# Patient Record
Sex: FEMALE | Race: ASIAN | Employment: STUDENT | URBAN - METROPOLITAN AREA
[De-identification: names, ages, dates, MRNs, and addresses within clinical notes are randomized per-mention and may not be internally consistent; named-entity substitution may affect disease eponyms.]

---

## 2017-01-18 ENCOUNTER — TELEPHONE (OUTPATIENT)
Dept: OBGYN | Facility: CLINIC | Age: 22
End: 2017-01-18

## 2017-01-18 NOTE — TELEPHONE ENCOUNTER
TC to check in about surgery for Friday. Number listed at M had no VM. Number listed as H: LM.     If calls back today please text my cell phone [phone]  I let them know unavail tomorrow (thursday the 19) but we are all set for Friday the 20.    Rosy Ryan

## 2017-01-20 ENCOUNTER — ANESTHESIA EVENT (OUTPATIENT)
Dept: SURGERY | Facility: CLINIC | Age: 22
End: 2017-01-20
Payer: COMMERCIAL

## 2017-01-20 ENCOUNTER — ANESTHESIA (OUTPATIENT)
Dept: SURGERY | Facility: CLINIC | Age: 22
End: 2017-01-20
Payer: COMMERCIAL

## 2017-01-20 ENCOUNTER — OFFICE VISIT (OUTPATIENT)
Dept: INTERPRETER SERVICES | Facility: CLINIC | Age: 22
End: 2017-01-20

## 2017-01-20 ENCOUNTER — SURGERY (OUTPATIENT)
Age: 22
End: 2017-01-20

## 2017-01-20 PROBLEM — Z90.710 S/P HYSTERECTOMY: Status: ACTIVE | Noted: 2017-01-20

## 2017-01-20 PROCEDURE — 25000125 ZZHC RX 250: Performed by: ANESTHESIOLOGY

## 2017-01-20 PROCEDURE — 25000128 H RX IP 250 OP 636: Performed by: ANESTHESIOLOGY

## 2017-01-20 PROCEDURE — 25800025 ZZH RX 258: Performed by: NURSE ANESTHETIST, CERTIFIED REGISTERED

## 2017-01-20 PROCEDURE — 25000128 H RX IP 250 OP 636: Performed by: NURSE ANESTHETIST, CERTIFIED REGISTERED

## 2017-01-20 PROCEDURE — 25000125 ZZHC RX 250: Performed by: OBSTETRICS & GYNECOLOGY

## 2017-01-20 PROCEDURE — C9290 INJ, BUPIVACAINE LIPOSOME: HCPCS | Performed by: ANESTHESIOLOGY

## 2017-01-20 PROCEDURE — 76942 ECHO GUIDE FOR BIOPSY: CPT | Performed by: ANESTHESIOLOGY

## 2017-01-20 PROCEDURE — 25000125 ZZHC RX 250: Performed by: NURSE ANESTHETIST, CERTIFIED REGISTERED

## 2017-01-20 RX ORDER — GLYCOPYRROLATE 0.2 MG/ML
INJECTION, SOLUTION INTRAMUSCULAR; INTRAVENOUS PRN
Status: DISCONTINUED | OUTPATIENT
Start: 2017-01-20 | End: 2017-01-20

## 2017-01-20 RX ORDER — SODIUM CHLORIDE, SODIUM LACTATE, POTASSIUM CHLORIDE, CALCIUM CHLORIDE 600; 310; 30; 20 MG/100ML; MG/100ML; MG/100ML; MG/100ML
INJECTION, SOLUTION INTRAVENOUS CONTINUOUS PRN
Status: DISCONTINUED | OUTPATIENT
Start: 2017-01-20 | End: 2017-01-20

## 2017-01-20 RX ORDER — ONDANSETRON 2 MG/ML
INJECTION INTRAMUSCULAR; INTRAVENOUS PRN
Status: DISCONTINUED | OUTPATIENT
Start: 2017-01-20 | End: 2017-01-20

## 2017-01-20 RX ORDER — DEXAMETHASONE SODIUM PHOSPHATE 4 MG/ML
INJECTION, SOLUTION INTRA-ARTICULAR; INTRALESIONAL; INTRAMUSCULAR; INTRAVENOUS; SOFT TISSUE PRN
Status: DISCONTINUED | OUTPATIENT
Start: 2017-01-20 | End: 2017-01-20

## 2017-01-20 RX ORDER — PROPOFOL 10 MG/ML
INJECTION, EMULSION INTRAVENOUS CONTINUOUS PRN
Status: DISCONTINUED | OUTPATIENT
Start: 2017-01-20 | End: 2017-01-20

## 2017-01-20 RX ORDER — NEOSTIGMINE METHYLSULFATE 1 MG/ML
VIAL (ML) INJECTION PRN
Status: DISCONTINUED | OUTPATIENT
Start: 2017-01-20 | End: 2017-01-20

## 2017-01-20 RX ORDER — BUPIVACAINE HYDROCHLORIDE AND EPINEPHRINE 2.5; 5 MG/ML; UG/ML
INJECTION, SOLUTION INFILTRATION; PERINEURAL PRN
Status: DISCONTINUED | OUTPATIENT
Start: 2017-01-20 | End: 2017-01-20

## 2017-01-20 RX ORDER — FENTANYL CITRATE 50 UG/ML
INJECTION, SOLUTION INTRAMUSCULAR; INTRAVENOUS PRN
Status: DISCONTINUED | OUTPATIENT
Start: 2017-01-20 | End: 2017-01-20

## 2017-01-20 RX ADMIN — Medication 5 ML: at 12:30

## 2017-01-20 RX ADMIN — ROCURONIUM BROMIDE 40 MG: 10 INJECTION INTRAVENOUS at 09:57

## 2017-01-20 RX ADMIN — FENTANYL CITRATE 100 MCG: 50 INJECTION, SOLUTION INTRAMUSCULAR; INTRAVENOUS at 09:57

## 2017-01-20 RX ADMIN — GLYCOPYRROLATE 0.4 MG: 0.2 INJECTION, SOLUTION INTRAMUSCULAR; INTRAVENOUS at 12:30

## 2017-01-20 RX ADMIN — DEXAMETHASONE SODIUM PHOSPHATE 4 MG: 4 INJECTION, SOLUTION INTRAMUSCULAR; INTRAVENOUS at 12:12

## 2017-01-20 RX ADMIN — SODIUM CHLORIDE, POTASSIUM CHLORIDE, SODIUM LACTATE AND CALCIUM CHLORIDE: 600; 310; 30; 20 INJECTION, SOLUTION INTRAVENOUS at 08:30

## 2017-01-20 RX ADMIN — FENTANYL CITRATE 50 MCG: 50 INJECTION, SOLUTION INTRAMUSCULAR; INTRAVENOUS at 11:47

## 2017-01-20 RX ADMIN — SODIUM CHLORIDE, POTASSIUM CHLORIDE, SODIUM LACTATE AND CALCIUM CHLORIDE: 600; 310; 30; 20 INJECTION, SOLUTION INTRAVENOUS at 11:40

## 2017-01-20 RX ADMIN — PROPOFOL 150 MCG/KG/MIN: 10 INJECTION, EMULSION INTRAVENOUS at 10:28

## 2017-01-20 RX ADMIN — FENTANYL CITRATE 50 MCG: 50 INJECTION, SOLUTION INTRAMUSCULAR; INTRAVENOUS at 10:43

## 2017-01-20 RX ADMIN — ROCURONIUM BROMIDE 10 MG: 10 INJECTION INTRAVENOUS at 10:41

## 2017-01-20 RX ADMIN — BUPIVACAINE 20 ML: 13.3 INJECTION, SUSPENSION, LIPOSOMAL INFILTRATION at 09:53

## 2017-01-20 RX ADMIN — NEOSTIGMINE METHYLSULFATE 2 MG: 1 INJECTION INTRAMUSCULAR; INTRAVENOUS; SUBCUTANEOUS at 12:30

## 2017-01-20 RX ADMIN — FENTANYL CITRATE 50 MCG: 50 INJECTION, SOLUTION INTRAMUSCULAR; INTRAVENOUS at 10:36

## 2017-01-20 RX ADMIN — BUPIVACAINE HYDROCHLORIDE AND EPINEPHRINE BITARTRATE 20 ML: 2.5; .005 INJECTION, SOLUTION INFILTRATION; PERINEURAL at 09:53

## 2017-01-20 RX ADMIN — CEFAZOLIN SODIUM 2 G: 2 INJECTION, SOLUTION INTRAVENOUS at 10:20

## 2017-01-20 RX ADMIN — ONDANSETRON 4 MG: 2 INJECTION INTRAMUSCULAR; INTRAVENOUS at 10:25

## 2017-01-20 RX ADMIN — ONDANSETRON 4 MG: 2 INJECTION INTRAMUSCULAR; INTRAVENOUS at 12:12

## 2017-01-20 NOTE — ADDENDUM NOTE
Addendum  created 01/20/17 1537 by Anne Marie Voss APRN CRNA    Modules edited: Anesthesia Events

## 2017-01-20 NOTE — ANESTHESIA PREPROCEDURE EVALUATION
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)        Past Surgical History   Procedure Laterality Date     Picc insertion       removed 2/25/09     C anesth,eye exam       Retinopathy of prematurity     Implant sophono device  4/27/2012     Procedure:IMPLANT SOPHONO DEVICE; Left Sophono Implant   ; Surgeon:RANDY WARNER; [facility]     Exam under anesthesia eye(s)  4/24/2014     Procedure: EXAM UNDER ANESTHESIA EYE(S);  Surgeon: Romi Mary MD;  [facility]     Exam under anesthesia, restorations, extraction(s) dental complex, combined  3/29/2012     Procedure:COMBINED EXAM UNDER ANESTHESIA, RESTORATIONS, EXTRACTION(S) DENTAL COMPLEX; Dental Exam, Radiograph, Dental Restorations, Periodontal Therapy, right ear mold impression.; Surgeon:PRITI VILLEGAS; [facility]     Exam under anesthesia, restorations, extraction(s) dental, combined  4/24/2014     Procedure: COMBINED EXAM UNDER ANESTHESIA, RESTORATIONS, EXTRACTION(S) DENTAL;  Surgeon: Yun Flores DDS;  [facility]     Exam under anesthesia, restorations, extraction(s) dental, combined N/A 9/2/2015     Procedure: COMBINED EXAM UNDER ANESTHESIA, RESTORATIONS, EXTRACTION(S) DENTAL;  Surgeon: Priti Villegas DDS;  [facility]     Pe tubes       Exam under anesthesia ear(s)  4/27/2012     Procedure:EXAM UNDER ANESTHESIA EAR(S); Surgeon:RANDY WARNER; [facility]      Exam under anesthesia ear(s)  4/24/2014     Procedure: EXAM UNDER ANESTHESIA EAR(S);  Surgeon: Omar Quiroz MD;  [facility]     Exam under anesthesia nose  4/24/2014     Procedure: EXAM UNDER ANESTHESIA NOSE;  Surgeon: Omar Quiroz MD;  [facility]     Adenoidectomy  4/24/2014     Procedure: ADENOIDECTOMY;  Surgeon: Omar Quiroz MD;  [facility]     Exam under anesthesia ear(s) Bilateral 9/2/2015     Procedure: EXAM UNDER ANESTHESIA EAR(S);  Surgeon: Speedy Griffin MD;  [facility]           Allergies:   Allergies   Allergen Reactions     Benzalkonium Chloride Rash       Meds:   Prescriptions prior to admission   Medication Sig Dispense Refill Last Dose     medroxyPROGESTERone (DEPO-PROVERA) 150 MG/ML vial Inject 1 mL (150 mg) into the muscle every 3 months 1 mL 0 Taking     order for DME Equipment being ordered: orthotics 1 Device 0 Taking     Fiber, Guar Gum, CHEW Take 2.5 g by mouth 2 times daily   Taking     FEXOFENADINE HCL PO Take 180 mg by mouth   Taking     MULTI-VITAMIN OR TABS 1 DAILY   Taking     FIBER THERAPY 2 GM/10.2GM OR POWD 2 GRAMS TWICE DAILY   Taking       No current outpatient prescriptions on file.       Physical Exam:  VS: T 97.2, P Data Unavailable, /72, R 14, SpO2 100%.  Weight 47 Kg      BMP:  Recent Labs   Lab Test 11/26/08   GLC  Negative     LFTs:   No results for input(s): PROTTOTAL, ALBUMIN, BILITOTAL, ALKPHOS, AST, ALT, BILIDIRECT in the last 29079 hours.  CBC:   Recent Labs   Lab Test  02/03/10   1020  01/26/09   1315   WBC   --   10.3   RBC   --   4.98   HGB  11.6*  9.8*   HCT   --   32.0*   MCV   --   64*   MCH   --   19.7*   MCHC   --   30.6*   RDW   --   17.5*   PLT   --   337     Coags:  No results for input(s): INR, PTT, FIBR in the last 61063 hours.        Brenda Cates MD      1/20/2017  8:38 AM          Anesthesia Evaluation     . Pt has had prior anesthetic. Type: General    History of anesthetic complications  - PONV    ROS/MED  HX    ENT/Pulmonary:  - neg pulmonary ROS     Neurologic:     (+)other neuro    Spinal cord injury: Severe develomental delay, blindeness.   Cardiovascular:  - neg cardiovascular ROS       METS/Exercise Tolerance:  3 - Able to walk 1-2 blocks without stopping   Hematologic:  - neg hematologic  ROS       Musculoskeletal:  - neg musculoskeletal ROS       GI/Hepatic:  - neg GI/hepatic ROS       Renal/Genitourinary:  - ROS Renal section negative       Endo:  - neg endo ROS       Psychiatric:  - neg psychiatric ROS       Infectious Disease:  - neg infectious disease ROS       Malignancy:      - no malignancy   Other: Comment: Full assistance for ADL   (+) No chance of pregnancy other significant disability Blind, Deaf, Developmental delay and Other (comment)             Physical Exam  Normal systems: cardiovascular and pulmonary    Airway   Mallampati: III  TM distance: >3 FB  Neck ROM: full  Comment: Patient does not cooperate in full for airway exam      Dental   Comment: Several molars missing    Cardiovascular   Rhythm and rate: regular and normal      Pulmonary    breath sounds clear to auscultation                    Anesthesia Plan      History & Physical Review  History and physical reviewed and following examination; no interval change.    ASA Status:  2 .    NPO Status:  > 6 hours    Plan for MAC with Intravenous and Propofol induction. Reason for MAC:  Extreme anxiety (QS)  PONV prophylaxis:  Ondansetron (or other 5HT-3)  Patient with significant developmental delay, non verbal/does not communicate. Patient is unable to cooperate. Reviewed GA R/B with Mother.  All questions answered.  Mom wishes to proceed.  This is MAC for barrett placement. Bladder scan 900 ml urine.      Postoperative Care  Postoperative pain management:  IV analgesics.      Consents  Anesthetic plan, risks, benefits and alternatives discussed with:  Legal guardian and Parent (Mother and/or Father)..                          .

## 2017-01-20 NOTE — ANESTHESIA POSTPROCEDURE EVALUATION
Patient: Pavithra Wing    LAPAROSCOPIC HYSTERECTOMY TOTAL, SALPINGECTOMY BILATERAL (Bilateral Abdomen)  Additional InformationProcedure(s):  Total Laparoscopic Hysterectomy with Bilateral Salpingectomy (remove uterus and tubes) - Wound Class: II-Clean Contaminated    Diagnosis:Menorrhagia  Diagnosis Additional Information: No value filed.    Anesthesia Type:  General, ETT    Note:  Anesthesia Post Evaluation    Patient location during evaluation: PACU and Bedside  Patient participation: Able to fully participate in evaluation  Level of consciousness: awake and alert  Pain management: adequate  Airway patency: patent  Cardiovascular status: acceptable  Respiratory status: acceptable  Hydration status: acceptable  PONV: none     Anesthetic complications: None          Last vitals:  Filed Vitals:    01/20/17 1445 01/20/17 1500 01/20/17 1515   BP: 103/52 97/55 119/64   Temp:      Resp: 20 19 13   SpO2: 99% 99% 99%       Electronically Signed By: Brenda Cates MD  January 20, 2017  3:19 PM

## 2017-01-20 NOTE — ANESTHESIA CARE TRANSFER NOTE
Patient: Pavithra Wing    LAPAROSCOPIC HYSTERECTOMY TOTAL, SALPINGECTOMY BILATERAL (Bilateral Abdomen)  Additional InformationProcedure(s):  Total Laparoscopic Hysterectomy with Bilateral Salpingectomy (remove uterus and tubes) - Wound Class: II-Clean Contaminated    Diagnosis: Menorrhagia  Diagnosis Additional Information: No value filed.    Anesthesia Type:   General, ETT     Note:  Airway :Face Mask  Patient transferred to:PACU  Comments: Report to MARIA INES Villanueva  Pt calm, sleeping,  RR 12 and clear, SaO2 100% O2 FM,  113/95  78  Temp 36.2 C Ax    Dr Bo present in OR op end, will follow Platelets      Vitals: (Last set prior to Anesthesia Care Transfer)              Electronically Signed By: SEB Smith CRNA  January 20, 2017  1:06 PM

## 2017-01-20 NOTE — ANESTHESIA PROCEDURE NOTES
Peripheral Nerve Block Procedure Note    Staff:     Anesthesiologist:  PAOLA GENAO    Resident/CRNA:  TYSHAWN DHALIWAL    Block performed by resident/CRNA in the presence of a teaching physician    Location: OR AFTER induction  Procedure Start/Stop TImes:      1/20/2017 9:45 AM     1/20/2017 9:55 AM    patient identified, IV checked, site marked, risks and benefits discussed, informed consent, monitors and equipment checked, pre-op evaluation, at physician/surgeon's request and post-op pain management      Correct Patient: Yes      Correct Position: Yes      Correct Site: Yes      Correct Procedure: Yes      Correct Laterality:  Yes    Site Marked:  Yes  Procedure details:     Procedure:  TAP    Laterality:  Bilateral    Position:  Supine    Sterile Prep: chloraprep, mask and sterile gloves      Needle:  Other    Needle gauge:  22    Needle length (inches):  4    Ultrasound: Yes      Ultrasound used to identify targeted nerve, plexus, or vascular structure and placed a needle adjacent to it      Permanent Image entered into patiient's record      Blood Aspirated: No      Bleeding at site: No      Complications:  None  Assessment/Narrative:      Single shot bilateral transversus abdominis plane blocks with 20 ml Exparel (10 ml each side) and 20 mL bupivacaine 0.25% with epinephrine 1:200,000 (10 mL each side).

## 2017-01-21 PROCEDURE — 25000125 ZZHC RX 250: Performed by: ANESTHESIOLOGY

## 2017-01-21 RX ORDER — PROPOFOL 10 MG/ML
INJECTION, EMULSION INTRAVENOUS PRN
Status: DISCONTINUED | OUTPATIENT
Start: 2017-01-21 | End: 2017-01-21

## 2017-01-21 RX ADMIN — PROPOFOL 25 MG: 10 INJECTION, EMULSION INTRAVENOUS at 00:31

## 2017-01-21 RX ADMIN — PROPOFOL 50 MG: 10 INJECTION, EMULSION INTRAVENOUS at 00:30

## 2017-01-21 RX ADMIN — PROPOFOL 100 MG: 10 INJECTION, EMULSION INTRAVENOUS at 00:25

## 2017-01-21 RX ADMIN — PROPOFOL 50 MG: 10 INJECTION, EMULSION INTRAVENOUS at 00:20

## 2017-01-21 NOTE — ADDENDUM NOTE
Addendum  created 01/21/17 0313 by Merry Nesbitt MD    Modules edited: Anesthesia Attestations, SmartForms    SmartForms:  Anesthesia Intraprocedure SmartForm: [id number]

## 2017-01-21 NOTE — ADDENDUM NOTE
Addendum  created 01/21/17 0045 by Cayla Templeton MD    Modules edited: Anesthesia Events, Anesthesia Medication Administration, Anesthesia Responsible Staff, Narrator, Orders    Narrator:  Narrator: Event Log Edited

## 2017-01-21 NOTE — ADDENDUM NOTE
Addendum  created 01/21/17 0010 by Merry Nesbitt MD    Modules edited: Clinical Notes    Clinical Notes:  File: [id number]

## 2017-01-24 ENCOUNTER — ALLIED HEALTH/NURSE VISIT (OUTPATIENT)
Dept: OBGYN | Facility: CLINIC | Age: 22
End: 2017-01-24
Payer: COMMERCIAL

## 2017-01-24 ENCOUNTER — TELEPHONE (OUTPATIENT)
Dept: OBGYN | Facility: CLINIC | Age: 22
End: 2017-01-24

## 2017-01-24 DIAGNOSIS — Z98.1 ARTHRODESIS STATUS: Primary | ICD-10-CM

## 2017-01-24 NOTE — NURSING NOTE
Patient here accompanied by her mother for barrett catheter removal. The barrett as been clamped for approximately two hours. Bladder scan completed and this showed approximately 100 ml in bladder. Two large glasses of water consumed. Repeat bladder scan showed 200 ml. Catheter removed after water removed from balloon. Patient ambulated to bathroom and large amount of clear yellow urine voided without difficulty. Mother instructed to make sure patient wipes front to back and to increase fluid intake the next few days. She verbalized understanding

## 2017-01-24 NOTE — TELEPHONE ENCOUNTER
Spoke with patients mother Jody and provided them with barrett catheter removal instructions. She states that Pavithra seems anxious to have it removed and has had some blood in her urine. She understood the plan and had no further questions.

## 2017-02-05 ENCOUNTER — TELEPHONE (OUTPATIENT)
Dept: OBGYN | Facility: CLINIC | Age: 22
End: 2017-02-05

## 2017-02-05 NOTE — TELEPHONE ENCOUNTER
Telephone call from pt's mother-pt is 12 days s/p TLH/BS for AUB in the setting of profound developmental delay who started having increased vaginal bleeding over the last few days. Yesterday soaked through a thin pad, this morning having some bleeding-very small amount, bright red.  She is otherwise doing well.  Was back to her normal self by POD #3 according to her mother.  She has been eating normally, normal bowel function, no pain or fever.  The op note was reviewed-the vaginal cuff was closed from the vagina, so likely low risk of vaginal cuff dehiscence.  Since the pt is otherwise doing well this morning, plan was made to watch her symptoms.  If she has increased bleeding again today, any fever or if she shows signs of discomfort then her mother will bring her to the ED and we would arrange for an EUA under sedation.      Margarita Osullivan MD, FACOG

## 2017-02-27 ENCOUNTER — TELEPHONE (OUTPATIENT)
Dept: OPHTHALMOLOGY | Facility: CLINIC | Age: 22
End: 2017-02-27

## 2017-02-27 NOTE — TELEPHONE ENCOUNTER
[facility] working [facility]  Last record [facility] was from 2008  Reviewed note-- pt was about 12 years old at exam  ? Light perception vision at visit-- only note scanned  H/o retinopathy of prematurity remarked on   Recommended exam under anesthesia at last visit    [facility] [place] has appt coming up with eye doctor, but note from upcoming appt showing no information for services  Provided OVIDIO phone number for further assistance  Andrea Castanon RN 4:33 PM 02/27/17

## 2017-03-08 ENCOUNTER — OFFICE VISIT (OUTPATIENT)
Dept: OBGYN | Facility: CLINIC | Age: 22
End: 2017-03-08
Attending: OBSTETRICS & GYNECOLOGY
Payer: COMMERCIAL

## 2017-03-08 ENCOUNTER — APPOINTMENT (OUTPATIENT)
Dept: LAB | Facility: CLINIC | Age: 22
End: 2017-03-08
Attending: OBSTETRICS & GYNECOLOGY
Payer: COMMERCIAL

## 2017-03-08 VITALS — BODY MASS INDEX: 20.44 KG/M2 | WEIGHT: 104.1 LBS | HEIGHT: 60 IN

## 2017-03-08 DIAGNOSIS — F41.9 ANXIETY: Primary | ICD-10-CM

## 2017-03-08 LAB
ERYTHROCYTE [DISTWIDTH] IN BLOOD BY AUTOMATED COUNT: 13.9 % (ref 10–15)
HCT VFR BLD AUTO: 41.7 % (ref 35–47)
HGB BLD-MCNC: 13.4 G/DL (ref 11.7–15.7)
MCH RBC QN AUTO: 25.4 PG (ref 26.5–33)
MCHC RBC AUTO-ENTMCNC: 32.1 G/DL (ref 31.5–36.5)
MCV RBC AUTO: 79 FL (ref 78–100)
PLATELET # BLD AUTO: 1744 10E9/L (ref 150–450)
RBC # BLD AUTO: 5.28 10E12/L (ref 3.8–5.2)
WBC # BLD AUTO: 12.7 10E9/L (ref 4–11)

## 2017-03-08 PROCEDURE — 99212 OFFICE O/P EST SF 10 MIN: CPT | Mod: ZF

## 2017-03-08 PROCEDURE — 36415 COLL VENOUS BLD VENIPUNCTURE: CPT | Performed by: OBSTETRICS & GYNECOLOGY

## 2017-03-08 PROCEDURE — 85027 COMPLETE CBC AUTOMATED: CPT | Performed by: OBSTETRICS & GYNECOLOGY

## 2017-03-08 RX ORDER — ALPRAZOLAM 0.5 MG
TABLET ORAL
Qty: 1 TABLET | Refills: 0 | Status: SHIPPED | OUTPATIENT
Start: 2017-03-08 | End: 2017-03-29

## 2017-03-08 NOTE — MR AVS SNAPSHOT
"              After Visit Summary   3/8/2017    Pavithra Wing    MRN: [id number]           Patient Information     Date Of Birth          1995        Visit Information        Provider Department      3/8/2017 10:30 AM Donna Marcos; Rosy Ryan MD [facility]        Today's Diagnoses     Anxiety    -  1       Follow-ups after your visit        Who to contact     Please call your clinic at [phone] to:    Ask questions about your health    Make or cancel appointments    Discuss your medicines    Learn about your test results    Speak to your doctor   If you have compliments or concerns about an experience at your clinic, or if you wish to file a complaint, please contact [facility] Patient Relations at [phone] or email us at [email or web address]         Additional Information About Your Visit        MyChart Information     Zoe Majeste is an electronic gateway that provides easy, online access to your medical records. With Zoe Majeste, you can request a clinic appointment, read your test results, renew a prescription or communicate with your care team.     To sign up for [employer] visit the website at [email or web address]   You will be asked to enter the access code listed below, as well as some personal information. Please follow the directions to create your username and password.     Your access code is: [email or web address]  Expires: 2017  9:00 AM     Your access code will  in 90 days. If you need help or a new code, please contact your [facility] or call [phone] for assistance.        Care EveryWhere ID     This is your Care EveryWhere ID. This could be used by other organizations to access your [place] medical records  [id number]        Your Vitals Were     Height Last Period BMI (Body Mass Index)             1.511 m (4' 11.5\") 2017 20.67 kg/m2          Blood Pressure from " Last 3 Encounters:   01/21/17 123/82   12/26/16 139/84   08/15/16 112/70    Weight from Last 3 Encounters:   03/08/17 47.2 kg (104 lb 1.6 oz)   01/20/17 47.1 kg (103 lb 13.4 oz)   12/26/16 44.9 kg (99 lb)              We Performed the Following     CBC with Platelets          Today's Medication Changes          These changes are accurate as of: 3/8/17 11:59 PM.  If you have any questions, ask your nurse or doctor.               Start taking these medicines.        Dose/Directions    ALPRAZolam 0.5 MG tablet   Commonly known as:  XANAX   Used for:  Anxiety   Started by:  Rosy Ryan MD        One po stat   Quantity:  1 tablet   Refills:  0         Stop taking these medicines if you haven't already. Please contact your care team if you have questions.     oxyCODONE 5 MG IR tablet   Commonly known as:  ROXICODONE   Stopped by:  Rosy Ryan MD                Where to get your medicines      Some of these will need a paper prescription and others can be bought over the counter.  Ask your nurse if you have questions.     Bring a paper prescription for each of these medications     ALPRAZolam 0.5 MG tablet                Primary Care Provider Office Phone # Fax #    Ngozi SEB Yepez [facility] [phone]       [facility] [address] [facility] [address]        Thank you!     Thank you for choosing [facility]  for your care. Our goal is always to provide you with excellent care. Hearing back from our patients is one way we can continue to improve our services. Please take a few minutes to complete the written survey that you may receive in the mail after your visit with us. Thank you!             Your Updated Medication List - Protect others around you: Learn how to safely use, store and throw away your medicines at www.disposemymeds.org.          This list is accurate as of: 3/8/17 11:59 PM.  Always use your most recent med list.                    Brand Name Dispense Instructions for use    acetaminophen 325 MG tablet    TYLENOL    100 tablet    Take 2 tablets (650 mg) by mouth every 4 hours as needed for mild pain       ALPRAZolam 0.5 MG tablet    XANAX    1 tablet    One po stat       FEXOFENADINE HCL PO      Take 180 mg by mouth       Fiber (Guar Gum) Chew      Take 2.5 g by mouth 2 times daily       FIBER THERAPY Powd   Generic drug:  methylcellulose (laxative)      2 GRAMS TWICE DAILY       Multi-vitamin Tabs tablet   Generic drug:  multivitamin, therapeutic with minerals      1 DAILY       order for DME     1 Device    Equipment being ordered: orthotics       senna-docusate 8.6-50 MG per tablet    SENOKOT-S;PERICOLACE    60 tablet    Take 1 tablet by mouth 2 times daily as needed for constipation

## 2017-03-08 NOTE — PROGRESS NOTES
S: Pavithra Wing is a 21 year old with severe developmental delay here for post-operative visit following TLH + bl salpingectomy on 1/27 for persistent dysmenorrhea, AUB refractory to medical management, and guardians' desired definitive therapy for patient's well-being. Surgery was uncomplicated but thrombocytosis was noted.      Dad states Pavithra had some bleeding the first two weeks after the procedure, but she has had none since. Per dad she is acting her normal self, with normal bowel and bladder function. He has no concerns about her recovery, but is wondering about the lab test she needed to follow up on - after discussion he remembered it was to follow up on her elevated platelets. He wonders if we can give her a medication to calm her down so she is more relaxed for the blood draw.    Gyn Hx:   Patient's last menstrual period was 01/20/2017.    Current Outpatient Prescriptions   Medication Sig Dispense Refill     ALPRAZolam (XANAX) 0.5 MG tablet One po stat 1 tablet 0     acetaminophen (TYLENOL) 325 MG tablet Take 2 tablets (650 mg) by mouth every 4 hours as needed for mild pain 100 tablet 0     senna-docusate (SENOKOT-S;PERICOLACE) 8.6-50 MG per tablet Take 1 tablet by mouth 2 times daily as needed for constipation 60 tablet 0     order for DME Equipment being ordered: orthotics 1 Device 0     Fiber, Guar Gum, CHEW Take 2.5 g by mouth 2 times daily       FEXOFENADINE HCL PO Take 180 mg by mouth       MULTI-VITAMIN OR TABS 1 DAILY       FIBER THERAPY 2 GM/10.2GM OR POWD 2 GRAMS TWICE DAILY       Allergies   Allergen Reactions     Benzalkonium Chloride Rash     Past Medical History   Diagnosis Date     Atopic dermatitis      Blindness      Constipation      Developmental delay      SEVERE     Dysgerminoma brain tumor, 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)      Hearing impaired      Heavy menstrual period      Hyperlipidaemia LDL goal < 160 8/12/2014     Irregular menstrual bleeding      Lactose intolerance 3/14/2011      Mastoiditis      (r)     PONV (postoperative nausea and vomiting)      Past Surgical History   Procedure Laterality Date     Picc insertion       removed 2/25/09     C anesth,eye exam       Retinopathy of prematurity     Implant sophono device  4/27/2012     Procedure:IMPLANT SOPHONO DEVICE; Left Sophono Implant   ; Surgeon:OMAR QUIROZ; [facility]     Exam under anesthesia eye(s)  4/24/2014     Procedure: EXAM UNDER ANESTHESIA EYE(S);  Surgeon: Romi Mary MD;  [facility]     Exam under anesthesia, restorations, extraction(s) dental complex, combined  3/29/2012     Procedure:COMBINED EXAM UNDER ANESTHESIA, RESTORATIONS, EXTRACTION(S) DENTAL COMPLEX; Dental Exam, Radiograph, Dental Restorations, Periodontal Therapy, right ear mold impression.; Surgeon:PRITI VILLEGAS; [facility]     Exam under anesthesia, restorations, extraction(s) dental, combined  4/24/2014     Procedure: COMBINED EXAM UNDER ANESTHESIA, RESTORATIONS, EXTRACTION(S) DENTAL;  Surgeon: Yun Flores DDS;  [facility]     Exam under anesthesia, restorations, extraction(s) dental, combined N/A 9/2/2015     Procedure: COMBINED EXAM UNDER ANESTHESIA, RESTORATIONS, EXTRACTION(S) DENTAL;  Surgeon: Priti Villegas DDS;  [facility]     Pe tubes       Exam under anesthesia ear(s)  4/27/2012     Procedure:EXAM UNDER ANESTHESIA EAR(S); Surgeon:OMAR QUIROZ; [facility]     Exam under anesthesia ear(s)  4/24/2014     Procedure: EXAM UNDER ANESTHESIA EAR(S);  Surgeon: Omar Quiroz MD;  [facility]     Exam under anesthesia nose  4/24/2014     Procedure: EXAM UNDER ANESTHESIA NOSE;  Surgeon: Omar Quiroz MD;  [facility]     Adenoidectomy  4/24/2014     Procedure: ADENOIDECTOMY;  Surgeon: Omar Quiroz MD;  [facility]     Exam under anesthesia ear(s) Bilateral 9/2/2015     Procedure: EXAM UNDER ANESTHESIA EAR(S);  Surgeon: Speedy Griffin MD;  [facility]     Laparoscopic  "hysterectomy total, salpingectomy bilateral Bilateral 1/20/2017     Procedure: LAPAROSCOPIC HYSTERECTOMY TOTAL, SALPINGECTOMY BILATERAL;  Surgeon: Rosy Castaneda MD;  [facility]       O:    1.511 m (4' 11.5\")  Wt 47.2 kg (104 lb 1.6 oz)  LMP 01/20/2017  BMI 20.67 kg/m2  General: Well-appearing female, no apparent distress, sitting in chair.    Labs:   Hemoglobin   Date Value Ref Range Status   01/21/2017 10.3 (L) 11.7 - 15.7 g/dL Final     Pathology:   Copath Report   Date Value Ref Range Status   01/20/2017   Final    Patient Name: DAYTON MYERS  MR#: [id number]  Specimen #:   Collected: 1/20/2017  Received: 1/20/2017  Reported: 1/24/2017 23:52  Ordering Phy(s): ROSY CASTANEDA    For improved result formatting, select 'View Enhanced Report Format'  under Linked Documents section.    SPECIMEN(S):  Uterus, cervix and bilateral fallopian tubes    FINAL DIAGNOSIS:  UTERUS, CERVIX AND BILATERAL FALLOPIAN TUBES, TOTAL LAPAROSCOPIC  HYSTERECTOMY AND BILATERAL SALPINGECTOMY:  - Inactive endometrium with pseudodecidualized stroma, consistent with  exogenous progestin effect  - Myometrium with no significant histologic abnormality  - Cervix with no significant histologic abnormality  - Bilateral fallopian tubes with no significant histologic abnormality    I have personally reviewed all specimens and or slides, including the  listed special stains, and used them with my medical judgement to  determine the final diagnosis.    Electronically signed out by:    Tessa Birmingham M.D, [facility]    CLINICAL HISTORY:  The patient is a 21 year old woman with dysmenorrhea, abnormal uterine  bleeding, anemia, and severe developmental delay.  Procedure: total  laparoscopic hysterectomy and bilateral salpingectomy.    GROSS:  The specimen is received fresh with proper patient identification  labeled \"uterus, cervix, and bilateral fallopian tubes\".  It consists of  a 36.7 g uterus with attached " cervix (8.3 cm fundus to ectocervix x 3.4  cm cornu to cornu x 2.5 cm anterior to posterior) and left fimbriated  fallopian tube (7.5 cm in length x 0.4 cm in diameter), and detached  right fimbriated fallopian tube (4.7 cm in length x 0.4 cm in diameter).  The uterine serosa is uniformly pink-tan and smooth.  The ectocervix is  slightly ragged at the left posterior aspect. The ectocervical mucosa is  uniformly pink and smooth, with no focal lesion.  The endometrium (0.2  cm) is uniformly tan-pink and glistening, with no polyps or masses.  The  myometrium (1.0 cm) is uniformly tan-pink and smooth.  No myometrial  lesions are identified.  The serosa of the bilateral fallopian tubes is pink-purple and smooth  with no adhesions or areas of granularity.  The bilateral fimbriae are  unremarkable.  Representative sections are submitted:    Summary of Sections:  1 - anterior cervix (x2)  2 - posterior cervix (x2)  3-4 - anterior endomyometrium  5-6 - posterior endomyometrium  7 - left fallopian tube (fimbriae entirely submitted)  8 - right fallopian tube (fimbriae entirely submitted)    MICROSCOPIC:  Microscopic examination is performed.    CPT Codes:  A: 34744-JS0    TESTING LAB LOCATION:  [address] [address] [phone]    COLLECTION SITE:  Client: [facility]  Location: [facility] ([facility])         A: Post op TLH + bl salpingectomy on 1/27.  Doing well, no concerns.    P:   - Reviewed pathology and hemoglobin  - Repeat CBC to assess plt count per hematology - if persistent thrombocytosis, outpatient follow-up with Dr. Myrick was recommended.  - One-time 0.5mg alprazolam ordered for lab draw this afternoon.  - Will communicate results and follow-up information to father via email ([email or web address]).  - Follow-up PRN for any questions or concerns.    Abbie WHITLOCK, MS4, served as scribe for Dr. Gallegos  Bhavna Ryan MD during this encounter.    The student acted as my scribe. I have seen, examined and counseled the patient. I have reviewed and edited the note.   Rosy Ryan

## 2017-03-08 NOTE — LETTER
3/8/2017       RE: Pavithra Wing  [address] [place] [facility] [place] [facility] [address]     Dear Colleague,    Thank you for referring your patient, Pavithra Wing, to the [facility] at [facility]. Please see a copy of my visit note below.    S: Pavithra Wing is a 21 year old with severe developmental delay here for post-operative visit following TLH + bl salpingectomy on 1/27 for persistent dysmenorrhea, AUB refractory to medical management, and guardians' desired definitive therapy for patient's well-being. Surgery was uncomplicated but thrombocytosis was noted.      Dad states Pavithra had some bleeding the first two weeks after the procedure, but she has had none since. Per dad she is acting her normal self, with normal bowel and bladder function. He has no concerns about her recovery, but is wondering about the lab test she needed to follow up on - after discussion he remembered it was to follow up on her elevated platelets. He wonders if we can give her a medication to calm her down so she is more relaxed for the blood draw.    Gyn Hx:   Patient's last menstrual period was 01/20/2017.    Current Outpatient Prescriptions   Medication Sig Dispense Refill     ALPRAZolam (XANAX) 0.5 MG tablet One po stat 1 tablet 0     acetaminophen (TYLENOL) 325 MG tablet Take 2 tablets (650 mg) by mouth every 4 hours as needed for mild pain 100 tablet 0     senna-docusate (SENOKOT-S;PERICOLACE) 8.6-50 MG per tablet Take 1 tablet by mouth 2 times daily as needed for constipation 60 tablet 0     order for DME Equipment being ordered: orthotics 1 Device 0     Fiber, Guar Gum, CHEW Take 2.5 g by mouth 2 times daily       FEXOFENADINE HCL PO Take 180 mg by mouth       MULTI-VITAMIN OR TABS 1 DAILY       FIBER THERAPY 2 GM/10.2GM OR POWD 2 GRAMS TWICE DAILY       Allergies   Allergen Reactions     Benzalkonium Chloride Rash     Past Medical History    Diagnosis Date     Atopic dermatitis      Blindness      Constipation      Developmental delay      SEVERE     Dysgerminoma brain tumor, 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)      Hearing impaired      Heavy menstrual period      Hyperlipidaemia LDL goal < 160 8/12/2014     Irregular menstrual bleeding      Lactose intolerance 3/14/2011     Mastoiditis      (r)     PONV (postoperative nausea and vomiting)      Past Surgical History   Procedure Laterality Date     Picc insertion       removed 2/25/09     C anesth,eye exam       Retinopathy of prematurity     Implant sophono device  4/27/2012     Procedure:IMPLANT SOPHONO DEVICE; Left Sophono Implant   ; Surgeon:OMAR QUIROZ; [facility]     Exam under anesthesia eye(s)  4/24/2014     Procedure: EXAM UNDER ANESTHESIA EYE(S);  Surgeon: Romi Mary MD;  [facility]     Exam under anesthesia, restorations, extraction(s) dental complex, combined  3/29/2012     Procedure:COMBINED EXAM UNDER ANESTHESIA, RESTORATIONS, EXTRACTION(S) DENTAL COMPLEX; Dental Exam, Radiograph, Dental Restorations, Periodontal Therapy, right ear mold impression.; Surgeon:PRITI VILLEGAS; [facility]     Exam under anesthesia, restorations, extraction(s) dental, combined  4/24/2014     Procedure: COMBINED EXAM UNDER ANESTHESIA, RESTORATIONS, EXTRACTION(S) DENTAL;  Surgeon: Yun Flores DDS;  [facility]     Exam under anesthesia, restorations, extraction(s) dental, combined N/A 9/2/2015     Procedure: COMBINED EXAM UNDER ANESTHESIA, RESTORATIONS, EXTRACTION(S) DENTAL;  Surgeon: Priti Villegas DDS;  [facility]     Pe tubes       Exam under anesthesia ear(s)  4/27/2012     Procedure:EXAM UNDER ANESTHESIA EAR(S); Surgeon:OMAR QUIROZ; [facility]     Exam under anesthesia ear(s)  4/24/2014     Procedure: EXAM UNDER ANESTHESIA EAR(S);  Surgeon: Omar Quiroz MD;  [facility]     Exam under anesthesia nose  4/24/2014     Procedure: EXAM UNDER ANESTHESIA NOSE;  Surgeon:  "Omar Quiroz MD;  [facility]     Adenoidectomy  4/24/2014     Procedure: ADENOIDECTOMY;  Surgeon: Omar Quiroz MD;  [facility]     Exam under anesthesia ear(s) Bilateral 9/2/2015     Procedure: EXAM UNDER ANESTHESIA EAR(S);  Surgeon: Speedy Griffin MD;  [facility]     Laparoscopic hysterectomy total, salpingectomy bilateral Bilateral 1/20/2017     Procedure: LAPAROSCOPIC HYSTERECTOMY TOTAL, SALPINGECTOMY BILATERAL;  Surgeon: Rosy Ryan MD;  [facility]       O:   Ht 1.511 m (4' 11.5\")  Wt 47.2 kg (104 lb 1.6 oz)  LMP 01/20/2017  BMI 20.67 kg/m2  General: Well-appearing female, no apparent distress, sitting in chair.    Labs:   Hemoglobin   Date Value Ref Range Status   01/21/2017 10.3 (L) 11.7 - 15.7 g/dL Final     Pathology:   Copath Report   Date Value Ref Range Status   01/20/2017   Final    Patient Name: DAYTON MYERS  MR#: [id number]  Specimen #:   Collected: 1/20/2017  Received: 1/20/2017  Reported: 1/24/2017 23:52  Ordering Phy(s): ROSY RYAN    For improved result formatting, select 'View Enhanced Report Format'  under Linked Documents section.    SPECIMEN(S):  Uterus, cervix and bilateral fallopian tubes    FINAL DIAGNOSIS:  UTERUS, CERVIX AND BILATERAL FALLOPIAN TUBES, TOTAL LAPAROSCOPIC  HYSTERECTOMY AND BILATERAL SALPINGECTOMY:  - Inactive endometrium with pseudodecidualized stroma, consistent with  exogenous progestin effect  - Myometrium with no significant histologic abnormality  - Cervix with no significant histologic abnormality  - Bilateral fallopian tubes with no significant histologic abnormality    I have personally reviewed all specimens and or slides, including the  listed special stains, and used them with my medical judgement to  determine the final diagnosis.    Electronically signed out by:    Tessa Birmingham M.D, [facility]    CLINICAL HISTORY:  The patient is a 21 year old woman with dysmenorrhea, abnormal " "uterine  bleeding, anemia, and severe developmental delay.  Procedure: total  laparoscopic hysterectomy and bilateral salpingectomy.    GROSS:  The specimen is received fresh with proper patient identification  labeled \"uterus, cervix, and bilateral fallopian tubes\".  It consists of  a 36.7 g uterus with attached cervix (8.3 cm fundus to ectocervix x 3.4  cm cornu to cornu x 2.5 cm anterior to posterior) and left fimbriated  fallopian tube (7.5 cm in length x 0.4 cm in diameter), and detached  right fimbriated fallopian tube (4.7 cm in length x 0.4 cm in diameter).  The uterine serosa is uniformly pink-tan and smooth.  The ectocervix is  slightly ragged at the left posterior aspect. The ectocervical mucosa is  uniformly pink and smooth, with no focal lesion.  The endometrium (0.2  cm) is uniformly tan-pink and glistening, with no polyps or masses.  The  myometrium (1.0 cm) is uniformly tan-pink and smooth.  No myometrial  lesions are identified.  The serosa of the bilateral fallopian tubes is pink-purple and smooth  with no adhesions or areas of granularity.  The bilateral fimbriae are  unremarkable.  Representative sections are submitted:    Summary of Sections:  1 - anterior cervix (x2)  2 - posterior cervix (x2)  3-4 - anterior endomyometrium  5-6 - posterior endomyometrium  7 - left fallopian tube (fimbriae entirely submitted)  8 - right fallopian tube (fimbriae entirely submitted)    MICROSCOPIC:  Microscopic examination is performed.    CPT Codes:  A: 02061-GC7    TESTING LAB LOCATION:  [address] [address] [phone]    COLLECTION SITE:  Client: [facility]  Location: [facility] (B)         A: Post op TLH + bl salpingectomy on 1/27.  Doing well, no concerns.    P:   - Reviewed pathology and hemoglobin  - Repeat CBC to assess plt count per hematology - if persistent thrombocytosis, " outpatient follow-up with Dr. Myrick was recommended.  - One-time 0.5mg alprazolam ordered for lab draw this afternoon.  - Will communicate results and follow-up information to father via email ([email or web address]).  - Follow-up PRN for any questions or concerns.    I, Abbie Hannonjacob, MS4, served as scribe for Dr. Rosy Ryan MD during this encounter.    The student acted as my scribe. I have seen, examined and counseled the patient. I have reviewed and edited the note.   Rosy Ryan     Emailed father with results. Asked them to see Dr. Myrick in heme.    Again, thank you for allowing me to participate in the care of your patient.      Sincerely,    Rosy Ryan MD

## 2017-03-20 ENCOUNTER — TELEPHONE (OUTPATIENT)
Dept: OBGYN | Facility: CLINIC | Age: 22
End: 2017-03-20

## 2017-03-20 NOTE — TELEPHONE ENCOUNTER
Attempted to reach Anselmo at phone number [phone].    Left vm that I will mail out lab results done 3/8/17. Dr Ryan wants Pavithra to see specialist,Dr Myrick ,in Hematology. He will need to call and schedule an appointment for his daughter.  I told him I will include this informationl in mail.     Per our records we emailed the father the CBC results 3/8/17.

## 2017-03-20 NOTE — TELEPHONE ENCOUNTER
----- Message from Og Casillas sent at 3/20/2017  3:18 PM CDT -----  Regarding: Call pt's father lab results  Contact: [phone]  Pt's father, Anselmo Wing would like a call back with the lab results from last week ordered by Dr. Ryan. He can be reached at [phone] and stated that a VM can be left. Thanks.    GY    Please DO NOT send this message and/or reply back to sender.  Call Center Representatives DO NOT respond to messages.

## 2017-03-29 ENCOUNTER — ONCOLOGY VISIT (OUTPATIENT)
Dept: ONCOLOGY | Facility: CLINIC | Age: 22
End: 2017-03-29
Attending: INTERNAL MEDICINE
Payer: COMMERCIAL

## 2017-03-29 VITALS
SYSTOLIC BLOOD PRESSURE: 131 MMHG | TEMPERATURE: 97.6 F | RESPIRATION RATE: 16 BRPM | HEART RATE: 100 BPM | WEIGHT: 105.4 LBS | DIASTOLIC BLOOD PRESSURE: 80 MMHG | BODY MASS INDEX: 21.25 KG/M2 | HEIGHT: 59 IN | OXYGEN SATURATION: 98 %

## 2017-03-29 DIAGNOSIS — D75.839 THROMBOCYTOSIS: Primary | ICD-10-CM

## 2017-03-29 PROCEDURE — 99204 OFFICE O/P NEW MOD 45 MIN: CPT | Mod: ZP | Performed by: INTERNAL MEDICINE

## 2017-03-29 PROCEDURE — 99212 OFFICE O/P EST SF 10 MIN: CPT | Mod: ZF

## 2017-03-29 RX ORDER — ALPRAZOLAM 0.5 MG
0.5 TABLET ORAL PRN
Qty: 10 TABLET | Refills: 0 | Status: SHIPPED | OUTPATIENT
Start: 2017-03-29 | End: 2018-03-01

## 2017-03-29 ASSESSMENT — PAIN SCALES - GENERAL: PAINLEVEL: NO PAIN (0)

## 2017-03-29 NOTE — NURSING NOTE
"Pavithra Wing is a 22 year old female who presents for:  Chief Complaint   Patient presents with     Oncology Clinic Visit     Thrombocytosis        Initial Vitals:  /80 (BP Location: Right arm, Patient Position: Chair, Cuff Size: Adult Small)  Pulse 100  Temp 97.6  F (36.4  C) (Tympanic)  Resp 16  Ht 1.486 m (4' 10.5\")  Wt 47.8 kg (105 lb 6.4 oz)  LMP 01/20/2017  SpO2 98%  BMI 21.65 kg/m2 Estimated body mass index is 21.65 kg/(m^2) as calculated from the following:    Height as of this encounter: 1.486 m (4' 10.5\").    Weight as of this encounter: 47.8 kg (105 lb 6.4 oz).. Body surface area is 1.4 meters squared. BP completed using cuff size: small regular  No Pain (0) Patient's last menstrual period was 01/20/2017. Allergies and medications reviewed.     Medications: Medication refills not needed today.  Pharmacy name entered into [employer]:    [id number] - [place], [place] - [address] [facility] [address] [place] [facility] - [place], [place] - [address] [facility]    Comments: Patient received flu vaccine. See Immunizations.  Lorene Cartwright CMA        6 minutes for nursing intake (face to face time)   Lorene Cartwright CMA        "

## 2017-03-29 NOTE — PROGRESS NOTES
[facility] Initial Consultation    Pavithra Wing MRN# [id number]   Age: 22 year old YOB: 1995     Date of Service: March 29, 2017         Reason for Consult:   Thrombocytosis         History of Present Illness:   History obtained from chart review and confirmed with patient.    Pavithra Wing is a 22 year old with severe developmental delay, menorrhagia/AUB s/o hysterectomy + bl salpingectomy on 1/27/17 referred for incidental thrombocytosis.   She is here with her adoptive mother and father. Father is hearing impaired thus  used.     Surgery was uncomplicated. Platelet count noted to be >1 million. She had heavy vaginal bleeding prior to and for 1-2 weeks post-op. Now mother noted some mild gum bleeding with flossing. No epistaxis or melena. She is nonverbal so they do not know if she is having headaches or vision changes, etc. However she sometimes will grab her hair or hit her head to signal headache. Per dad she is acting her normal self. No obvious signs of abdominal pain, dyspnea or lower extremity swelling. Adopted so do not know if FH of blood disorders.     For blood draws, she needs to have couple people hold her down or medication to calm her down so she is more relaxed for the blood draw.           Review of Systems:   Unable to do.        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)      Hearing impaired      Heavy menstrual period      Hyperlipidaemia LDL goal < 160 8/12/2014     Irregular menstrual bleeding      Lactose intolerance 3/14/2011     Mastoiditis     (r)     PONV (postoperative nausea and vomiting)           Past Surgical History:     Past Surgical History:   Procedure Laterality Date     ADENOIDECTOMY  4/24/2014    Procedure: ADENOIDECTOMY;  Surgeon: Gabriella  MD Omar;  [facility]     C ANESTH,EYE EXAM      Retinopathy of prematurity     EXAM UNDER ANESTHESIA EAR(S)  4/27/2012    Procedure:EXAM UNDER ANESTHESIA EAR(S); Surgeon:OMAR QUIROZ; [facility]     EXAM UNDER ANESTHESIA EAR(S)  4/24/2014    Procedure: EXAM UNDER ANESTHESIA EAR(S);  Surgeon: Omar Quiroz MD;  [facility]     EXAM UNDER ANESTHESIA EAR(S) Bilateral 9/2/2015    Procedure: EXAM UNDER ANESTHESIA EAR(S);  Surgeon: Speedy Griffin MD;  [facility]     EXAM UNDER ANESTHESIA EYE(S)  4/24/2014    Procedure: EXAM UNDER ANESTHESIA EYE(S);  Surgeon: Romi Mary MD;  [facility]     EXAM UNDER ANESTHESIA NOSE  4/24/2014    Procedure: EXAM UNDER ANESTHESIA NOSE;  Surgeon: Omar Quiroz MD;  [facility]     EXAM UNDER ANESTHESIA, RESTORATIONS, EXTRACTION(S) DENTAL COMPLEX, COMBINED  3/29/2012    Procedure:COMBINED EXAM UNDER ANESTHESIA, RESTORATIONS, EXTRACTION(S) DENTAL COMPLEX; Dental Exam, Radiograph, Dental Restorations, Periodontal Therapy, right ear mold impression.; Surgeon:PRITI VILLEGAS; [facility]     EXAM UNDER ANESTHESIA, RESTORATIONS, EXTRACTION(S) DENTAL, COMBINED  4/24/2014    Procedure: COMBINED EXAM UNDER ANESTHESIA, RESTORATIONS, EXTRACTION(S) DENTAL;  Surgeon: Yun Flores DDS;  [facility]     EXAM UNDER ANESTHESIA, RESTORATIONS, EXTRACTION(S) DENTAL, COMBINED N/A 9/2/2015    Procedure: COMBINED EXAM UNDER ANESTHESIA, RESTORATIONS, EXTRACTION(S) DENTAL;  Surgeon: Priti Villegas DDS;  [facility]     IMPLANT SOPHONO DEVICE  4/27/2012    Procedure:IMPLANT SOPHONO DEVICE; Left Sophono Implant   ; Surgeon:OMAR QUIROZ; [facility]     LAPAROSCOPIC HYSTERECTOMY TOTAL, SALPINGECTOMY BILATERAL Bilateral 1/20/2017    Procedure: LAPAROSCOPIC HYSTERECTOMY TOTAL, SALPINGECTOMY BILATERAL;  Surgeon: Rosy Ryan MD;  [facility]     PE TUBES       PICC INSERTION      removed 2/25/09          Social History:  "    Social History     Social History     Marital status: Single     Spouse name: N/A     Number of children: N/A     Years of education: N/A     Occupational History     Not on file.     Social History Main Topics     Smoking status: Never Smoker     Smokeless tobacco: Never Used     Alcohol use No     Drug use: No     Sexual activity: No     Other Topics Concern      Service No     Blood Transfusions No     Caffeine Concern No     Occupational Exposure Yes     Hobby Hazards No     Sleep Concern No     Stress Concern No     Weight Concern No     Special Diet No     Back Care No     Exercise No     Bike Helmet No     Seat Belt No     Self-Exams No     Social History Narrative    3/8/17    Dad's email: [email or web address]            1/20/17    Adopted from [place] age 14yo by her father who is now her legal guardian.    Rosy Ryan                              Family History:   She is adopted so they do not know her FH.   Family History   Problem Relation Age of Onset     Unknown/Adopted Mother      Unknown/Adopted Father      Unknown/Adopted Other      adopted          Allergies:     Allergies   Allergen Reactions     Benzalkonium Chloride Rash          Medications:     Current Outpatient Prescriptions   Medication     ALPRAZolam (XANAX) 0.5 MG tablet     acetaminophen (TYLENOL) 325 MG tablet     senna-docusate (SENOKOT-S;PERICOLACE) 8.6-50 MG per tablet     order for DME     Fiber, Guar Gum, CHEW     FEXOFENADINE HCL PO     MULTI-VITAMIN OR TABS     FIBER THERAPY 2 GM/10.2GM OR POWD     No current facility-administered medications for this visit.               Physical Exam:   /80 (BP Location: Right arm, Patient Position: Chair, Cuff Size: Adult Small)  Pulse 100  Temp 97.6  F (36.4  C) (Tympanic)  Resp 16  Ht 1.486 m (4' 10.5\")  Wt 47.8 kg (105 lb 6.4 oz)  LMP 01/20/2017  SpO2 98%  BMI 21.65 kg/m2  General: Pleasant. Appears well, in no acute distress.  Heme/Lymph: No overt bleeding. "   Skin: No concerning lesions, rash on exposed surfaces.  HEENT: NCAT,  anicteric sclera. Oral mucosa pink and moist with no lesions or thrush.  Neck: Neck supple. .  Respiratory: Non-labored breathing, lungs clear to auscultation bilaterally.  Cardiovascular: Regular rate and rhythm. No murmur or rub.   Gastrointestinal: soft, NT, ND. No palpable masses or organomegaly.  Musculoskeletal: No extremity edema.  Neurologic: nonverbal, does not make eye contact           Data:     Lab Results   Component Value Date    WBC 12.7 03/08/2017     Lab Results   Component Value Date    RBC 5.28 03/08/2017     Lab Results   Component Value Date    HGB 13.4 03/08/2017     Lab Results   Component Value Date    HCT 41.7 03/08/2017     No components found for: MCT  Lab Results   Component Value Date    MCV 79 03/08/2017     Lab Results   Component Value Date    MCH 25.4 03/08/2017     Lab Results   Component Value Date    MCHC 32.1 03/08/2017     Lab Results   Component Value Date    RDW 13.9 03/08/2017     Lab Results   Component Value Date    PLT 1744 03/08/2017     Ferritin 44           Assessment and Plan:   #Severe thrombocytosis, 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 This was >1 million. No recent prior counts available for comparison than was normal in 2009. Discussed our suspicion for a clonal blood disorder called essential thrombocytosis (ET), a subtype of myeloproliferative neoplasm. Will test for Jak2, MPL and CALR today. Per parents, which we agree, will not pursue bone marrow biopsy to not put her through this as it requires full sedation.   Difficult situation as she does not have any expressive skills thus unable to communicate if she is having any symptoms (headache, vision changes, stroke, DVT/PE, bleeding, etc). Parents instructed on what to watch for- symptoms of  abdominal pain, leg swelling, shortness or breath, signs of headache or worsening bleeding.   Reviewed that in general the lifespan for ET is similar to the general population. Suspect CALR mutation given her young age with impressive thrombocytosis (>1.5 million).  Reviewed that therapy is used to decrease risk of thrombosis or symptoms.   Given her easy gum bleeding and plts >1 million will screen for acquired VWD. Will avoid aspirin if has acquired VWD.  Briefly reviewed the therapy options including observation, interferon or hydrea. Reviewed flu-like side effects of interferon and they are adamant they would not want to put her through this. Parents would prefer to not have her on any drug therapy is possible.     Plan:  -send CBC, peripheral smear, NGS MPN panel (JAK2, CALR, MPL), VWD panel  -RTC in 3-4 weeks to discuss results    Addendum: attempted to draw labs today however patient too agitated, will give 0.5mg alprazolam 30 min prior to lab draw Saturday    Patient seen and discussed with Dr. Myrick.  Erum Millan MD  Heme Onc Fellow    Physician Attestation   I saw this patient with the resident and agree with the resident s findings and plan of care as documented in the resident s note.      Briefly, Ms Wing is a 23 YO woman with severe developmental delay and thrombocytosis.  We counseled her adoptive parents that our suspicion is for CALR+ ET but we need to confirm with NGS.  vWD panel will help exclude acquired vWD and prognosticate risk of bleeding.  Sometimes this can inform decision making for cytoreductive therapy but given her inability to communicate this wouldn't necessarily mean I would treat yet.    We discussed treatment options including IFN, Hydroxyurea, observation.  I do wonder if her thrombocytosis is exaggerated due to fear of needles.  She will return in a couple weeks to discuss results of the studies.      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  Likely contributed to her menorrhagia.    Taurus Myrick MD   of Medicine  [facility]

## 2017-03-29 NOTE — LETTER
3/29/2017       RE: Pavithra Wing  [address] [facility] [address]     Dear Colleague,    Thank you for referring your patient, Pavithra Wing, to the [facility]. Please see a copy of my visit note below.    [facility] Initial Consultation    Pavithra Wing MRN# [id number]   Age: 22 year old YOB: 1995     Date of Service: March 29, 2017         Reason for Consult:   Thrombocytosis         History of Present Illness:   History obtained from chart review and confirmed with patient.    Pavithra Wing is a 22 year old with severe developmental delay, menorrhagia/AUB s/o hysterectomy + bl salpingectomy on 1/27/17 referred for incidental thrombocytosis.   She is here with her adoptive mother and father. Father is hearing impaired thus  used.     Surgery was uncomplicated. Platelet count noted to be >1 million. She had heavy vaginal bleeding prior to and for 1-2 weeks post-op. Now mother noted some mild gum bleeding with flossing. No epistaxis or melena. She is nonverbal so they do not know if she is having headaches or vision changes, etc. However she sometimes will grab her hair or hit her head to signal headache. Per dad she is acting her normal self. No obvious signs of abdominal pain, dyspnea or lower extremity swelling. Adopted so do not know if FH of blood disorders.     For blood draws, she needs to have couple people hold her down or medication to calm her down so she is more relaxed for the blood draw.           Review of Systems:   Unable to do.        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)      Hearing impaired      Heavy menstrual period      Hyperlipidaemia LDL goal < 160 8/12/2014     Irregular menstrual bleeding       Lactose intolerance 3/14/2011     Mastoiditis     (r)     PONV (postoperative nausea and vomiting)           Past Surgical History:     Past Surgical History:   Procedure Laterality Date     ADENOIDECTOMY  4/24/2014    Procedure: ADENOIDECTOMY;  Surgeon: Omar Quiroz MD;  [facility]     C ANESTH,EYE EXAM      Retinopathy of prematurity     EXAM UNDER ANESTHESIA EAR(S)  4/27/2012    Procedure:EXAM UNDER ANESTHESIA EAR(S); Surgeon:OMAR QUIROZ; [facility]     EXAM UNDER ANESTHESIA EAR(S)  4/24/2014    Procedure: EXAM UNDER ANESTHESIA EAR(S);  Surgeon: Omar Quiroz MD;  [facility]     EXAM UNDER ANESTHESIA EAR(S) Bilateral 9/2/2015    Procedure: EXAM UNDER ANESTHESIA EAR(S);  Surgeon: Speedy Griffin MD;  [facility]     EXAM UNDER ANESTHESIA EYE(S)  4/24/2014    Procedure: EXAM UNDER ANESTHESIA EYE(S);  Surgeon: Romi Mary MD;  [facility]     EXAM UNDER ANESTHESIA NOSE  4/24/2014    Procedure: EXAM UNDER ANESTHESIA NOSE;  Surgeon: Omar Quiroz MD;  [facility]     EXAM UNDER ANESTHESIA, RESTORATIONS, EXTRACTION(S) DENTAL COMPLEX, COMBINED  3/29/2012    Procedure:COMBINED EXAM UNDER ANESTHESIA, RESTORATIONS, EXTRACTION(S) DENTAL COMPLEX; Dental Exam, Radiograph, Dental Restorations, Periodontal Therapy, right ear mold impression.; Surgeon:PRITI VILLEGAS; [facility]     EXAM UNDER ANESTHESIA, RESTORATIONS, EXTRACTION(S) DENTAL, COMBINED  4/24/2014    Procedure: COMBINED EXAM UNDER ANESTHESIA, RESTORATIONS, EXTRACTION(S) DENTAL;  Surgeon: Yun Flores DDS;  [facility]     EXAM UNDER ANESTHESIA, RESTORATIONS, EXTRACTION(S) DENTAL, COMBINED N/A 9/2/2015    Procedure: COMBINED EXAM UNDER ANESTHESIA, RESTORATIONS, EXTRACTION(S) DENTAL;  Surgeon: Priti Villegas DDS;  [facility]     IMPLANT SOPHONO DEVICE  4/27/2012    Procedure:IMPLANT SOPHONO DEVICE; Left Sophono Implant   ; Surgeon:OMAR QUIROZ; [facility]     LAPAROSCOPIC  HYSTERECTOMY TOTAL, SALPINGECTOMY BILATERAL Bilateral 1/20/2017    Procedure: LAPAROSCOPIC HYSTERECTOMY TOTAL, SALPINGECTOMY BILATERAL;  Surgeon: Rosy Ryan MD;  [facility]     PE TUBES       PICC INSERTION      removed 2/25/09          Social History:     Social History     Social History     Marital status: Single     Spouse name: N/A     Number of children: N/A     Years of education: N/A     Occupational History     Not on file.     Social History Main Topics     Smoking status: Never Smoker     Smokeless tobacco: Never Used     Alcohol use No     Drug use: No     Sexual activity: No     Other Topics Concern      Service No     Blood Transfusions No     Caffeine Concern No     Occupational Exposure Yes     Hobby Hazards No     Sleep Concern No     Stress Concern No     Weight Concern No     Special Diet No     Back Care No     Exercise No     Bike Helmet No     Seat Belt No     Self-Exams No     Social History Narrative    3/8/17    Dad's email: [email or web address]            1/20/17    Adopted from [place] age 14yo by her father who is now her legal guardian.    Rosy Ryan                              Family History:   She is adopted so they do not know her FH.   Family History   Problem Relation Age of Onset     Unknown/Adopted Mother      Unknown/Adopted Father      Unknown/Adopted Other      adopted          Allergies:     Allergies   Allergen Reactions     Benzalkonium Chloride Rash          Medications:     Current Outpatient Prescriptions   Medication     ALPRAZolam (XANAX) 0.5 MG tablet     acetaminophen (TYLENOL) 325 MG tablet     senna-docusate (SENOKOT-S;PERICOLACE) 8.6-50 MG per tablet     order for DME     Fiber, Guar Gum, CHEW     FEXOFENADINE HCL PO     MULTI-VITAMIN OR TABS     FIBER THERAPY 2 GM/10.2GM OR POWD     No current facility-administered medications for this visit.               Physical Exam:   /80 (BP Location: Right arm, Patient Position: Chair,  "Cuff Size: Adult Small)  Pulse 100  Temp 97.6  F (36.4  C) (Tympanic)  Resp 16  Ht 1.486 m (4' 10.5\")  Wt 47.8 kg (105 lb 6.4 oz)  LMP 01/20/2017  SpO2 98%  BMI 21.65 kg/m2  General: Pleasant. Appears well, in no acute distress.  Heme/Lymph: No overt bleeding.   Skin: No concerning lesions, rash on exposed surfaces.  HEENT: NCAT,  anicteric sclera. Oral mucosa pink and moist with no lesions or thrush.  Neck: Neck supple. .  Respiratory: Non-labored breathing, lungs clear to auscultation bilaterally.  Cardiovascular: Regular rate and rhythm. No murmur or rub.   Gastrointestinal: soft, NT, ND. No palpable masses or organomegaly.  Musculoskeletal: No extremity edema.  Neurologic: nonverbal, does not make eye contact           Data:     Lab Results   Component Value Date    WBC 12.7 03/08/2017     Lab Results   Component Value Date    RBC 5.28 03/08/2017     Lab Results   Component Value Date    HGB 13.4 03/08/2017     Lab Results   Component Value Date    HCT 41.7 03/08/2017     No components found for: MCT  Lab Results   Component Value Date    MCV 79 03/08/2017     Lab Results   Component Value Date    MCH 25.4 03/08/2017     Lab Results   Component Value Date    MCHC 32.1 03/08/2017     Lab Results   Component Value Date    RDW 13.9 03/08/2017     Lab Results   Component Value Date    PLT 1744 03/08/2017     Ferritin 44           Assessment and Plan:   #Severe thrombocytosis, 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 This was >1 million. No recent prior counts available for comparison than was normal in 2009. Discussed our suspicion for a clonal blood disorder called essential thrombocytosis (ET), a subtype of myeloproliferative neoplasm. Will test for Jak2, MPL and CALR today. Per parents, which we agree, will not pursue bone marrow biopsy to not put her through this " as it requires full sedation.   Difficult situation as she does not have any expressive skills thus unable to communicate if she is having any symptoms (headache, vision changes, stroke, DVT/PE, bleeding, etc). Parents instructed on what to watch for- symptoms of abdominal pain, leg swelling, shortness or breath, signs of headache or worsening bleeding.   Reviewed that in general the lifespan for ET is similar to the general population. Suspect CALR mutation given her young age with impressive thrombocytosis (>1.5 million).  Reviewed that therapy is used to decrease risk of thrombosis or symptoms.   Given her easy gum bleeding and plts >1 million will screen for acquired VWD. Will avoid aspirin if has acquired VWD.  Briefly reviewed the therapy options including observation, interferon or hydrea. Reviewed flu-like side effects of interferon and they are adamant they would not want to put her through this. Parents would prefer to not have her on any drug therapy is possible.     Plan:  -send CBC, peripheral smear, NGS MPN panel (JAK2, CALR, MPL), VWD panel  -RTC in 3-4 weeks to discuss results    Addendum: attempted to draw labs today however patient too agitated, will give 0.5mg alprazolam 30 min prior to lab draw Saturday    Patient seen and discussed with Dr. Myrick.  Erum Millan MD  Heme Onc Fellow    Physician Attestation   I saw this patient with the resident and agree with the resident s findings and plan of care as documented in the resident s note.      Briefly, Ms Wing is a 23 YO woman with severe developmental delay and thrombocytosis.  We counseled her adoptive parents that our suspicion is for CALR+ ET but we need to confirm with NGS.  vWD panel will help exclude acquired vWD and prognosticate risk of bleeding.  Sometimes this can inform decision making for cytoreductive therapy but given her inability to communicate this wouldn't necessarily mean I would treat yet.    We discussed treatment options  including IFN, Hydroxyurea, observation.  I do wonder if her thrombocytosis is exaggerated due to fear of needles.  She will return in a couple weeks to discuss results of the studies.      Taurus Myrick MD   of Medicine  [facility]

## 2017-03-29 NOTE — NURSING NOTE
A lab draw was attempted but unsuccessful. The patient didn't want to have her labs drawn. The needle went into her right arm and she moved and it came out right away. The patient will be returning to have her labs drawn another day.

## 2017-04-01 DIAGNOSIS — D75.839 THROMBOCYTOSIS: ICD-10-CM

## 2017-04-01 LAB
BASOPHILS # BLD AUTO: 0.1 10E9/L (ref 0–0.2)
BASOPHILS NFR BLD AUTO: 0.7 %
DIFFERENTIAL METHOD BLD: ABNORMAL
EOSINOPHIL # BLD AUTO: 0.2 10E9/L (ref 0–0.7)
EOSINOPHIL NFR BLD AUTO: 1.6 %
ERYTHROCYTE [DISTWIDTH] IN BLOOD BY AUTOMATED COUNT: 14.3 % (ref 10–15)
HCT VFR BLD AUTO: 41.9 % (ref 35–47)
HGB BLD-MCNC: 13 G/DL (ref 11.7–15.7)
IMM GRANULOCYTES # BLD: 0 10E9/L (ref 0–0.4)
IMM GRANULOCYTES NFR BLD: 0.3 %
LYMPHOCYTES # BLD AUTO: 4 10E9/L (ref 0.8–5.3)
LYMPHOCYTES NFR BLD AUTO: 34.3 %
MCH RBC QN AUTO: 24.5 PG (ref 26.5–33)
MCHC RBC AUTO-ENTMCNC: 31 G/DL (ref 31.5–36.5)
MCV RBC AUTO: 79 FL (ref 78–100)
MONOCYTES # BLD AUTO: 1.1 10E9/L (ref 0–1.3)
MONOCYTES NFR BLD AUTO: 9.4 %
NEUTROPHILS # BLD AUTO: 6.3 10E9/L (ref 1.6–8.3)
NEUTROPHILS NFR BLD AUTO: 53.7 %
NRBC # BLD AUTO: 0 10*3/UL
NRBC BLD AUTO-RTO: 0 /100
PLATELET # BLD AUTO: 1651 10E9/L (ref 150–450)
RBC # BLD AUTO: 5.31 10E12/L (ref 3.8–5.2)
RETICS # AUTO: 87.1 10E9/L (ref 25–95)
RETICS/RBC NFR AUTO: 1.6 % (ref 0.5–2)
WBC # BLD AUTO: 11.8 10E9/L (ref 4–11)

## 2017-04-03 DIAGNOSIS — H61.23 BILATERAL IMPACTED CERUMEN: Primary | ICD-10-CM

## 2017-04-03 LAB
FACT VIII ACT/NOR PPP: 118 % (ref 55–200)
VWF CBA/VWF AG PPP IA-RTO: 126 % (ref 50–200)
VWF:AC ACT/NOR PPP IA: 81 % (ref 50–180)

## 2017-04-04 LAB
COPATH REPORT: NORMAL
VWF MULTIMERS PPP QL: NORMAL

## 2017-04-06 LAB — VWF:RCO ACT/NOR PPP PL AGG: 53 %

## 2017-04-11 LAB — VWF MULTIMERS PPP QL: NORMAL

## 2017-04-12 LAB — COPATH REPORT: NORMAL

## 2017-04-13 LAB — VON WILLEBRAND INTERPRETATION: NORMAL

## 2017-04-26 ENCOUNTER — ONCOLOGY VISIT (OUTPATIENT)
Dept: ONCOLOGY | Facility: CLINIC | Age: 22
End: 2017-04-26
Attending: INTERNAL MEDICINE
Payer: COMMERCIAL

## 2017-04-26 VITALS
DIASTOLIC BLOOD PRESSURE: 91 MMHG | TEMPERATURE: 97.4 F | SYSTOLIC BLOOD PRESSURE: 143 MMHG | WEIGHT: 105.9 LBS | OXYGEN SATURATION: 98 % | BODY MASS INDEX: 21.76 KG/M2 | HEART RATE: 101 BPM

## 2017-04-26 DIAGNOSIS — D47.3 ESSENTIAL THROMBOCYTHEMIA (H): Primary | ICD-10-CM

## 2017-04-26 PROCEDURE — 99212 OFFICE O/P EST SF 10 MIN: CPT

## 2017-04-26 PROCEDURE — 99214 OFFICE O/P EST MOD 30 MIN: CPT | Mod: ZP | Performed by: INTERNAL MEDICINE

## 2017-04-26 PROCEDURE — T1013 SIGN LANG/ORAL INTERPRETER: HCPCS | Mod: U3,ZF

## 2017-04-26 ASSESSMENT — PAIN SCALES - GENERAL: PAINLEVEL: NO PAIN (0)

## 2017-04-26 NOTE — MR AVS SNAPSHOT
After Visit Summary   4/26/2017    Pavithra Wing    MRN: [id number]           Patient Information     Date Of Birth          1995        Visit Information        Provider Department      4/26/2017 5:15 PM Claudia Hare Marshall A, MD [facility]        Care Instructions    Your visit the [facility] was to discuss the results of the testing we did at your last visit for high platelets.  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  The most important thing to know about this disease is that for most people this does not change their lifespan.  Only for a small number of people do we see problems.  The most common complication is the formation of blood clots.  Clots can happen in the legs (called a Deep Vein Thrombosis), in the veins of the abdomen (called portal vein thrombosis) or in the lungs (called a pulmonary embolism).  While these are important complications to know about we know that people who are <65 years old and people who have the CALR mutation are the people at the lowest risk for these complications.    Right now there is no clear benefit to using treatment to lower the platelet count.  We would use treatment to lower the platelet count if:  1. Bleeding occurred (nose bleeds, blood in the toilet, new anemia or low red blood cells causing fatigue)  2. A blood clot occurred    I think the most likely complication we would see is bleeding.  When the platelets are very high like this we can see consumption of a sticky protein that is important to protect us from bleeding called von Willebrand Factor.  We know from the first set of blood tests that this protein is low.  This likely contributed to her heavy menstrual periods.  However, again we would only treat to low the platelet count (which then makes the von Willebrand Factor level rise) if she was having  symptoms (bleeding) from this finding in the blood.    I will plan to Pathamon you back in 3 months for another blood test.      If you have questions or concerns before your next appointment you can call Dr Myrick's Care Coordinator, Sarah Hamilton at:  [phone]    You can also reach Dr Myrick through [employer] or at his office number at [phone]          Follow-ups after your visit        Follow-up notes from your care team     Return in about 3 months (around 7/26/2017).      Your next 10 appointments already scheduled     Jul 21, 2017  9:45 AM CDT   [facility] Lab Draw with  [facility] LAB DRAW   [facility] Draw ([facility])    [address] [place] [id number] [phone]            Jul 21, 2017 10:30 AM CDT   (Arrive by 10:15 AM)   Return Visit with Taurus Myrick MD   [facility] ([facility])    [address] [place] [id number] [phone]              Who to contact     If you have questions or need follow up information about today's clinic visit or your schedule please contact [facility] directly at [phone].  Normal or non-critical lab and imaging results will be communicated to you by [employer], letter or phone within 4 business days after the clinic has received the results. If you do not hear from us within 7 days, please contact the clinic through [employer] or phone. If you have a critical or abnormal lab result, we will notify you by phone as soon as possible.  Submit refill requests through [employer] or call your pharmacy and they will forward the refill request to us. Please allow 3 business days for your refill to be completed.          Additional Information About Your Visit        [employer] Information     [employer] lets you send messages to your doctor, view your test results, renew your prescriptions, schedule appointments and more. To sign  "up, go to [email or web address] . Click on \"Log in\" on the left side of the screen, which will take you to the Welcome page. Then click on \"Sign up Now\" on the right side of the page.     You will be asked to enter the access code listed below, as well as some personal information. Please follow the directions to create your username and password.     Your access code is: [email or web address]  Expires: 2017 10:00 AM     Your access code will  in 90 days. If you need help or a new code, please call your [place] clinic or [phone].        Care EveryWhere ID     This is your Care EveryWhere ID. This could be used by other organizations to access your [place] medical records  [id number]        Your Vitals Were     Pulse Temperature Last Period Pulse Oximetry BMI (Body Mass Index)       101 97.4  F (36.3  C) (Oral) 2017 98% 21.76 kg/m2        Blood Pressure from Last 3 Encounters:   17 (!) 143/91   17 131/80   17 123/82    Weight from Last 3 Encounters:   17 48 kg (105 lb 14.4 oz)   17 47.8 kg (105 lb 6.4 oz)   17 47.2 kg (104 lb 1.6 oz)              Today, you had the following     No orders found for display       Primary Care Provider Office Phone # Fax #    Ngozi SEB Yepez [facility] [phone]       [facility] [address] [facility] [address]        Thank you!     Thank you for choosing [facility]  for your care. Our goal is always to provide you with excellent care. Hearing back from our patients is one way we can continue to improve our services. Please take a few minutes to complete the written survey that you may receive in the mail after your visit with us. Thank you!             Your Updated Medication List - Protect others around you: Learn how to safely use, store and throw away your medicines at www.disposemymeds.org.          This list is accurate as of: 17  6:27 PM.  Always use your " most recent med list.                   Brand Name Dispense Instructions for use    acetaminophen 325 MG tablet    TYLENOL    100 tablet    Take 2 tablets (650 mg) by mouth every 4 hours as needed for mild pain       ALPRAZolam 0.5 MG tablet    XANAX    10 tablet    Take 1 tablet (0.5 mg) by mouth as needed for anxiety (for anxiety with blood draws)       FEXOFENADINE HCL PO      Take 180 mg by mouth       Fiber (Guar Gum) Chew      Take 2.5 g by mouth 2 times daily       Multi-vitamin Tabs tablet   Generic drug:  multivitamin, therapeutic with minerals      1 DAILY       order for DME     1 Device    Equipment being ordered: orthotics       senna-docusate 8.6-50 MG per tablet    SENOKOT-S;PERICOLACE    60 tablet    Take 1 tablet by mouth 2 times daily as needed for constipation

## 2017-04-26 NOTE — PATIENT INSTRUCTIONS
Your visit the [facility] was to discuss the results of the testing we did at your last visit for high platelets.  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  The most important thing to know about this disease is that for most people this does not change their lifespan.  Only for a small number of people do we see problems.  The most common complication is the formation of blood clots.  Clots can happen in the legs (called a Deep Vein Thrombosis), in the veins of the abdomen (called portal vein thrombosis) or in the lungs (called a pulmonary embolism).  While these are important complications to know about we know that people who are <65 years old and people who have the CALR mutation are the people at the lowest risk for these complications.    Right now there is no clear benefit to using treatment to lower the platelet count.  We would use treatment to lower the platelet count if:  1. Bleeding occurred (nose bleeds, blood in the toilet, new anemia or low red blood cells causing fatigue)  2. A blood clot occurred    I think the most likely complication we would see is bleeding.  When the platelets are very high like this we can see consumption of a sticky protein that is important to protect us from bleeding called von Willebrand Factor.  We know from the first set of blood tests that this protein is low.  This likely contributed to her heavy menstrual periods.  However, again we would only treat to low the platelet count (which then makes the von Willebrand Factor level rise) if she was having symptoms (bleeding) from this finding in the blood.    I will plan to Pathamon you back in 3 months for another blood test.      If you have questions or concerns before your next appointment you can call Dr Myrick's Care Coordinator, Sarah Hamilton at:  [phone]    You can also reach Dr Myrick through [employer] or at his office number at  [phone]

## 2017-04-26 NOTE — NURSING NOTE
"Oncology Rooming Note    April 26, 2017 6:00 PM   Pavithra Wing is a 53 year old female who presents for: Oncology Clinic Visit    Initial Vitals: BP (!) 143/91 (BP Location: Right arm, Patient Position: Chair, Cuff Size: Adult Small)  Pulse 101  Temp 97.4  F (36.3  C) (Oral)  Wt 48 kg (105 lb 14.4 oz)  LMP 01/20/2017  SpO2 98%  BMI 21.76 kg/m2 Estimated body mass index is 21.76 kg/(m^2) as calculated from the following:    Height as of 3/29/17: 1.486 m (4' 10.5\").    Weight as of this encounter: 48 kg (105 lb 14.4 oz). Body surface area is 1.41 meters squared.  No Pain (0) Comment: Data Unavailable   Patient's last menstrual period was 01/20/2017.  Allergies reviewed: Yes  Medications reviewed: Yes    Medications: Medication refills not needed today.  Pharmacy name entered into [facility]:    [facility] #[id number] - [place], [place] - [address],  [address] [address] [place] [facility] [place], [place] - [address]    Clinical concerns:     5 minutes for nursing intake (face to face time)     Fatemeh Dacosta CMA                "

## 2017-04-26 NOTE — LETTER
2017       RE: Pavithra Wing  [address] [place] [address]     Dear Colleague,    Thank you for referring your patient, Pavithra Wing, to the [facility]. Please see a copy of my visit note below.        [facility] Hematology Follow Up Visit    Outpatient Visit Note:    Patient: Pavithra Wing  MRN: [id number]  : 1995  DERRELL: May 4, 2017    Pavithra Wing is a 22 year old woman with a history of severe cognitive impairment, blindness with thrombocytosis and menorrhagia who returns for routine follow up.      Forward History:  2017     - Thrombocytosis noted after hysterectomy for menorrhagia.  No significant postop bleeding.  2017    - CALR mutation detected, acquired vWD but no vWD is still in normal range, missing HMWM    Visit History:  Ms Wing presents with her father today who provides the history by way of .  Her reports no evidence of Pavithra feeling ill.  No evidence of constitutional symptoms or loss of appetite, early satiety.  He says she is sleeping well and energy is good.  He notes no nose bleeds or gingival bleeding.    Medications:  Current Outpatient Prescriptions   Medication Sig Dispense Refill     ALPRAZolam (XANAX) 0.5 MG tablet Take 1 tablet (0.5 mg) by mouth as needed for anxiety (for anxiety with blood draws) 10 tablet 0     acetaminophen (TYLENOL) 325 MG tablet Take 2 tablets (650 mg) by mouth every 4 hours as needed for mild pain 100 tablet 0     senna-docusate (SENOKOT-S;PERICOLACE) 8.6-50 MG per tablet Take 1 tablet by mouth 2 times daily as needed for constipation 60 tablet 0     order for DME Equipment being ordered: orthotics 1 Device 0     Fiber, Guar Gum, CHEW Take 2.5 g by mouth 2 times daily       FEXOFENADINE HCL PO Take 180 mg by mouth       MULTI-VITAMIN OR TABS 1 DAILY          Allergies:  Allergies   Allergen Reactions     Benzalkonium  Chloride Rash       ROS:  A 14 point ROS is negative except as stated in the HPI    Objective:  Vitals: B/P: 143/91, T: 97.4, P: 101, R: Data Unavailable, Wt: 105 lbs 14.4 oz  Exam:   Gen: Appears well, no distress  Ext: no edema    Labs:  Results for DAYTON WING (MRN [id number]) as of 5/4/2017 12:15   Ref. 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    Plan:  1. Majority of today's visit was spent counseling the patient regarding the diagnosis and prognosis of ET.  We also discussed that treatment is primarily based on symptoms from ET such as constitutional symptoms, splenomegaly, bleeding from acquired vWD.  While there is controversy about treatment for PLT > 1500, I think that I'd like to spare her exposure to Hydroxyurea as long as possible.  Usually I would recommend consideration of IFN for young patient but she is not a good candidate given her cognitive impairment (even a blood draw requires anxiolytics).  2. Observation with a plan for HU initiation if she develops bleeding or other symptoms of ET  3. She is very low risk for thrombosis but explained the symptoms that her parents should look for for thrombosis.    The patient is given our center's contact information and is instructed to call if she should have any further questions or concerns.  Otherwise, we will plan on seeing her back in 3 months.      Total Time Spent:  I spent a total of 25 minutes face-to-face with Dayton Wing during today's office visit.  Over 50% of this time was  spent counseling the patient and/or coordinating care regarding ET.      Taurus Myrick MD   of Medicine  [facility] [employer] [facility]

## 2017-05-04 PROBLEM — D47.3 ESSENTIAL THROMBOCYTHEMIA (H): Status: ACTIVE | Noted: 2017-05-04

## 2017-05-04 NOTE — PROGRESS NOTES
[facility] Hematology Follow Up Visit    Outpatient Visit Note:    Patient: Pavithra Wing  MRN: [id number]  : 1995  DERRELL: May 4, 2017    Pavithra Wing is a 22 year old woman with a history of severe cognitive impairment, blindness with thrombocytosis and menorrhagia who returns for routine follow up.      Forward History:  2017     - Thrombocytosis noted after hysterectomy for menorrhagia.  No significant postop bleeding.  2017    - CALR mutation detected, acquired vWD but no vWD is still in normal range, missing HMWM    Visit History:  Ms Wing presents with her father today who provides the history by way of .  Her reports no evidence of Pavithra feeling ill.  No evidence of constitutional symptoms or loss of appetite, early satiety.  He says she is sleeping well and energy is good.  He notes no nose bleeds or gingival bleeding.    Medications:  Current Outpatient Prescriptions   Medication Sig Dispense Refill     ALPRAZolam (XANAX) 0.5 MG tablet Take 1 tablet (0.5 mg) by mouth as needed for anxiety (for anxiety with blood draws) 10 tablet 0     acetaminophen (TYLENOL) 325 MG tablet Take 2 tablets (650 mg) by mouth every 4 hours as needed for mild pain 100 tablet 0     senna-docusate (SENOKOT-S;PERICOLACE) 8.6-50 MG per tablet Take 1 tablet by mouth 2 times daily as needed for constipation 60 tablet 0     order for DME Equipment being ordered: orthotics 1 Device 0     Fiber, Guar Gum, CHEW Take 2.5 g by mouth 2 times daily       FEXOFENADINE HCL PO Take 180 mg by mouth       MULTI-VITAMIN OR TABS 1 DAILY          Allergies:  Allergies   Allergen Reactions     Benzalkonium Chloride Rash       ROS:  A 14 point ROS is negative except as stated in the HPI    Objective:  Vitals: B/P: 143/91, T: 97.4, P: 101, R: Data Unavailable, Wt: 105 lbs 14.4 oz  Exam:   Gen: Appears well, no distress  Ext: no edema    Labs:  Results for LUCIA,  DAYTON FORRESTER (MRN [id number]) as of 5/4/2017 12:15   Ref. 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    Plan:  1. Majority of today's visit was spent counseling the patient regarding the diagnosis and prognosis of ET.  We also discussed that treatment is primarily based on symptoms from ET such as constitutional symptoms, splenomegaly, bleeding from acquired vWD.  While there is controversy about treatment for PLT > 1500, I think that I'd like to spare her exposure to Hydroxyurea as long as possible.  Usually I would recommend consideration of IFN for young patient but she is not a good candidate given her cognitive impairment (even a blood draw requires anxiolytics).  2. Observation with a plan for HU initiation if she develops bleeding or other symptoms of ET  3. She is very low risk for thrombosis but explained the symptoms that her parents should look for for thrombosis.    The patient is given our center's contact information and is instructed to call if she should have any further questions or concerns.  Otherwise, we will plan on seeing her back in 3 months.      Total Time Spent:  I spent a total of 25 minutes face-to-face with Dayton Wing during today's office visit.  Over 50% of this time was spent counseling the patient and/or coordinating care regarding ET.      Taurus Myrick MD   of Medicine  [facility] [employer]

## 2017-07-20 ENCOUNTER — CARE COORDINATION (OUTPATIENT)
Dept: OTOLARYNGOLOGY | Facility: CLINIC | Age: 22
End: 2017-07-20

## 2017-07-20 NOTE — PROGRESS NOTES
Teaching Flowsheet - ENT   Relevant Diagnosis/teaching : ear exam/ear cleaning under anesthesia      MESSAGE LEFT ON MOM'S VOICE MAIL- RETURN CALL FOR PRE OP TEACHING.  Father, Anselmo request email communication- see note from 7-25-17.

## 2017-07-21 ENCOUNTER — ONCOLOGY VISIT (OUTPATIENT)
Dept: ONCOLOGY | Facility: CLINIC | Age: 22
End: 2017-07-21
Attending: INTERNAL MEDICINE
Payer: COMMERCIAL

## 2017-07-21 VITALS
HEART RATE: 111 BPM | OXYGEN SATURATION: 98 % | TEMPERATURE: 97.6 F | SYSTOLIC BLOOD PRESSURE: 144 MMHG | WEIGHT: 103.6 LBS | RESPIRATION RATE: 16 BRPM | DIASTOLIC BLOOD PRESSURE: 85 MMHG | HEIGHT: 59 IN | BODY MASS INDEX: 20.88 KG/M2

## 2017-07-21 DIAGNOSIS — D47.3 ESSENTIAL THROMBOCYTHEMIA (H): ICD-10-CM

## 2017-07-21 PROCEDURE — T1013 SIGN LANG/ORAL INTERPRETER: HCPCS | Mod: U3,ZF

## 2017-07-21 PROCEDURE — 99213 OFFICE O/P EST LOW 20 MIN: CPT | Mod: ZP | Performed by: INTERNAL MEDICINE

## 2017-07-21 PROCEDURE — 99212 OFFICE O/P EST SF 10 MIN: CPT | Mod: ZF

## 2017-07-21 ASSESSMENT — PAIN SCALES - GENERAL: PAINLEVEL: NO PAIN (0)

## 2017-07-21 NOTE — NURSING NOTE
"Oncology Rooming Note    July 21, 2017 10:14 AM   Pavithra Wing is a 22 year old female who presents for:    Chief Complaint   Patient presents with     Oncology Clinic Visit     Thrombocytosis F/U     Initial Vitals: LMP 01/20/2017 Estimated body mass index is 21.76 kg/(m^2) as calculated from the following:    Height as of 3/29/17: 1.486 m (4' 10.5\").    Weight as of 4/26/17: 48 kg (105 lb 14.4 oz). There is no height or weight on file to calculate BSA.  No Pain (0) Comment: Data Unavailable   Patient's last menstrual period was 01/20/2017.  Allergies reviewed: Yes  Medications reviewed: Yes    Medications: Medication refills not needed today.  Pharmacy name entered into [facility]:    [id number] - [place], [place] - [address] [facility] [address] [place] [facility] - [place], [place] - [address]    Clinical concerns: None Dr Myrick was NOT notified.    7 minutes for nursing intake (face to face time)     Edwige Hopper LPN              "

## 2017-07-21 NOTE — LETTER
2017       RE: Pavithra Wing  [address] [place] [facility] [address]     Dear Colleague,    Thank you for referring your patient, Pavithra Wing, to the [facility]. Please see a copy of my visit note below.        [facility] [place] Hematology Follow Up Visit    Outpatient Visit Note:    Patient: Pavithra Wing  MRN: [id number]  : 1995  DERRELL: 2017    Pavithra Wing is a 22 year old woman with a history of severe cognitive impairment, blindness with CALR+, very low risk, ET on observation alone, who returns for routine follow up.      Forward History:  2017     - Thrombocytosis noted after hysterectomy for menorrhagia.  No significant postop bleeding.    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  Dad is with her today who provides all the history.  He reports she is doing well.  She has had no bleeding or leg pain or swelling.  He says her appetite is good and weight is stable.    Medications:  Current Outpatient Prescriptions   Medication Sig Dispense Refill     ALPRAZolam (XANAX) 0.5 MG tablet Take 1 tablet (0.5 mg) by mouth as needed for anxiety (for anxiety with blood draws) 10 tablet 0     acetaminophen (TYLENOL) 325 MG tablet Take 2 tablets (650 mg) by mouth every 4 hours as needed for mild pain 100 tablet 0     senna-docusate (SENOKOT-S;PERICOLACE) 8.6-50 MG per tablet Take 1 tablet by mouth 2 times daily as needed for constipation 60 tablet 0     order for DME Equipment being ordered: orthotics 1 Device 0     Fiber, Guar Gum, CHEW Take 2.5 g by mouth 2 times daily       FEXOFENADINE HCL PO Take 180 mg by mouth daily        MULTI-VITAMIN OR TABS 1 DAILY          Allergies:  Allergies   Allergen Reactions     Benzalkonium Chloride Rash       ROS:  A 14 point ROS is negative  except as stated in the HPI    Objective:  Vitals: B/P: 144/85, T: 97.6, P: 111, R: 16, Wt: 103 lbs 9.6 oz  Exam:   Gen: Appears well, no distress.  She makes poor eye contact and does not communicate  HEENT: no scleral icterus or hemorrhage, no wet purpura, no lymphadenopathy    Labs:  Results for DAYTON WING (MRN [id number]) as of 7/21/2017 11:06   Ref. Range 3/8/2017 11:45 4/1/2017 08:21   WBC Latest Ref Range: 4.0 - 11.0 10e9/L 12.7 (H) 11.8 (H)   Hemoglobin Latest Ref Range: 11.7 - 15.7 g/dL 13.4 13.0   Hematocrit Latest Ref Range: 35.0 - 47.0 % 41.7 41.9   Platelet Count Latest Ref Range: 150 - 450 10e9/L 1744 (HH) 1651 ()       Imaging:  none    Assessment:  In summary, Dayton Wing is a 22 year old woman with a history of severe cognitive impairment, blindness with CALR+ ET (very low risk) who returns for routine follow up.   She has no symptoms suggestive of a need for therapy so I think continued observation is the best option for her.    Plan:  1. Majority of today's visit was spent counseling the patient regarding natural history of this disease.  2. Reviewed symptoms of DVT, bleeding (acquired vWD) or symptoms of progression to another myeloid disorder.  3. Return in April 2018 with a CBC and call sooner if she develops concerning symptoms (provided written instructions about the potential complications from ET).    The patient is given our center's contact information and is instructed to call if she should have any further questions or concerns.      Total Time Spent:  I spent a total of 15 minutes face-to-face with Dayton Wing during today's office visit.  Over 50% of this time was spent counseling the patient and/or coordinating care regarding ET.      Taurus Myrick MD   of Medicine  [facility] [employer] [facility] [employer]

## 2017-07-21 NOTE — PROGRESS NOTES
[facility] Hematology Follow Up Visit    Outpatient Visit Note:    Patient: Pavithra Wing  MRN: [id number]  : 1995  DERRELL: 2017    Pavithra Wing is a 22 year old woman with a history of severe cognitive impairment, blindness with CALR+, very low risk, ET on observation alone, who returns for routine follow up.      Forward History:  2017     - Thrombocytosis noted after hysterectomy for menorrhagia.  No significant postop bleeding.    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  Dad is with her today who provides all the history.  He reports she is doing well.  She has had no bleeding or leg pain or swelling.  He says her appetite is good and weight is stable.    Medications:  Current Outpatient Prescriptions   Medication Sig Dispense Refill     ALPRAZolam (XANAX) 0.5 MG tablet Take 1 tablet (0.5 mg) by mouth as needed for anxiety (for anxiety with blood draws) 10 tablet 0     acetaminophen (TYLENOL) 325 MG tablet Take 2 tablets (650 mg) by mouth every 4 hours as needed for mild pain 100 tablet 0     senna-docusate (SENOKOT-S;PERICOLACE) 8.6-50 MG per tablet Take 1 tablet by mouth 2 times daily as needed for constipation 60 tablet 0     order for DME Equipment being ordered: orthotics 1 Device 0     Fiber, Guar Gum, CHEW Take 2.5 g by mouth 2 times daily       FEXOFENADINE HCL PO Take 180 mg by mouth daily        MULTI-VITAMIN OR TABS 1 DAILY          Allergies:  Allergies   Allergen Reactions     Benzalkonium Chloride Rash       ROS:  A 14 point ROS is negative except as stated in the HPI    Objective:  Vitals: B/P: 144/85, T: 97.6, P: 111, R: 16, Wt: 103 lbs 9.6 oz  Exam:   Gen: Appears well, no distress.  She makes poor eye contact and does not communicate  HEENT: no scleral icterus or hemorrhage, no wet purpura, no  lymphadenopathy    Labs:  Results for DAYTON WING (MRN [id number]) as of 7/21/2017 11:06   Ref. Range 3/8/2017 11:45 4/1/2017 08:21   WBC Latest Ref Range: 4.0 - 11.0 10e9/L 12.7 (H) 11.8 (H)   Hemoglobin Latest Ref Range: 11.7 - 15.7 g/dL 13.4 13.0   Hematocrit Latest Ref Range: 35.0 - 47.0 % 41.7 41.9   Platelet Count Latest Ref Range: 150 - 450 10e9/L 1744 (HH) 1651 (HH)       Imaging:  none    Assessment:  In summary, Dayton Wing is a 22 year old woman with a history of severe cognitive impairment, blindness with CALR+ ET (very low risk) who returns for routine follow up.  She has no symptoms suggestive of a need for therapy so I think continued observation is the best option for her.    Plan:  1. Majority of today's visit was spent counseling the patient regarding natural history of this disease.  2. Reviewed symptoms of DVT, bleeding (acquired vWD) or symptoms of progression to another myeloid disorder.  3. Return in April 2018 with a CBC and call sooner if she develops concerning symptoms (provided written instructions about the potential complications from ET).    The patient is given our center's contact information and is instructed to call if she should have any further questions or concerns.      Total Time Spent:  I spent a total of 15 minutes face-to-face with Dayton Wing during today's office visit.  Over 50% of this time was spent counseling the patient and/or coordinating care regarding ET.      Taurus Myrick MD   of Medicine  [facility] [employer]

## 2017-07-21 NOTE — PATIENT INSTRUCTIONS
I am glad to hear that Pavithra is doing well today.  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  Many patients with this disorder never need any treatment.  There are 3 common problems that can occur that we would want you to call us if they occurred:  1. Blood clots in the veins causing leg pain, swelling and redness  2. Bleeding - usually nose bleeds or gum bleeding  3. Overall not feeling well - this can include unexplained fevers, tiredness, loss of appetite, discomfort in the abdomen (related to a large spleen)    I would plan to do a blood test to monitor the disease once per year as long as she has no symptoms.  Please do not hesitate to call if you have questions or concerns and otherwise we will see her back in April 2018.    If you have questions or concerns before your next appointment you can call Dr Myrick's Care Coordinator, Sarah Hamilton at:  [phone]    You can also reach Dr Myrick through [employer] or at his office number at [phone]

## 2017-07-21 NOTE — MR AVS SNAPSHOT
After Visit Summary   7/21/2017    Pavithra Wing    MRN: [id number]           Patient Information     Date Of Birth          1995        Visit Information        Provider Department      7/21/2017 10:15 AM Claudia Hare; Taurus Myrick MD [facility]        Care Instructions    I am glad to hear that Pavithra is doing well today.  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  Many patients with this disorder never need any treatment.  There are 3 common problems that can occur that we would want you to call us if they occurred:  1. Blood clots in the veins causing leg pain, swelling and redness  2. Bleeding - usually nose bleeds or gum bleeding  3. Overall not feeling well - this can include unexplained fevers, tiredness, loss of appetite, discomfort in the abdomen (related to a large spleen)    I would plan to do a blood test to monitor the disease once per year as long as she has no symptoms.  Please do not hesitate to call if you have questions or concerns and otherwise we will see her back in April 2018.    If you have questions or concerns before your next appointment you can call Dr Myrick's Care Coordinator, Sarah Hamilton at:  [phone]    You can also reach Dr Myrick through [employer] or at his office number at [phone]            Follow-ups after your visit        Follow-up notes from your care team     Return in about 9 months (around 4/20/2018).      Your next 10 appointments already scheduled     Aug 07, 2017  9:30 AM CDT   [facility]    [address] [facility] [address] [phone]            Aug 14, 2017   Procedure with Alexa Moyer MD   [facility] ([facility])    [address]  5th Floor  [facility] [address] [phone]            "Located in the [facility] at [address] [place] [address].   parking is very convenient and highly recommended.  is a $6 flat rate fee.  Both  and self parkers should enter the main arrival plaza from [facility]; parking attendants will direct you based on your parking preference.            Mar 14, 2018 10:15 AM CDT   [facility] Lab Draw with  "[employer] LAB DRAW   [facility] ([facility])    [address] [place] [address] [phone]            Mar 21, 2018  3:00 PM CDT   (Arrive by 2:45 PM)   Return Visit with Taurus Myrick MD   [facility] ([facility])    [address] [place] [address] [phone]              Who to contact     If you have questions or need follow up information about today's clinic visit or your schedule please contact [facility] directly at [phone].  Normal or non-critical lab and imaging results will be communicated to you by [employer], letter or phone within 4 business days after the clinic has received the results. If you do not hear from us within 7 days, please contact the clinic through [employer] or phone. If you have a critical or abnormal lab result, we will notify you by phone as soon as possible.  Submit refill requests through [employer] or call your pharmacy and they will forward the refill request to us. Please allow 3 business days for your refill to be completed.          Additional Information About Your Visit        [employer] Information     [employer] lets you send messages to your doctor, view your test results, renew your prescriptions, schedule appointments and more. To sign up, go to [email or web address] . Click on \"Log in\" on the left side of the screen, which will take you to the Welcome page. Then click on \"Sign up Now\" on the right side of the page.     You will " "be asked to enter the access code listed below, as well as some personal information. Please follow the directions to create your username and password.     Your access code is: [email or web address]  Expires: 10/5/2017  6:30 AM     Your access code will  in 90 days. If you need help or a new code, please call your [facility] or [phone].        Care EveryWhere ID     This is your Care EveryWhere ID. This could be used by other organizations to access your [facility] medical records  [id number]        Your Vitals Were     Pulse Temperature Respirations Height Last Period Pulse Oximetry    111 97.6  F (36.4  C) (Axillary) 16 1.486 m (4' 10.5\") 2017 98%    BMI (Body Mass Index)                   21.28 kg/m2            Blood Pressure from Last 3 Encounters:   17 144/85   17 (!) 143/91   17 131/80    Weight from Last 3 Encounters:   17 47 kg (103 lb 9.6 oz)   17 48 kg (105 lb 14.4 oz)   17 47.8 kg (105 lb 6.4 oz)              Today, you had the following     No orders found for display       Primary Care Provider Office Phone # Fax #    Ngozi SEB Yepez [facility] [phone]       [facility] [address] [facility] [address]        Equal Access to Services     RUDDY GALVIN : Hadii aad ku hadasho Sonabor, waaxda luqadaha, qaybta kaalmada adeegyada, mari valentin. So [facility] [phone].    ATENCIÓN: Si habla jimañol, tiene a doe disposición servicios gratuitos de asistencia lingüística. Llame al [phone].    We comply with applicable federal civil rights laws and [place] laws. We do not discriminate on the basis of race, color, national origin, age, disability sex, sexual orientation or gender identity.            Thank you!     Thank you for choosing [facility]  for your care. Our goal is always to provide you with excellent care. Hearing back from our patients is one way we can " continue to improve our services. Please take a few minutes to complete the written survey that you may receive in the mail after your visit with us. Thank you!             Your Updated Medication List - Protect others around you: Learn how to safely use, store and throw away your medicines at www.disposemymeds.org.          This list is accurate as of: 7/21/17 11:07 AM.  Always use your most recent med list.                   Brand Name Dispense Instructions for use Diagnosis    acetaminophen 325 MG tablet    TYLENOL    100 tablet    Take 2 tablets (650 mg) by mouth every 4 hours as needed for mild pain    S/P hysterectomy       ALPRAZolam 0.5 MG tablet    XANAX    10 tablet    Take 1 tablet (0.5 mg) by mouth as needed for anxiety (for anxiety with blood draws)    Thrombocytosis (H)       FEXOFENADINE HCL PO      Take 180 mg by mouth daily        Fiber (Guar Gum) Chew      Take 2.5 g by mouth 2 times daily        Multi-vitamin Tabs tablet   Generic drug:  multivitamin, therapeutic with minerals      1 DAILY        order for DME     1 Device    Equipment being ordered: orthotics    Development delay       senna-docusate 8.6-50 MG per tablet    SENOKOT-S;PERICOLACE    60 tablet    Take 1 tablet by mouth 2 times daily as needed for constipation    S/P hysterectomy

## 2017-07-25 ENCOUNTER — CARE COORDINATION (OUTPATIENT)
Dept: OTOLARYNGOLOGY | Facility: CLINIC | Age: 22
End: 2017-07-25

## 2017-07-25 NOTE — PROGRESS NOTES
Tried contacting parents via phone/tty for teaching, Dad return call  leaving message stating preferred  email.  Sent the below email:     To the parents/guardian of Pavithra,    I am the nurse that works with Dr. Moyer.   Pavithra surgery is 8-14-17.  The surgery scheduler has sent you a packet. Please review and let me know if you have any questions.  Please make sure the pre op history and physical is completed. Looks like this is schedule for 8-7-17.  A nurse from the surgery area will be contacting you regarding instruction before the surgery.- like the eating and drinking restrictions.  Dr. Moyer will be examining and cleaning the ears while Pavithra is under anesthesia.  After the procedure she will go to the recovery area for about an hour, once she is stable you will be able to take her home. She maybe alittle sleepy for the rest of the day, resume her diet slowly- she maybe alittle nauseated.  I know she has had this done before, you are probably familiar with how she handles the recovery phase. If you have any questions please let me know.    It is best to communicate with our ENT staff via the My Chart. We have sent you a email regarding this.    Thanks,  Jacy Vera RN

## 2017-08-01 NOTE — PATIENT INSTRUCTIONS
[facility]- Pediatric Department    If you have any questions regarding to your visit please contact:   Team Leonor:   Clinic Hours Telephone Number   SEB Carroll, SAUNDRA Khan PA-C, MARIA INES Vizcarra,    7am - 7pm Mon - Thurs  7am - 5pm Fri [phone]    After hours and weekends, call [phone]   To make an appointment at any location anytime, please call [phone] or  [email or web address].   Pediatric Walk-in Clinic* 8:30am - 3pm  Mon- Fri    [facility]   8:00am - 7pm  Mon- Thurs  8:00am - 5:30 pm Friday  9am - 1pm Saturday [phone]   Urgent Care - [place]      Urgent Care - [place]       11pm-9pm Monday - Friday   9am-5pm Saturday - Sunday    5pm-9pm Monday - Friday  9am-5pm Saturday - Sunday [phone] - [place]      [phone] - [place]   *Pediatric Walk-In Clinic is available for children/adolescents age 0-21 for the following symptoms:  Cough/Cold symptoms   Rashes/Itchy Skin  Sore throat    Urinary tract infection  Diarrhea    Ringworm  Ear pain    Sinus infection  Fever     Pink eye       If your provider has ordered a CT, MRI, or ultrasound for you, please call to schedule:  [facility], phone [phone], fax [phone]  [facility] radiology, [phone]    If you need a medication refill please contact your pharmacy.   Please allow 3 business days for your refills to be completed.  **For ADHD medication, patient will need a follow up clinic or Evisit at least every 3 months to obtain refills.**    Use FAGUO (secure email communication and access to your chart) to send your primary care provider a message or make an appointment.  Ask someone on your Team how to sign up for FAGUO or call the FAGUO help line at [phone]  To view your child's test results online: Log into your own FAGUO account, select your  "child's name from the tabs on the right hand side, select \"My medical record\" and select \"Test results\"  Do you have options for a visit without coming into the clinic?  [facility] offers electronic visits (E-visits) and telephone visits for certain medical concerns as well as Zipnosis online.    E-visits via "InvierteMe,SL"- generally incur a $35.00 fee.   Telephone visits- These are billed based on time spent (in 10-minute increments) on the phone with your provider.   5-10 minutes $30.00 fee   11-20 minutes $59.00 fee   21-30 minutes $85.00 fee  Zipnosis- $25.00 fee.  More information and link available on [employer] homepage.       Before Your Surgery      Call your surgeon if there is any change in your health. This includes signs of a cold or flu (such as a sore throat, runny nose, cough, rash or fever).    Do not smoke, drink alcohol or take over the counter medicine (unless your surgeon or primary care doctor tells you to) for the 24 hours before and after surgery.    If you take prescribed drugs: Follow your doctor s orders about which medicines to take and which to stop until after surgery.    Eating and drinking prior to surgery: follow the instructions from your surgeon    Take a shower or bath the night before surgery. Use the soap your surgeon gave you to gently clean your skin. If you do not have soap from your surgeon, use your regular soap. Do not shave or scrub the surgery site.  Wear clean pajamas and have clean sheets on your bed.     Will talk further about transferring care over the next year to Dr. Romi Perez.    "

## 2017-08-01 NOTE — PROGRESS NOTES
[facility]  [address] [facility] [address] [phone]  Dept: [phone]    PRE-OP EVALUATION:  Pavithra Wing is a 22 year old female, here for a pre-operative evaluation, accompanied by her father and     Today's date: 8/7/2017  Proposed procedure: Bilateral Ear Exam and Cleaning Under Anesthesia  Date of Surgery/ Procedure: 08/14/2017  Hospital/Surgical Facility: [facility]  Surgeon/ Procedure Provider: Alexa Moyer MD  This report is available electronically  Primary Physician: Ngozi Randall  Type of Anesthesia Anticipated: General      HPI:                                                    1. No - Has your child had any illness, including a cold, cough, shortness of breath or wheezing in the last week?  2. No - Has there been any use of ibuprofen or aspirin within the last 7 days?  3. No - Does your child use herbal medications?   4. No - Has your child ever had wheezing or asthma?  5. No - Does your child use supplemental oxygen or a C-PAP machine?   6. No - Has your child ever had anesthesia or been put under for a procedure?  7. YES - HAS YOUR CHILD OR ANYONE IN YOUR FAMILY EVER HAD PROBLEMS WITH ANESTHESIA? With ear surgeries has had vomiting  8. No - Does your child or anyone in your family have a serious bleeding problem or easy bruising?      ==================    Reason for Procedure: ear cleaning and EUA  Brief HPI related to upcoming procedure:  hisotry of ear problems and masotidiits, due for a EUA and ear cleaing her last on ewas 12/15 so almost 2 years ago.   Since the first of the year has had a  hysterectomy and was diagnosed with essential thrombocythemia. This should not interfere with her cpcoming surgery.    Medical History:                                                      PROBLEM LIST  Patient Active Problem List    Diagnosis Date Noted     Essential thrombocythemia (H)  05/04/2017     Priority: Medium     CALR mutation positive, low risk disease for thrombosis.  Observation alone       S/P hysterectomy 01/20/2017     Priority: Medium     Hyperlipidemia with target LDL less than 160 08/12/2014     Priority: Medium     Diagnosis updated by automated process. Provider to review and confirm.       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 Bertha Queen,     3/5/12 - Left message for parent will mail [facility] packet to parent, instructed to fill out care plan. Will follow up in 2 weeks.Donna BrownRN  DX V65.8 REPLACED WITH [id number] HEALTH CARE HOME (04/08/2013)       Lactose intolerance 03/14/2011     Priority: Medium     diarrhea with milk       Heavy menstrual period 03/14/2011     Priority: Medium     Irregular menstrual bleeding 03/14/2011     Priority: Medium     Development delay: functional level of 2-2yo 06/17/2009     Priority: Medium     Blindness 06/17/2009     Priority: Medium     Impaired hearing 06/17/2009     Priority: Medium     Mastoiditis 06/17/2009     Priority: Medium     Constipation 06/17/2009     Priority: Medium       SURGICAL HISTORY  Past Surgical History:   Procedure Laterality Date     ADENOIDECTOMY  4/24/2014    Procedure: ADENOIDECTOMY;  Surgeon: Omar Quiroz MD;  [facility]     C ANESTH,EYE EXAM      Retinopathy of prematurity     EXAM UNDER ANESTHESIA EAR(S)  4/27/2012    Procedure:EXAM UNDER ANESTHESIA EAR(S); Surgeon:OMAR QUIROZ; [facility]     EXAM UNDER ANESTHESIA EAR(S)  4/24/2014    Procedure: EXAM UNDER ANESTHESIA EAR(S);  Surgeon: Omar Quiroz MD;  [facility]     EXAM UNDER ANESTHESIA EAR(S) Bilateral 9/2/2015    Procedure: EXAM UNDER ANESTHESIA EAR(S);  Surgeon: Speedy Griffin MD;  [facility]     EXAM UNDER ANESTHESIA EYE(S)  4/24/2014    Procedure: EXAM UNDER  ANESTHESIA EYE(S);  Surgeon: Romi Mary MD;  [facility]     EXAM UNDER ANESTHESIA NOSE  4/24/2014    Procedure: EXAM UNDER ANESTHESIA NOSE;  Surgeon: Omar Quiroz MD;  [facility]     EXAM UNDER ANESTHESIA, RESTORATIONS, EXTRACTION(S) DENTAL COMPLEX, COMBINED  3/29/2012    Procedure:COMBINED EXAM UNDER ANESTHESIA, RESTORATIONS, EXTRACTION(S) DENTAL COMPLEX; Dental Exam, Radiograph, Dental Restorations, Periodontal Therapy, right ear mold impression.; Surgeon:PRITI VILLEGAS; [facility]     EXAM UNDER ANESTHESIA, RESTORATIONS, EXTRACTION(S) DENTAL, COMBINED  4/24/2014    Procedure: COMBINED EXAM UNDER ANESTHESIA, RESTORATIONS, EXTRACTION(S) DENTAL;  Surgeon: Yun Flores DDS;  [facility]     EXAM UNDER ANESTHESIA, RESTORATIONS, EXTRACTION(S) DENTAL, COMBINED N/A 9/2/2015    Procedure: COMBINED EXAM UNDER ANESTHESIA, RESTORATIONS, EXTRACTION(S) DENTAL;  Surgeon: Priti Villegas DDS;  [facility]     IMPLANT SOPHONO DEVICE  4/27/2012    Procedure:IMPLANT SOPHONO DEVICE; Left Sophono Implant   ; Surgeon:OMAR QUIROZ; [facility]     LAPAROSCOPIC HYSTERECTOMY TOTAL, SALPINGECTOMY BILATERAL Bilateral 1/20/2017    Procedure: LAPAROSCOPIC HYSTERECTOMY TOTAL, SALPINGECTOMY BILATERAL;  Surgeon: Rosy Ryan MD;  [facility]     PE TUBES       PICC INSERTION      removed 2/25/09       MEDICATIONS  Current Outpatient Prescriptions   Medication Sig Dispense Refill     ALPRAZolam (XANAX) 0.5 MG tablet Take 1 tablet (0.5 mg) by mouth as needed for anxiety (for anxiety with blood draws) 10 tablet 0     acetaminophen (TYLENOL) 325 MG tablet Take 2 tablets (650 mg) by mouth every 4 hours as needed for mild pain 100 tablet 0     senna-docusate (SENOKOT-S;PERICOLACE) 8.6-50 MG per tablet Take 1 tablet by mouth 2 times daily as needed for constipation 60 tablet 0     order for DME Equipment being ordered: orthotics 1 Device 0     Fiber, Guar Gum, CHEW Take 2.5 g by  mouth 2 times daily       FEXOFENADINE HCL PO Take 180 mg by mouth daily        MULTI-VITAMIN OR TABS 1 DAILY         ALLERGIES  Allergies   Allergen Reactions     Benzalkonium Chloride Rash        Review of Systems:                                                    GENERAL: Fever - no; Poor appetite - no; Sleep disruption - no  SKIN: Rash - No; Hives - No; Eczema - No;  EYE: Pain - No; Discharge - No; Redness - No; Itching - No; Vision Problems - No;  ENT: Ear Pain - No; Runny nose - No; Congestion - No; Sore Throat - No;  RESP: Cough - No; Wheezing - No; Difficulty Breathing - No;  GI: Vomiting - No; Diarrhea - No; Abdominal Pain - No; Constipation - No;  NEURO: Headache - No; Weakness - No;        Physical Exam:                                                    /83  Pulse 117  Temp 98.6  F (37  C) (Oral)  Wt 102 lb (46.3 kg)  LMP 01/20/2017  SpO2 99%  BMI 20.95 kg/m2  Normalized stature-for-age data not available for patients older than 20 years.  Normalized weight-for-age data not available for patients older than 20 years.  Normalized BMI data available only for age 0 to 20 years.  Normalized stature-for-age data not available for patients older than 20 years.  GENERAL: Active, alert, in no acute distress.  SKIN: Clear. No significant rash, abnormal pigmentation or lesions  HEAD: Normocephalic.  EYES: no examination  RIGHT EAR: TM occluded with wax  LEFT EAR: TM occluded with wax  NOSE: Normal without discharge.  MOUTH/THROAT: Clear. No oral lesions. Teeth intact without obvious abnormalities.  LUNGS: Clear. No rales, rhonchi, wheezing or retractions  HEART: Regular rhythm. Normal S1/S2. No murmurs. Normal pulses.  ABDOMEN: Soft, non-tender, not distended, no masses or hepatosplenomegaly. Bowel sounds normal.       Diagnostics:                                                    None indicated     Assessment/Plan:                                                    Pavithra Wing is a 22  year old female, presenting for:  (Z01.818) Preop general physical exam  (primary encounter diagnosis)  (H61.23) Bilateral impacted cerumen  (D47.3) Essential thrombocythemia (H)  Comment:   Plan:   OK for surgery    Airway/Pulmonary Risk: None identified  Cardiac Risk: None identified  Hematology/Coagulation Risk: None identified  Metabolic Risk: None identified  Pain/Comfort Risk: None identified     Approval given to proceed with proposed procedure, without further diagnostic evaluation    Copy of this evaluation report is provided to requesting physician.    ____________________________________  August 7, 2017    Signed Electronically by: Ngozi Randall, PNP, APRN CNP  Send to [facility] [place] [phone]    [facility]  [address] [facility] [address]  Phone: [phone]

## 2017-08-07 ENCOUNTER — OFFICE VISIT (OUTPATIENT)
Dept: PEDIATRICS | Facility: CLINIC | Age: 22
End: 2017-08-07
Payer: COMMERCIAL

## 2017-08-07 VITALS
DIASTOLIC BLOOD PRESSURE: 83 MMHG | WEIGHT: 102 LBS | SYSTOLIC BLOOD PRESSURE: 138 MMHG | HEART RATE: 117 BPM | OXYGEN SATURATION: 99 % | BODY MASS INDEX: 20.95 KG/M2 | TEMPERATURE: 98.6 F

## 2017-08-07 DIAGNOSIS — Z01.818 PREOP GENERAL PHYSICAL EXAM: Primary | ICD-10-CM

## 2017-08-07 DIAGNOSIS — H61.23 BILATERAL IMPACTED CERUMEN: ICD-10-CM

## 2017-08-07 DIAGNOSIS — D47.3 ESSENTIAL THROMBOCYTHEMIA (H): ICD-10-CM

## 2017-08-07 PROCEDURE — 99214 OFFICE O/P EST MOD 30 MIN: CPT | Performed by: NURSE PRACTITIONER

## 2017-08-07 PROCEDURE — T1013 SIGN LANG/ORAL INTERPRETER: HCPCS | Mod: U3

## 2017-08-07 NOTE — MR AVS SNAPSHOT
After Visit Summary   8/7/2017    Pavithra Wing    MRN: [id number]           Patient Information     Date Of Birth          1995        Visit Information        Provider Department      8/7/2017 9:30 AM Claudia Hare Nancy Novakoske, APRN [facility]        Today's Diagnoses     Preop general physical exam    -  1    Bilateral impacted cerumen        Essential thrombocythemia (H)          Care Instructions    [facility]- Pediatric Department    If you have any questions regarding to your visit please contact:   Team Leonor:   Clinic Hours Telephone Number   SEB Carroll, CPNP  Willow Khan PA-C, MARIA INES Vizcarra,    7am - 7pm Mon - Thurs  7am - 5pm Fri [phone]    After hours and weekends, call [phone]   To make an appointment at any location anytime, please call [phone] or  [email or web address].   Pediatric Walk-in Clinic* 8:30am - 3pm  Mon- Fri    [facility]   8:00am - 7pm  Mon- Thurs  8:00am - 5:30 pm Friday  9am - 1pm Saturday [phone]   Urgent Care - [place]      Urgent Care - [place]       11pm-9pm Monday - Friday   9am-5pm Saturday - Sunday    5pm-9pm Monday - Friday  9am-5pm Saturday - Sunday [phone] - [place]      [phone] - [place]   *Pediatric Walk-In Clinic is available for children/adolescents age 0-21 for the following symptoms:  Cough/Cold symptoms   Rashes/Itchy Skin  Sore throat    Urinary tract infection  Diarrhea    Ringworm  Ear pain    Sinus infection  Fever     Pink eye       If your provider has ordered a CT, MRI, or ultrasound for you, please call to schedule:  Mata tyler, phone [phone], fax [phone]  [facility] radiology, [phone]    If you need a medication refill please contact your pharmacy.   Please allow 3 business days  "for your refills to be completed.  **For ADHD medication, patient will need a follow up clinic or Evisit at least every 3 months to obtain refills.**    Use [employer] (secure email communication and access to your chart) to send your primary care provider a message or make an appointment.  Ask someone on your Team how to sign up for [employer] or call the [employer] help line at [phone]  To view your child's test results online: Log into your own [employer] account, select your child's name from the tabs on the right hand side, select \"My medical record\" and select \"Test results\"  Do you have options for a visit without coming into the clinic?  [employer] offers electronic visits (E-visits) and telephone visits for certain medical concerns as well as Zipnosis online.    E-visits via [employer]- generally incur a $35.00 fee.   Telephone visits- These are billed based on time spent (in 10-minute increments) on the phone with your provider.   5-10 minutes $30.00 fee   11-20 minutes $59.00 fee   21-30 minutes $85.00 fee  Zipnosis- $25.00 fee.  More information and link available on [employer] homepage.       Before Your Surgery      Call your surgeon if there is any change in your health. This includes signs of a cold or flu (such as a sore throat, runny nose, cough, rash or fever).    Do not smoke, drink alcohol or take over the counter medicine (unless your surgeon or primary care doctor tells you to) for the 24 hours before and after surgery.    If you take prescribed drugs: Follow your doctor s orders about which medicines to take and which to stop until after surgery.    Eating and drinking prior to surgery: follow the instructions from your surgeon    Take a shower or bath the night before surgery. Use the soap your surgeon gave you to gently clean your skin. If you do not have soap from your surgeon, use your regular soap. Do not shave or scrub the surgery site.  Wear clean pajamas and have clean sheets on your bed. "     Will talk further about transferring care over the next year to Dr. Romi Perez.            Follow-ups after your visit        Your next 10 appointments already scheduled     Aug 14, 2017   Procedure with Alexa Moyer MD   [facility] ([facility])    [address] [place] [address]  5th Floor  [facility] [address]   [phone]           Located in the Clinics and Surgery Center at [address] [place] [address].   parking is very convenient and highly recommended.  is a $6 flat rate fee.  Both  and self parkers should enter the main arrival plaza from [facility]; parking attendants will direct you based on your parking preference.            Mar 14, 2018 10:15 AM CDT   [facility] Lab Draw with  [facility] LAB DRAW   [facility]    [address] [place] [address]  [address] [facility] [address] [phone]            Mar 21, 2018  3:00 PM CDT   (Arrive by 2:45 PM)   Return Visit with Taurus Myrick MD   [facility] ([facility])    [address] [place] [address]  [address] [facility] [address] [phone]              Who to contact     If you have questions or need follow up information about today's clinic visit or your schedule please contact [facility] directly at [phone].  Normal or non-critical lab and imaging results will be communicated to you by MyChart, letter or phone within 4 business days after the clinic has received the results. If you do not hear from us within 7 days, please contact the clinic through MyChart or phone. If you have a critical or abnormal lab result, we will notify you by phone as soon as possible.  Submit refill requests through [employer] or call your pharmacy and they will forward the refill request to us. Please allow 3 business days for your refill to be completed.          Additional  "Information About Your Visit        MyChart Information     [email or web address] lets you send messages to your doctor, view your test results, renew your prescriptions, schedule appointments and more. To sign up, go to [email or web address] . Click on \"Log in\" on the left side of the screen, which will take you to the Welcome page. Then click on \"Sign up Now\" on the right side of the page.     You will be asked to enter the access code listed below, as well as some personal information. Please follow the directions to create your username and password.     Your access code is: [email or web address]  Expires: 10/5/2017  6:30 AM     Your access code will  in 90 days. If you need help or a new code, please call your [place] clinic or [phone].        Care EveryWhere ID     This is your Care EveryWhere ID. This could be used by other organizations to access your [place] medical records  [id number]        Your Vitals Were     Pulse Temperature Last Period Pulse Oximetry BMI (Body Mass Index)       117 98.6  F (37  C) (Oral) 2017 99% 20.95 kg/m2        Blood Pressure from Last 3 Encounters:   17 138/83   17 144/85   17 (!) 143/91    Weight from Last 3 Encounters:   17 102 lb (46.3 kg)   17 103 lb 9.6 oz (47 kg)   17 105 lb 14.4 oz (48 kg)              Today, you had the following     No orders found for display       Primary Care Provider Office Phone # Fax #    Ngozi Agueda Randall, SEB [facility] [phone]       [facility] [address] [facility] [address]        Equal Access to Services     RUDDY GALVIN : Topher Brandon, lan herrera, angela kaalmada aminah, mari valentin. So [facility] [phone].    ATENCIÓN: Si habla español, tiene a doe disposición servicios gratuitos de asistencia lingüística. Llame al [phone].    We comply with applicable federal civil rights laws and [place] laws. We do not " discriminate on the basis of race, color, national origin, age, disability sex, sexual orientation or gender identity.            Thank you!     Thank you for choosing [facility]  for your care. Our goal is always to provide you with excellent care. Hearing back from our patients is one way we can continue to improve our services. Please take a few minutes to complete the written survey that you may receive in the mail after your visit with us. Thank you!             Your Updated Medication List - Protect others around you: Learn how to safely use, store and throw away your medicines at www.disposemymeds.org.          This list is accurate as of: 8/7/17 10:34 AM.  Always use your most recent med list.                   Brand Name Dispense Instructions for use Diagnosis    acetaminophen 325 MG tablet    TYLENOL    100 tablet    Take 2 tablets (650 mg) by mouth every 4 hours as needed for mild pain    S/P hysterectomy       ALPRAZolam 0.5 MG tablet    XANAX    10 tablet    Take 1 tablet (0.5 mg) by mouth as needed for anxiety (for anxiety with blood draws)    Thrombocytosis (H)       FEXOFENADINE HCL PO      Take 180 mg by mouth daily        Fiber (Guar Gum) Chew      Take 2.5 g by mouth 2 times daily        Multi-vitamin Tabs tablet   Generic drug:  multivitamin, therapeutic with minerals      1 DAILY        order for DME     1 Device    Equipment being ordered: orthotics    Development delay       senna-docusate 8.6-50 MG per tablet    SENOKOT-S;PERICOLACE    60 tablet    Take 1 tablet by mouth 2 times daily as needed for constipation    S/P hysterectomy

## 2017-08-07 NOTE — NURSING NOTE
"Chief Complaint   Patient presents with     Pre-Op Exam       Initial /83  Pulse 117  Temp 98.6  F (37  C) (Oral)  Wt 102 lb (46.3 kg)  LMP 01/20/2017  SpO2 99%  BMI 20.95 kg/m2 Estimated body mass index is 20.95 kg/(m^2) as calculated from the following:    Height as of 7/21/17: 4' 10.5\" (1.486 m).    Weight as of this encounter: 102 lb (46.3 kg).  Medication Reconciliation: complete    Laura Salinas MA  "

## 2017-08-11 ENCOUNTER — ANESTHESIA EVENT (OUTPATIENT)
Dept: SURGERY | Facility: AMBULATORY SURGERY CENTER | Age: 22
End: 2017-08-11

## 2017-08-14 ENCOUNTER — SURGERY (OUTPATIENT)
Age: 22
End: 2017-08-14

## 2017-08-14 ENCOUNTER — ANESTHESIA (OUTPATIENT)
Dept: SURGERY | Facility: AMBULATORY SURGERY CENTER | Age: 22
End: 2017-08-14

## 2017-08-14 ENCOUNTER — OFFICE VISIT (OUTPATIENT)
Dept: INTERPRETER SERVICES | Facility: CLINIC | Age: 22
End: 2017-08-14

## 2017-08-14 ENCOUNTER — HOSPITAL ENCOUNTER (OUTPATIENT)
Facility: AMBULATORY SURGERY CENTER | Age: 22
End: 2017-08-14
Attending: OTOLARYNGOLOGY

## 2017-08-14 VITALS
DIASTOLIC BLOOD PRESSURE: 67 MMHG | BODY MASS INDEX: 20.56 KG/M2 | SYSTOLIC BLOOD PRESSURE: 121 MMHG | OXYGEN SATURATION: 99 % | HEIGHT: 59 IN | HEART RATE: 71 BPM | WEIGHT: 102 LBS | RESPIRATION RATE: 19 BRPM | TEMPERATURE: 97.6 F

## 2017-08-14 RX ORDER — DEXAMETHASONE SODIUM PHOSPHATE 10 MG/ML
10 INJECTION, SOLUTION INTRAMUSCULAR; INTRAVENOUS ONCE
Status: DISCONTINUED | OUTPATIENT
Start: 2017-08-14 | End: 2017-08-14 | Stop reason: HOSPADM

## 2017-08-14 RX ORDER — ONDANSETRON 2 MG/ML
4 INJECTION INTRAMUSCULAR; INTRAVENOUS EVERY 30 MIN PRN
Status: DISCONTINUED | OUTPATIENT
Start: 2017-08-14 | End: 2017-08-15 | Stop reason: HOSPADM

## 2017-08-14 RX ORDER — SODIUM CHLORIDE, SODIUM LACTATE, POTASSIUM CHLORIDE, CALCIUM CHLORIDE 600; 310; 30; 20 MG/100ML; MG/100ML; MG/100ML; MG/100ML
INJECTION, SOLUTION INTRAVENOUS CONTINUOUS
Status: DISCONTINUED | OUTPATIENT
Start: 2017-08-14 | End: 2017-08-14 | Stop reason: HOSPADM

## 2017-08-14 RX ORDER — FENTANYL CITRATE 50 UG/ML
25-50 INJECTION, SOLUTION INTRAMUSCULAR; INTRAVENOUS EVERY 5 MIN PRN
Status: DISCONTINUED | OUTPATIENT
Start: 2017-08-14 | End: 2017-08-14 | Stop reason: HOSPADM

## 2017-08-14 RX ORDER — SODIUM CHLORIDE, SODIUM LACTATE, POTASSIUM CHLORIDE, CALCIUM CHLORIDE 600; 310; 30; 20 MG/100ML; MG/100ML; MG/100ML; MG/100ML
INJECTION, SOLUTION INTRAVENOUS CONTINUOUS
Status: DISCONTINUED | OUTPATIENT
Start: 2017-08-14 | End: 2017-08-15 | Stop reason: HOSPADM

## 2017-08-14 RX ORDER — NALOXONE HYDROCHLORIDE 0.4 MG/ML
.1-.4 INJECTION, SOLUTION INTRAMUSCULAR; INTRAVENOUS; SUBCUTANEOUS
Status: DISCONTINUED | OUTPATIENT
Start: 2017-08-14 | End: 2017-08-15 | Stop reason: HOSPADM

## 2017-08-14 RX ORDER — LIDOCAINE 40 MG/G
CREAM TOPICAL
Status: DISCONTINUED | OUTPATIENT
Start: 2017-08-14 | End: 2017-08-14 | Stop reason: HOSPADM

## 2017-08-14 RX ORDER — ONDANSETRON 4 MG/1
4 TABLET, ORALLY DISINTEGRATING ORAL EVERY 30 MIN PRN
Status: DISCONTINUED | OUTPATIENT
Start: 2017-08-14 | End: 2017-08-15 | Stop reason: HOSPADM

## 2017-08-14 RX ORDER — ACETAMINOPHEN 325 MG/1
975 TABLET ORAL ONCE
Status: DISCONTINUED | OUTPATIENT
Start: 2017-08-14 | End: 2017-08-14 | Stop reason: HOSPADM

## 2017-08-14 RX ORDER — GABAPENTIN 300 MG/1
300 CAPSULE ORAL ONCE
Status: DISCONTINUED | OUTPATIENT
Start: 2017-08-14 | End: 2017-08-14 | Stop reason: HOSPADM

## 2017-08-14 RX ORDER — MEPERIDINE HYDROCHLORIDE 25 MG/ML
12.5 INJECTION INTRAMUSCULAR; INTRAVENOUS; SUBCUTANEOUS
Status: DISCONTINUED | OUTPATIENT
Start: 2017-08-14 | End: 2017-08-15 | Stop reason: HOSPADM

## 2017-08-14 RX ORDER — ACETAMINOPHEN 325 MG/1
650 TABLET ORAL
Status: DISCONTINUED | OUTPATIENT
Start: 2017-08-14 | End: 2017-08-15 | Stop reason: HOSPADM

## 2017-08-14 NOTE — ANESTHESIA CARE TRANSFER NOTE
Patient: Pathamon Somsumeetng Wing    Procedure(s):  Bilateral Ear Exam and Cerumenectomy under anesthesia - Wound Class: II-Clean Contaminated    Diagnosis: Ear Exam and Cleaning  Diagnosis Additional Information: No value filed.    Anesthesia Type:   MAC     Note:  Airway :Room Air  Patient transferred to:PACU  Comments: Arrive PACU, Stable, Airway Intact  111/72, 72,16,98%,97.6  All questions answered.        Vitals: (Last set prior to Anesthesia Care Transfer)    CRNA VITALS  8/14/2017 0708 - 8/14/2017 0740      8/14/2017             Pulse: 80    SpO2: 99 %    Resp Rate (observed): 24                Electronically Signed By: SEB Ann CRNA  August 14, 2017  7:40 AM

## 2017-08-14 NOTE — OP NOTE
August 14, 2017    Pre-op Diagnosis:  Same  Post-op Diagnosis:   Cerumen impaction  Procedure:   Bilateral ear cleaning, exam under anesthesia    Surgeons:   Verónica Edouard MD; Alexa Moyer MD  Anesthesia:  Mask with gas  EBL:  0 cc  Drains:   None      Complications:   None   Specimens:   Culture of left TM    Findings:  mucosalization of left TM, small mastoid cavity with very little cerumen. Right EAC with cerumen impaction removed with curette and suction.    Indications:  Ms. Wing is a 22 year old female with developmental disability and a left mastoid cavity. She does not tolerate ear cleaning in the office, so the decision was made to perform an exam under anesthesia.      Procedure:  After consent, the patient was brought to the operating room and placed in the supine position.  Following induction, the patient was mask ventilated throughout the case by a member of the anesthesia team. A timeout was performed to ensure correct patient and procedure. The left ear was brought into view under the otomicroscope. The left EAC and small mastoid cavity was inspected and a culture was taken of the TM as it appeared mucosalized and slightly wet. A small amount of crust was removed with a straight pick and curette. The rest of the canal appeared healthy. The right ear was similarly brought into view under the microscope and a speculum was used to view the EAC. Cerumen impaction was removed with suction and curette.   This concluded the procedure. The patient tolerated the procedure well and was brought to PACU in stable condition.    Verónica Edouard MD  PGY2

## 2017-08-14 NOTE — DISCHARGE INSTRUCTIONS
1.  Children's Tylenol as needed for pain.    2.  If you have any questions, please call the Pediatric ENT clinic during daytime hours or call the  and ask for the ENT resident on call during evening/weekend hours.    [facility]  Home Care Following Anesthesia  For 24 hours after surgery:  1. Get plenty of rest.  A responsible adult must stay with you for at least 24 hours after you leave the surgery center.  2. Do not drive or use heavy equipment.  If you have weakness or tingling, don't drive or use heavy equipment until this feeling goes away.   3. Do not drink alcohol.   4. Avoid strenuous or risky activities.  Ask for help when climbing stairs.  5. You may feel lightheaded.  IF so, sit for a few minutes before standing.  Have someone help you get up.   6. If you have nausea (feel sick to your stomach): Drink only clear liquids such as apple juice, ginger ale, broth or 7-Up.  Rest may also help.  Be sure to drink enough fluids.  Move to a regular diet as you feel able.   7. You may have a slight fever.  Call the doctor if your fever is over 100 F (37.7 C) (taken under the tongue) or lasts longer than 24 hours.  8. You may have a dry mouth, a sore throat, muscle aches or trouble sleeping. These should go away after 24 hours.  9. Do not make important or legal decisions.         Tips for taking pain medications  To get the best pain relief possible, remember these points:    Take pain medications as directed, before pain becomes severe.    Pain medication can upset your stomach: taking it with food may help.    Constipation is a common side effect of pain medication. Drink plenty of  fluids.    Eat foods high in fiber. Take a stool softener if recommended by your doctor or pharmacist.    Do not drink alcohol, drive or operate machinery while taking pain medications.    Ask about other ways to control pain, such as with heat, ice or relaxation.    Tylenol/Acetaminophen  Consumption  To help encourage the safe use of acetaminophen, the makers of TYLENOL  have lowered the maximum daily dose for single-ingredient Extra Strength TYLENOL  (acetaminophen) products sold in the [place] from 8 pills per day (4,000 mg) to 6 pills per day (3,000 mg). The dosing interval has also changed from 2 pills every 4-6 hours to 2 pills every 6 hours.    If you feel your pain relief is insufficient, you may take Tylenol/Acetaminophen in addition to your narcotic pain medication.     Be careful not to exceed 3,000 mg of Tylenol/Acetaminophen in a 24 hour period from all sources.    If you are taking extra strength Tylenol/acetaminophen (500 mg), the maximum dose is 6 tablets in 24 hours.    If you are taking regular strength acetaminophen (325 mg), the maximum dose is 9 tablets in 24 hours.    Call a doctor for any of the followin. Signs of infection (fever, growing tenderness at the surgery site, a large amount of drainage or bleeding, severe pain, foul-smelling drainage, redness, swelling).  2. It has been over 8 to 10 hours since surgery and you are still not able to urinate (pass water).  3. Headache for over 24 hours.      Your doctor is: Alexa Moyer           Or jesus [phone] and ask for the resident on call for:  ENT Otolaryngology  For emergency care, call the:  [facility]:  [phone] (TTY for hearing impaired: [phone])

## 2017-08-14 NOTE — IP AVS SNAPSHOT
[facility]    [address] [place] [address]    Phone:  [phone]    Fax:  [phone]                                       After Visit Summary   8/14/2017    Pavithra Wing    MRN: [id number]           After Visit Summary Signature Page     I have received my discharge instructions, and my questions have been answered. I have discussed any challenges I see with this plan with the nurse or doctor.    ..........................................................................................................................................  Patient/Patient Representative Signature      ..........................................................................................................................................  Patient Representative Print Name and Relationship to Patient    ..................................................               ................................................  Date                                            Time    ..........................................................................................................................................  Reviewed by Signature/Title    ...................................................              ..............................................  Date                                                            Time

## 2017-08-14 NOTE — OP NOTE
Date of service:  8/14/17    Preoperative diagnosis:  Developmental delay status post bilateral canal wall down mastoidectomies    Postoperative diagnosis:  Developmental delay status post bilateral canal wall down mastoidectomies    Procedure:  Bilateral ear exam under anesthesia and mastoid bowl debridement    Surgeon:  Alexa Moyer MD    Resident:  Carly Edouard MD    Anesthesia:  General    Complications:  None    EBL:  None    Specimens:  None    Findings:  Right with complete cerumen and debris impaction. Left relatively clear with mucosalization of the TM which was cultured.     Indications:  Pavithra Wing is a 22 year old female with developmental delay status post bilateral canal wall down mastoidectomies requiring mastoid bowl debridement.  The above procedure was discussed with the parents and consent was obtained.    Procedure:  Patient was taken to the operating room and placed supine on the operating table.  After mask anesthesia was performed, Time Out was then performed with confirmation of the patient, site and procedure.  The operating microscope was brought into position and the left ear was examined.  Minimal cerumen was noted on the lateral walls of the cavity which were removed.  The tympanic membrane is somewhat mucosalized and this was cultured but no otorrhea or infection noted.  The opposite ear was examined and there was noted to be a complete impaction.  This was removed with a curette and suction.  There was some squamous debris medially.  No otorrhea or infection noted.  The patient tolerated the procedure well with no complications.  Awakened and taken to the PACU in stable condition.

## 2017-08-14 NOTE — ANESTHESIA PREPROCEDURE EVALUATION
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)      Hearing impaired      Heavy menstrual period      Hyperlipidaemia LDL goal < 160 8/12/2014     Irregular menstrual bleeding      Lactose intolerance 3/14/2011     Mastoiditis     (r)     PONV (postoperative nausea and vomiting)        Past Surgical History:   Procedure Laterality Date     ADENOIDECTOMY  4/24/2014    Procedure: ADENOIDECTOMY;  Surgeon: Omar Quiroz MD;  [facility]     C ANESTH,EYE EXAM      Retinopathy of prematurity     EXAM UNDER ANESTHESIA EAR(S)  4/27/2012    Procedure:EXAM UNDER ANESTHESIA EAR(S); Surgeon:OMAR QUIROZ; [facility]     EXAM UNDER ANESTHESIA EAR(S)  4/24/2014    Procedure: EXAM UNDER ANESTHESIA EAR(S);  Surgeon: Omar Quiroz MD;  [facility]     EXAM UNDER ANESTHESIA EAR(S) Bilateral 9/2/2015    Procedure: EXAM UNDER ANESTHESIA EAR(S);  Surgeon: Speedy Griffin MD;  [facility]     EXAM UNDER ANESTHESIA EYE(S)  4/24/2014    Procedure: EXAM UNDER ANESTHESIA EYE(S);  Surgeon: Romi Mary MD;  [facility]     EXAM UNDER ANESTHESIA NOSE  4/24/2014    Procedure: EXAM UNDER ANESTHESIA NOSE;  Surgeon: Omar Quiroz MD;  [facility]     EXAM UNDER ANESTHESIA, RESTORATIONS, EXTRACTION(S) DENTAL COMPLEX, COMBINED  3/29/2012    Procedure:COMBINED EXAM UNDER ANESTHESIA, RESTORATIONS, EXTRACTION(S) DENTAL COMPLEX; Dental Exam, Radiograph, Dental Restorations, Periodontal Therapy, right ear mold impression.; Surgeon:PRITI VILLEGAS; [facility]     EXAM UNDER ANESTHESIA, RESTORATIONS, EXTRACTION(S) DENTAL, COMBINED  4/24/2014    Procedure: COMBINED EXAM UNDER ANESTHESIA, RESTORATIONS, EXTRACTION(S)  DENTAL;  Surgeon: Yun Flores DDS;  [facility]     EXAM UNDER ANESTHESIA, RESTORATIONS, EXTRACTION(S) DENTAL, COMBINED N/A 9/2/2015    Procedure: COMBINED EXAM UNDER ANESTHESIA, RESTORATIONS, EXTRACTION(S) DENTAL;  Surgeon: Andrea Portillo DDS;  [facility]     IMPLANT SOPHONO DEVICE  4/27/2012    Procedure:IMPLANT SOPHONO DEVICE; Left Sophono Implant   ; Surgeon:RANDY WARNER; [facility]     LAPAROSCOPIC HYSTERECTOMY TOTAL, SALPINGECTOMY BILATERAL Bilateral 1/20/2017    Procedure: LAPAROSCOPIC HYSTERECTOMY TOTAL, SALPINGECTOMY BILATERAL;  Surgeon: Rosy Ryan MD;  [facility]     PE TUBES       PICC INSERTION      removed 2/25/09           Allergies:   Allergies   Allergen Reactions     Benzalkonium Chloride Rash       Meds:     (Not in a hospital admission)    Current Outpatient Prescriptions   Medication Sig Dispense Refill     senna-docusate (SENOKOT-S;PERICOLACE) 8.6-50 MG per tablet Take 1 tablet by mouth 2 times daily as needed for constipation 60 tablet 0     Fiber, Guar Gum, CHEW Take 2.5 g by mouth 2 times daily       FEXOFENADINE HCL PO Take 180 mg by mouth daily        MULTI-VITAMIN OR TABS 1 DAILY       ALPRAZolam (XANAX) 0.5 MG tablet Take 1 tablet (0.5 mg) by mouth as needed for anxiety (for anxiety with blood draws) 10 tablet 0     acetaminophen (TYLENOL) 325 MG tablet Take 2 tablets (650 mg) by mouth every 4 hours as needed for mild pain 100 tablet 0     order for DME Equipment being ordered: orthotics 1 Device 0       Physical Exam:  VS: T 97.2, P Data Unavailable, /72, R 14, SpO2 100%.  Weight 47 Kg      BMP:  Recent Labs   Lab Test 11/26/08   GLC  Negative     LFTs:   No results for input(s): PROTTOTAL, ALBUMIN, BILITOTAL, ALKPHOS, AST, ALT, BILIDIRECT in the last 03349 hours.  CBC:   Recent Labs   Lab Test  02/03/10   1020  01/26/09   1315   WBC   --   10.3   RBC   --   4.98   HGB  11.6*  9.8*   HCT   --   32.0*   MCV   --   64*   MCH   --   19.7*    MCHC   --   30.6*   RDW   --   17.5*   PLT   --   337     Coags:  No results for input(s): INR, PTT, FIBR in the last 01431 hours.        Brenda Cates MD      1/20/2017  8:38 AM          Anesthesia Evaluation     . Pt has had prior anesthetic. Type: General    History of anesthetic complications   - PONV        ROS/MED HX    ENT/Pulmonary:  - neg pulmonary ROS     Neurologic:     (+)other neuro    Spinal cord injury: Severe develomental delay, blindeness.   Cardiovascular:  - neg cardiovascular ROS       METS/Exercise Tolerance:  3 - Able to walk 1-2 blocks without stopping   Hematologic:  - neg hematologic  ROS       Musculoskeletal:  - neg musculoskeletal ROS       GI/Hepatic:  - neg GI/hepatic ROS       Renal/Genitourinary:  - ROS Renal section negative       Endo:  - neg endo ROS       Psychiatric:  - neg psychiatric ROS       Infectious Disease:  - neg infectious disease ROS       Malignancy:      - no malignancy   Other: Comment: Full assistance for ADL   (+) No chance of pregnancy other significant disability Blind, Deaf, Developmental delay and Other (comment)                   Physical Exam  Normal systems: cardiovascular and pulmonary    Airway   Mallampati: III  TM distance: >3 FB  Neck ROM: full  Comment: Patient does not cooperate in full for airway exam      Dental   Comment: Several molars missing    Cardiovascular   Rhythm and rate: regular and normal      Pulmonary    breath sounds clear to auscultation                        Anesthesia Plan      History & Physical Review  History and physical reviewed and following examination; no interval change.    ASA Status:  2 .    NPO Status:  > 6 hours    Plan for MAC with Inhalation induction. Maintenance will be Inhalation.           Postoperative Care      Consents  Anesthetic plan, risks, benefits and alternatives discussed with:  Parent (Mother and/or Father) and legal guardian..                          .

## 2017-08-14 NOTE — ANESTHESIA POSTPROCEDURE EVALUATION
Patient: Kerriamon Somelmo Wing    Procedure(s):  Bilateral Ear Exam and Cerumenectomy under anesthesia - Wound Class: II-Clean Contaminated    Diagnosis:Ear Exam and Cleaning  Diagnosis Additional Information: No value filed.    Anesthesia Type:  MAC    Note:  Anesthesia Post Evaluation    Patient location during evaluation: PACU  Patient participation: Unable to participate in evaluation secondary to underlying medical condition  Level of consciousness: awake and alert  Pain management: adequate  Airway patency: patent  Cardiovascular status: hemodynamically stable  Respiratory status: nonlabored ventilation, spontaneous ventilation and room air  Hydration status: euvolemic  PONV: none     Anesthetic complications: None          Last vitals:  Vitals:    08/14/17 0800 08/14/17 0801 08/14/17 0815   BP: 119/79 120/71 120/72   Pulse:      Resp: 20 23 22   Temp: 36.6  C (97.9  F)     SpO2: 99% 99%          Electronically Signed By: Amador León MD  August 14, 2017  8:17 AM

## 2017-08-14 NOTE — IP AVS SNAPSHOT
MRN:[id number]                      After Visit Summary   8/14/2017    Pavithra Wing    MRN: [id number]           Thank you!     Thank you for choosing [facility] for your care. Our goal is always to provide you with excellent care. Hearing back from our patients is one way we can continue to improve our services. Please take a few minutes to complete the written survey that you may receive in the mail after you visit with us. Thank you!        Patient Information     Date Of Birth          1995        About your hospital stay     You were admitted on:  August 14, 2017 You last received care in the:  [facility]    You were discharged on:  August 14, 2017       Who to Call     For medical emergencies, please call 911.  For non-urgent questions about your medical care, please call your primary care provider or clinic, [phone]  For questions related to your surgery, please call your surgery clinic        Attending Provider     Provider Specialty    Alexa Moyer MD Otolaryngology       Primary Care Provider Office Phone # Fax #    [facility] [phone]      Your next 10 appointments already scheduled     Mar 14, 2018 10:15 AM CDT   [facility] with  [facility]   [facility] ([facility])    [address] [place] [id number] [phone]            Mar 21, 2018  3:00 PM CDT   (Arrive by 2:45 PM)   Return Visit with Taurus Myrick MD   [facility] ([facility])    [address] [place] [id number] [phone]              Further instructions from your care team       1.  Children's Tylenol as needed for pain.    2.  If you have any questions, please call the Pediatric ENT clinic during daytime hours or call the  and ask for the ENT resident on call during  evening/weekend hours.    [facility]  Home Care Following Anesthesia  For 24 hours after surgery:  1. Get plenty of rest.  A responsible adult must stay with you for at least 24 hours after you leave the surgery center.  2. Do not drive or use heavy equipment.  If you have weakness or tingling, don't drive or use heavy equipment until this feeling goes away.   3. Do not drink alcohol.   4. Avoid strenuous or risky activities.  Ask for help when climbing stairs.  5. You may feel lightheaded.  IF so, sit for a few minutes before standing.  Have someone help you get up.   6. If you have nausea (feel sick to your stomach): Drink only clear liquids such as apple juice, ginger ale, broth or 7-Up.  Rest may also help.  Be sure to drink enough fluids.  Move to a regular diet as you feel able.   7. You may have a slight fever.  Call the doctor if your fever is over 100 F (37.7 C) (taken under the tongue) or lasts longer than 24 hours.  8. You may have a dry mouth, a sore throat, muscle aches or trouble sleeping. These should go away after 24 hours.  9. Do not make important or legal decisions.         Tips for taking pain medications  To get the best pain relief possible, remember these points:    Take pain medications as directed, before pain becomes severe.    Pain medication can upset your stomach: taking it with food may help.    Constipation is a common side effect of pain medication. Drink plenty of  fluids.    Eat foods high in fiber. Take a stool softener if recommended by your doctor or pharmacist.    Do not drink alcohol, drive or operate machinery while taking pain medications.    Ask about other ways to control pain, such as with heat, ice or relaxation.    Tylenol/Acetaminophen Consumption  To help encourage the safe use of acetaminophen, the makers of TYLENOL  have lowered the maximum daily dose for single-ingredient Extra Strength TYLENOL  (acetaminophen) products sold in  "the [place] from 8 pills per day (4,000 mg) to 6 pills per day (3,000 mg). The dosing interval has also changed from 2 pills every 4-6 hours to 2 pills every 6 hours.    If you feel your pain relief is insufficient, you may take Tylenol/Acetaminophen in addition to your narcotic pain medication.     Be careful not to exceed 3,000 mg of Tylenol/Acetaminophen in a 24 hour period from all sources.    If you are taking extra strength Tylenol/acetaminophen (500 mg), the maximum dose is 6 tablets in 24 hours.    If you are taking regular strength acetaminophen (325 mg), the maximum dose is 9 tablets in 24 hours.    Call a doctor for any of the followin. Signs of infection (fever, growing tenderness at the surgery site, a large amount of drainage or bleeding, severe pain, foul-smelling drainage, redness, swelling).  2. It has been over 8 to 10 hours since surgery and you are still not able to urinate (pass water).  3. Headache for over 24 hours.      Your doctor is: Alexa Moyer           Or dial [phone] and ask for the resident on call for:  ENT Otolaryngology  For emergency care, call the:  [facility]:  [phone] (TTY for hearing impaired: [phone])                   Pending Results     Date and Time Order Name Status Description    2017 Tissue Culture Aerobic Bacterial In process     2017 Fungus Culture, non-blood In process             Admission Information     Date & Time Provider Department Dept. Phone    2017 Alexa Moyer MD [facility] [phone]      Your Vitals Were     Blood Pressure Pulse Temperature Respirations Height Weight    115/77 71 97.6  F (36.4  C) (Temporal) 19 1.486 m (4' 10.5\") 46.3 kg (102 lb)    Last Period Pulse Oximetry BMI (Body Mass Index)             2017 100% 20.96 kg/m2         MyChart Information     [employer] is an electronic gateway that provides easy, online access to your medical records. With " [employer], you can request a clinic appointment, read your test results, renew a prescription or communicate with your care team.     To sign up for [employer] visit the website at [email or web address]   You will be asked to enter the access code listed below, as well as some personal information. Please follow the directions to create your username and password.     Your access code is: [email or web address]  Expires: 10/5/2017  6:30 AM     Your access code will  in 90 days. If you need help or a new code, please contact your [facility] or call [phone] for assistance.        Care EveryWhere ID     This is your Care EveryWhere ID. This could be used by other organizations to access your [facility] medical records  [id number]        Equal Access to Services     RUDDY GALVIN : Topher Brandon, lan herrera, angela burr, mari martinez . So [facility] [phone].    ATENCIÓN: Si habla español, tiene a doe disposición servicios gratuitos de asistencia lingüística. Llame al [phone].    We comply with applicable federal civil rights laws and [place] laws. We do not discriminate on the basis of race, color, national origin, age, disability sex, sexual orientation or gender identity.               Review of your medicines      CONTINUE these medicines which have NOT CHANGED        Dose / Directions    acetaminophen 325 MG tablet   Commonly known as:  TYLENOL   Used for:  S/P hysterectomy        Dose:  650 mg   Take 2 tablets (650 mg) by mouth every 4 hours as needed for mild pain   Quantity:  100 tablet   Refills:  0       ALPRAZolam 0.5 MG tablet   Commonly known as:  XANAX   Used for:  Thrombocytosis (H)        Dose:  0.5 mg   Take 1 tablet (0.5 mg) by mouth as needed for anxiety (for anxiety with blood draws)   Quantity:  10 tablet   Refills:  0       FEXOFENADINE HCL PO        Dose:  180 mg   Take 180 mg by mouth daily    Refills:  0       Fiber (Guar Gum) Chew        Dose:  2.5 g   Take 2.5 g by mouth 2 times daily   Refills:  0       Multi-vitamin Tabs tablet   Generic drug:  multivitamin, therapeutic with minerals        1 DAILY   Refills:  0       order for DME   Used for:  Development delay        Equipment being ordered: orthotics   Quantity:  1 Device   Refills:  0       senna-docusate 8.6-50 MG per tablet   Commonly known as:  SENOKOT-S;PERICOLACE   Used for:  S/P hysterectomy        Dose:  1 tablet   Take 1 tablet by mouth 2 times daily as needed for constipation   Quantity:  60 tablet   Refills:  0                Protect others around you: Learn how to safely use, store and throw away your medicines at www.disposemymeds.org.             Medication List: This is a list of all your medications and when to take them. Check marks below indicate your daily home schedule. Keep this list as a reference.      Medications           Morning Afternoon Evening Bedtime As Needed    acetaminophen 325 MG tablet   Commonly known as:  TYLENOL   Take 2 tablets (650 mg) by mouth every 4 hours as needed for mild pain                                ALPRAZolam 0.5 MG tablet   Commonly known as:  XANAX   Take 1 tablet (0.5 mg) by mouth as needed for anxiety (for anxiety with blood draws)                                FEXOFENADINE HCL PO   Take 180 mg by mouth daily                                Fiber (Guar Gum) Chew   Take 2.5 g by mouth 2 times daily                                Multi-vitamin Tabs tablet   1 DAILY   Generic drug:  multivitamin, therapeutic with minerals                                order for DME   Equipment being ordered: orthotics                                senna-docusate 8.6-50 MG per tablet   Commonly known as:  SENOKOT-S;PERICOLACE   Take 1 tablet by mouth 2 times daily as needed for constipation

## 2017-08-17 ENCOUNTER — CARE COORDINATION (OUTPATIENT)
Dept: OTOLARYNGOLOGY | Facility: CLINIC | Age: 22
End: 2017-08-17

## 2017-08-17 LAB
BACTERIA SPEC CULT: NORMAL
SPECIMEN SOURCE: NORMAL

## 2017-08-17 NOTE — PROGRESS NOTES
Hi.  Would you contact her parents and let her know that the culture was negative?  Thanks.     Alexa     8-17-17 above message left on parents voice mail-Jacy Vera RN  ENT Care Coordinator   Otology  [phone]  8/17/2017 11:32 AM

## 2017-08-21 ENCOUNTER — TELEPHONE (OUTPATIENT)
Dept: OTOLARYNGOLOGY | Facility: CLINIC | Age: 22
End: 2017-08-21

## 2017-08-21 NOTE — TELEPHONE ENCOUNTER
"\"No growth.  Please let dad know.  He's deaf so hopefully he's on MyChart.  Thanks.     Alexa\"    Left above message on patient's Father's voicemail 8-21-17.    Sarai Montero RN  8/21/2017 1:52 PM    "

## 2017-09-11 LAB
FUNGUS SPEC CULT: NORMAL
SPECIMEN SOURCE: NORMAL

## 2017-09-17 ENCOUNTER — HEALTH MAINTENANCE LETTER (OUTPATIENT)
Age: 22
End: 2017-09-17

## 2017-11-27 ENCOUNTER — TELEPHONE (OUTPATIENT)
Dept: PEDIATRICS | Facility: CLINIC | Age: 22
End: 2017-11-27

## 2017-11-27 NOTE — TELEPHONE ENCOUNTER
Reason for Call:  Form, our goal is to have forms completed with 72 hours, however, some forms may require a visit or additional information.    Type of letter, form or note:  school       Who is the form from?: Patient    Where did the form come from: Patient or family brought in       What clinic location was the form placed at?: [place]    Where the form was placed: 's Box    What number is listed as a contact on the form?: [phone] or [phone]       Additional comments: pt mom or dad will pick  Up when ready    Call taken on 11/27/2017 at 3:52 PM by Lea Olvera

## 2017-11-28 NOTE — TELEPHONE ENCOUNTER
Call to mom JACOB left would like input on the manual restraint use for [employer].  Are they comfortable with this.    SEB Guillen, CNP

## 2017-11-29 NOTE — TELEPHONE ENCOUNTER
Call to mom re: questions on the restraints.  Mom is OK with them restraining her if needed. Mom can  paperwork today will be at the .    SEB Guillen, CNP    TC,    Paperwork in box.  Please take to  for .    SEB Guillen, CNP

## 2017-12-14 ENCOUNTER — HOSPITAL ENCOUNTER (OUTPATIENT)
Facility: CLINIC | Age: 22
End: 2017-12-14
Attending: DENTIST | Admitting: DENTIST
Payer: COMMERCIAL

## 2017-12-26 ENCOUNTER — OFFICE VISIT (OUTPATIENT)
Dept: PEDIATRICS | Facility: CLINIC | Age: 22
End: 2017-12-26
Payer: COMMERCIAL

## 2017-12-26 VITALS
SYSTOLIC BLOOD PRESSURE: 134 MMHG | OXYGEN SATURATION: 100 % | DIASTOLIC BLOOD PRESSURE: 86 MMHG | WEIGHT: 102 LBS | BODY MASS INDEX: 21.41 KG/M2 | HEIGHT: 58 IN | HEART RATE: 103 BPM

## 2017-12-26 DIAGNOSIS — Z01.818 PREOP GENERAL PHYSICAL EXAM: Primary | ICD-10-CM

## 2017-12-26 PROCEDURE — 99214 OFFICE O/P EST MOD 30 MIN: CPT | Performed by: NURSE PRACTITIONER

## 2017-12-26 NOTE — PATIENT INSTRUCTIONS
[facility]- Pediatric Department    If you have any questions regarding to your visit please contact:   Team Leonor:   Clinic Hours Telephone Number   SEB Carroll, SAUNDRA Khan PA-C, MARIA INES Vizcarra,    7am - 7pm Mon - Thurs  7am - 5pm Fri [phone]    After hours and weekends, call [phone]   To make an appointment at any location anytime, please call [phone] or  [employer].   Pediatric Walk-in Clinic* 8:30am - 3pm  Mon- Fri    [facility]   8:00am - 7pm  Mon- Thurs  8:00am - 5:30 pm Friday  9am - 1pm Saturday [phone]   Urgent Care - [place]      Urgent Care - [place]       11pm-9pm Monday - Friday   9am-5pm Saturday - Sunday    5pm-9pm Monday - Friday  9am-5pm Saturday - Sunday [phone] - [place]      [phone] - [place]   *Pediatric Walk-In Clinic is available for children/adolescents age 0-21 for the following symptoms:  Cough/Cold symptoms   Rashes/Itchy Skin  Sore throat    Urinary tract infection  Diarrhea    Ringworm  Ear pain    Sinus infection  Fever     Pink eye       If your provider has ordered a CT, MRI, or ultrasound for you, please call to schedule:  [facility], phone [phone], fax [phone]  [facility] radiology, [phone]    If you need a medication refill please contact your pharmacy.   Please allow 3 business days for your refills to be completed.  **For ADHD medication, patient will need a follow up clinic or Evisit at least every 3 months to obtain refills.**    Use doo (secure email communication and access to your chart) to send your primary care provider a message or make an appointment.  Ask someone on your Team how to sign up for doo or call the doo help line at [phone]  To view your child's test results online: Log into your own doo account, select your  "child's name from the tabs on the right hand side, select \"My medical record\" and select \"Test results\"  Do you have options for a visit without coming into the clinic?  [employer] offers electronic visits (E-visits) and telephone visits for certain medical concerns as well as Zipnosis online.    E-visits via [employer]- generally incur a $35.00 fee.   Telephone visits- These are billed based on time spent (in 10-minute increments) on the phone with your provider.   5-10 minutes $30.00 fee   11-20 minutes $59.00 fee   21-30 minutes $85.00 fee  Zipnosis- $25.00 fee.  More information and link available on [email or web address] homepage.       Before Your Surgery      Call your surgeon if there is any change in your health. This includes signs of a cold or flu (such as a sore throat, runny nose, cough, rash or fever).    Do not smoke, drink alcohol or take over the counter medicine (unless your surgeon or primary care doctor tells you to) for the 24 hours before and after surgery.    If you take prescribed drugs: Follow your doctor s orders about which medicines to take and which to stop until after surgery.    Eating and drinking prior to surgery: follow the instructions from your surgeon    Take a shower or bath the night before surgery. Use the soap your surgeon gave you to gently clean your skin. If you do not have soap from your surgeon, use your regular soap. Do not shave or scrub the surgery site.  Wear clean pajamas and have clean sheets on your bed.   "

## 2017-12-26 NOTE — MR AVS SNAPSHOT
After Visit Summary   12/26/2017    Pavithra Wing    MRN: [id number]           Patient Information     Date Of Birth          1995        Visit Information        Provider Department      12/26/2017 10:50 AM Ngozi Randall APRN CNP; [facility]        Today's Diagnoses     Preop general physical exam    -  1      Care Instructions    [facility]- Pediatric Department    If you have any questions regarding to your visit please contact:   Team Leonor:   Clinic Hours Telephone Number   SEB Carroll, CPNP  Willow Khan PA-C, MS Cristina Fuentes, MARIA INES Scott,    7am - 7pm Mon - Thurs  7am - 5pm Fri [phone]    After hours and weekends, call [phone]   To make an appointment at any location anytime, please call [phone] or  [email or web address].   Pediatric Walk-in Clinic* 8:30am - 3pm  Mon- Fri    [facility]   8:00am - 7pm  Mon- Thurs  8:00am - 5:30 pm Friday  9am - 1pm Saturday [phone]   Urgent Care - [place]      Urgent [facility] [place]       11pm-9pm Monday - Friday   9am-5pm Saturday - Sunday    5pm-9pm Monday - Friday  9am-5pm Saturday - Sunday [phone] - [place]      [phone] - [place]   *Pediatric Walk-In Clinic is available for children/adolescents age 0-21 for the following symptoms:  Cough/Cold symptoms   Rashes/Itchy Skin  Sore throat    Urinary tract infection  Diarrhea    Ringworm  Ear pain    Sinus infection  Fever     Pink eye       If your provider has ordered a CT, MRI, or ultrasound for you, please call to schedule:  [facility], phone [phone], fax [phone]  [facility] radiology, [phone]    If you need a medication refill please contact your pharmacy.   Please allow 3 business days for your refills to be completed.  **For ADHD medication, patient will need a  "follow up clinic or Evisit at least every 3 months to obtain refills.**    Use [employer] (secure email communication and access to your chart) to send your primary care provider a message or make an appointment.  Ask someone on your Team how to sign up for [employer] or call the [employer]ice help line at [phone]  To view your child's test results online: Log into your own [employer]ice account, select your child's name from the tabs on the right hand side, select \"My medical record\" and select \"Test results\"  Do you have options for a visit without coming into the clinic?  [employer] offers electronic visits (E-visits) and telephone visits for certain medical concerns as well as Zipnosis online.    E-visits via [employer]- generally incur a $35.00 fee.   Telephone visits- These are billed based on time spent (in 10-minute increments) on the phone with your provider.   5-10 minutes $30.00 fee   11-20 minutes $59.00 fee   21-30 minutes $85.00 fee  Zipnosis- $25.00 fee.  More information and link available on [email or web address] homepage.       Before Your Surgery      Call your surgeon if there is any change in your health. This includes signs of a cold or flu (such as a sore throat, runny nose, cough, rash or fever).    Do not smoke, drink alcohol or take over the counter medicine (unless your surgeon or primary care doctor tells you to) for the 24 hours before and after surgery.    If you take prescribed drugs: Follow your doctor s orders about which medicines to take and which to stop until after surgery.    Eating and drinking prior to surgery: follow the instructions from your surgeon    Take a shower or bath the night before surgery. Use the soap your surgeon gave you to gently clean your skin. If you do not have soap from your surgeon, use your regular soap. Do not shave or scrub the surgery site.  Wear clean pajamas and have clean sheets on your bed.           Follow-ups after your visit        Your next 10 appointments already " "scheduled     Jan 17, 2018   Procedure with Cezar Fischer DDS   [facility] [place], Same Day Surgery (--)    [address]  [place] [address]   [phone]            Mar 14, 2018 10:15 AM CDT   [facility] Draw with  [facility] DRAW   [facility])    [address]  [address] [place] [address] [phone]            Mar 21, 2018  3:00 PM CDT   (Arrive by 2:45 PM)   Return Visit with Taurus Myrick MD   [facility] ([facility])    [address]  [address] [facility] [address] [phone]              Who to contact     If you have questions or need follow up information about today's clinic visit or your schedule please contact [facility] directly at [phone].  Normal or non-critical lab and imaging results will be communicated to you by AR LLChart, letter or phone within 4 business days after the clinic has received the results. If you do not hear from us within 7 days, please contact the clinic through [employer] or phone. If you have a critical or abnormal lab result, we will notify you by phone as soon as possible.  Submit refill requests through [employer] or call your pharmacy and they will forward the refill request to us. Please allow 3 business days for your refill to be completed.          Additional Information About Your Visit        AR LLChart Information     Acorio lets you send messages to your doctor, view your test results, renew your prescriptions, schedule appointments and more. To sign up, go to [email or web address] . Click on \"Log in\" on the left side of the screen, which will take you to the Welcome page. Then click on \"Sign up Now\" on the right side of the page.     You will be asked to enter the access code listed below, as well as some personal information. Please follow the directions to create your username and password.     Your access code " "is: [email or web address]  Expires: 3/26/2018  5:28 PM     Your access code will  in 90 days. If you need help or a new code, please call your Mobile clinic or [phone].        Care EveryWhere ID     This is your Care EveryWhere ID. This could be used by other organizations to access your Mobile medical records  [id number]        Your Vitals Were     Pulse Height Last Period Pulse Oximetry BMI (Body Mass Index)       103 4' 10.25\" (1.48 m) 2017 100% 21.14 kg/m2        Blood Pressure from Last 3 Encounters:   17 134/86   17 121/67   17 138/83    Weight from Last 3 Encounters:   17 102 lb (46.3 kg)   17 102 lb (46.3 kg)   17 102 lb (46.3 kg)              Today, you had the following     No orders found for display       Primary Care Provider Office Phone # Fax #    Ngozi SEB Yepez [facility] [phone] [address] [address]        Equal Access to Services     [facility] GLENIS : Hadii aad ku hadasho Soomaali, waaxda luqadaha, qaybta kaalmada adeegyada, mari martinez . [facility] [phone].    ATENCIÓN: Si habla español, tiene a doe disposición servicios gratuitos de asistencia lingüística. [facility] [phone].    We comply with applicable federal civil rights laws and [place] laws. We do not discriminate on the basis of race, color, national origin, age, disability, sex, sexual orientation, or gender identity.            Thank you!     Thank you for choosing [facility]  for your care. Our goal is always to provide you with excellent care. Hearing back from our patients is one way we can continue to improve our services. Please take a few minutes to complete the written survey that you may receive in the mail after your visit with us. Thank you!             Your Updated Medication List - Protect others around you: Learn how to safely use, store and throw away your medicines at " www.disposemymeds.org.          This list is accurate as of: 12/26/17  5:28 PM.  Always use your most recent med list.                   Brand Name Dispense Instructions for use Diagnosis    acetaminophen 325 MG tablet    TYLENOL    100 tablet    Take 2 tablets (650 mg) by mouth every 4 hours as needed for mild pain    S/P hysterectomy       ALPRAZolam 0.5 MG tablet    XANAX    10 tablet    Take 1 tablet (0.5 mg) by mouth as needed for anxiety (for anxiety with blood draws)    Thrombocytosis (H)       FEXOFENADINE HCL PO      Take 180 mg by mouth daily        Fiber (Guar Gum) Chew      Take 2.5 g by mouth 2 times daily        Multi-vitamin Tabs tablet   Generic drug:  multivitamin, therapeutic with minerals      1 DAILY        order for DME     1 Device    Equipment being ordered: orthotics    Development delay       senna-docusate 8.6-50 MG per tablet    SENOKOT-S;PERICOLACE    60 tablet    Take 1 tablet by mouth 2 times daily as needed for constipation    S/P hysterectomy

## 2017-12-26 NOTE — NURSING NOTE
"Chief Complaint   Patient presents with     Pre-Op Exam       Initial /86  Pulse 103  Ht 4' 10.25\" (1.48 m)  Wt 102 lb (46.3 kg)  LMP 01/20/2017  SpO2 100%  BMI 21.14 kg/m2 Estimated body mass index is 21.14 kg/(m^2) as calculated from the following:    Height as of this encounter: 4' 10.25\" (1.48 m).    Weight as of this encounter: 102 lb (46.3 kg).  Medication Reconciliation: complete    Laura Salinas MA  "

## 2017-12-26 NOTE — PROGRESS NOTES
[facility]  [address] [facility] [address]  [phone]  Dept: [phone]    PRE-OP EVALUATION:  Today's date: 2017    Pavithra Wing (: 1995) presents for pre-operative evaluation assessment as requested by Cezar Boyce.  She requires evaluation and anesthesia risk assessment prior to undergoing surgery/procedure for treatment of EXTRACTION(S) DENTAL .  Proposed procedure: Extraction of Teeth # 16, 17, 32, Dental Exam, Restorations, Radiographs, Periodontal Therapy, Biopsy     Date of Surgery/ Procedure: 01/10/2018  Time of Surgery/ Procedure: EXTRACTION(S) DENTAL  Hospital/Surgical Facility: [facility]  Fax number for surgical facility: [phone] attn Jillian Stone  Primary Physician: Ngozi Randall  Type of Anesthesia Anticipated: General    Patient has a Health Care Directive or Living Will:  NO    1. NO - Do you have a history of heart attack, stroke, stent, bypass or surgery on an artery in the head, neck, heart or legs?  2. NO - Do you ever have any pain or discomfort in your chest?  3. NO - Do you have a history of  Heart Failure?  4. NO - Are you troubled by shortness of breath when: walking on the level, up a slight hill or at night?  5. NO - Do you currently have a cold, bronchitis or other respiratory infection?  6. NO - Do you have a cough, shortness of breath or wheezing?  7. NO - Do you sometimes get pains in the calves of your legs when you walk?  8. NO - Do you or anyone in your family have previous history of blood clots?  9. NO - Do you or does anyone in your family have a serious bleeding problem such as prolonged bleeding following surgeries or cuts?  10. NO - Have you ever had problems with anemia or been told to take iron pills?  11. NO - Have you had any abnormal blood loss such as black, tarry or bloody stools, or abnormal vaginal bleeding?  12. NO - Have you ever had a blood transfusion?  13. NO - Have you or  any of your relatives ever had problems with anesthesia?  14. NO - Do you have sleep apnea, excessive snoring or daytime drowsiness?  15. NO - Do you have any prosthetic heart valves?  16. NO - Do you have prosthetic joints?  17. NO - Is there any chance that you may be pregnant?        HPI:                                                      Brief HPI related to upcoming procedure:   She will be having 3 wisdom teeth extracted.  They have  been causing her some pain.        See problem list for active medical problems.  Problems all longstanding and stable, except as noted/documented.  See ROS for pertinent symptoms related to these conditions.                                                                                                  .    MEDICAL HISTORY:                                                    Patient Active Problem List    Diagnosis Date Noted     Essential thrombocythemia (H) 05/04/2017     Priority: Medium     CALR mutation positive, low risk disease for thrombosis.  Observation alone       S/P hysterectomy 01/20/2017     Priority: Medium     Hyperlipidemia with target LDL less than 160 08/12/2014     Priority: Medium     Diagnosis updated by automated process. Provider to review and confirm.       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 Bertha Queen,     3/5/12 - Left message for parent will mail [facility] packet to parent, instructed to fill out care plan. Will follow up in 2 weeks.Donna BrownRN  DX V65.8 REPLACED WITH [id number] [facility] CARE HOME (04/08/2013)       Lactose intolerance 03/14/2011     Priority: Medium     diarrhea with milk       Irregular menstrual bleeding 03/14/2011     Priority: Medium     Development delay: functional level of 2-2yo 06/17/2009     Priority: Medium     Blindness 06/17/2009     Priority: Medium      Impaired hearing 06/17/2009     Priority: Medium     Mastoiditis 06/17/2009     Priority: Medium     Constipation 06/17/2009     Priority: Medium      Past Medical History:   Diagnosis Date     Atopic dermatitis      Blindness      Constipation      Developmental delay     SEVERE     Dysgerminoma brain tumor, 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)      Hearing impaired      Heavy menstrual period      Hyperlipidaemia LDL goal < 160 8/12/2014     Irregular menstrual bleeding      Lactose intolerance 3/14/2011     Mastoiditis     (r)     PONV (postoperative nausea and vomiting)      Past Surgical History:   Procedure Laterality Date     ADENOIDECTOMY  4/24/2014    Procedure: ADENOIDECTOMY;  Surgeon: Omar Quiroz MD;  [facility]     C ANESTH,EYE EXAM      Retinopathy of prematurity     EXAM UNDER ANESTHESIA EAR(S)  4/27/2012    Procedure:EXAM UNDER ANESTHESIA EAR(S); Surgeon:OMAR QUIROZ; [facility]     EXAM UNDER ANESTHESIA EAR(S)  4/24/2014    Procedure: EXAM UNDER ANESTHESIA EAR(S);  Surgeon: Omar Quiroz MD;  [facility]     EXAM UNDER ANESTHESIA EAR(S) Bilateral 9/2/2015    Procedure: EXAM UNDER ANESTHESIA EAR(S);  Surgeon: Speedy Griffin MD;  [facility]     EXAM UNDER ANESTHESIA EAR(S) Bilateral 8/14/2017    Procedure: EXAM UNDER ANESTHESIA EAR(S);  Bilateral Ear Exam and Cerumenectomy under anesthesia;  Surgeon: Alexa Moyer MD;  [facility]     EXAM UNDER ANESTHESIA EYE(S)  4/24/2014    Procedure: EXAM UNDER ANESTHESIA EYE(S);  Surgeon: Romi Mary MD;  [facility]     EXAM UNDER ANESTHESIA NOSE  4/24/2014    Procedure: EXAM UNDER ANESTHESIA NOSE;  Surgeon: Oamr Quiroz MD;  [facility]     EXAM UNDER ANESTHESIA, RESTORATIONS, EXTRACTION(S) DENTAL COMPLEX, COMBINED  3/29/2012    Procedure:COMBINED EXAM UNDER ANESTHESIA, RESTORATIONS, EXTRACTION(S) DENTAL COMPLEX; Dental Exam, Radiograph, Dental Restorations, Periodontal Therapy, right ear mold impression.; Surgeon:PRITI VILLEGAS  SAUL; [facility]     EXAM UNDER ANESTHESIA, RESTORATIONS, EXTRACTION(S) DENTAL, COMBINED  4/24/2014    Procedure: COMBINED EXAM UNDER ANESTHESIA, RESTORATIONS, EXTRACTION(S) DENTAL;  Surgeon: Yun Flores DDS;  [facility]     EXAM UNDER ANESTHESIA, RESTORATIONS, EXTRACTION(S) DENTAL, COMBINED N/A 9/2/2015    Procedure: COMBINED EXAM UNDER ANESTHESIA, RESTORATIONS, EXTRACTION(S) DENTAL;  Surgeon: Andrea Portillo DDS;  [facility]     IMPLANT SOPHONO DEVICE  4/27/2012    Procedure:IMPLANT SOPHONO DEVICE; Left Sophono Implant   ; Surgeon:RANDY WARNER; [facility]     LAPAROSCOPIC HYSTERECTOMY TOTAL, SALPINGECTOMY BILATERAL Bilateral 1/20/2017    Procedure: LAPAROSCOPIC HYSTERECTOMY TOTAL, SALPINGECTOMY BILATERAL;  Surgeon: Rosy Ryan MD;  [facility]     PE TUBES       PICC INSERTION      removed 2/25/09     Current Outpatient Prescriptions   Medication Sig Dispense Refill     ALPRAZolam (XANAX) 0.5 MG tablet Take 1 tablet (0.5 mg) by mouth as needed for anxiety (for anxiety with blood draws) 10 tablet 0     acetaminophen (TYLENOL) 325 MG tablet Take 2 tablets (650 mg) by mouth every 4 hours as needed for mild pain 100 tablet 0     senna-docusate (SENOKOT-S;PERICOLACE) 8.6-50 MG per tablet Take 1 tablet by mouth 2 times daily as needed for constipation 60 tablet 0     order for DME Equipment being ordered: orthotics 1 Device 0     Fiber, Guar Gum, CHEW Take 2.5 g by mouth 2 times daily       FEXOFENADINE HCL PO Take 180 mg by mouth daily        MULTI-VITAMIN OR TABS 1 DAILY       OTC products: None, except as noted above    Allergies   Allergen Reactions     Benzalkonium Chloride Rash      Latex Allergy: NO    Social History   Substance Use Topics     Smoking status: Never Smoker     Smokeless tobacco: Never Used     Alcohol use No     History   Drug Use No       REVIEW OF SYSTEMS:                                                    C: NEGATIVE for fever, chills, change in  "weight  I: NEGATIVE for worrisome rashes, moles or lesions  E: NEGATIVE for vision changes or irritation  E/M: NEGATIVE for ear, mouth and throat problems  R: NEGATIVE for significant cough or SOB  B: NEGATIVE for masses, tenderness or discharge  CV: NEGATIVE for chest pain, palpitations or peripheral edema  GI: NEGATIVE for nausea, abdominal pain, heartburn, or change in bowel habits  : NEGATIVE for frequency, dysuria, or hematuria  M: NEGATIVE for significant arthralgias or myalgia  N: NEGATIVE for weakness, dizziness or paresthesias  E: NEGATIVE for temperature intolerance, skin/hair changes  H: NEGATIVE for bleeding problems  P: NEGATIVE for changes in mood or affect    EXAM:                                                    /86  Pulse 103  Ht 4' 10.25\" (1.48 m)  Wt 102 lb (46.3 kg)  LMP 01/20/2017  SpO2 100%  BMI 21.14 kg/m2    GENERAL: Active, alert, in no acute distress.  SKIN: Clear. No significant rash, abnormal pigmentation or lesions  HEAD: Normocephalic.  EYES: no examination  EARS: TM's pearly grey, normal light reflex right, LEFT TM unable to examine  NOSE: Normal without discharge.  MOUTH/THROAT: Clear. No oral lesions. Teeth intact without obvious abnormalities.  LUNGS: Clear. No rales, rhonchi, wheezing or retractions  HEART: Regular rhythm. Normal S1/S2. No murmurs. Normal pulses.  ABDOMEN: Soft, non-tender, not distended, no masses or hepatosplenomegaly. Bowel sounds normal.     DIAGNOSTICS:                                                    No labs or EKG required for low risk surgery (cataract, skin procedure, breast biopsy, etc)    Recent Labs   Lab Test  04/01/17   0821  03/08/17   1145  01/21/17   0028   HGB  13.0  13.4  10.3*   PLT  1651*  1744*  1122*   NA   --    --   142   POTASSIUM   --    --   5.1   CR   --    --   0.56        IMPRESSION:                                                    Reason for surgery/procedure:[place] teeth extraction      The proposed surgical " procedure is considered LOW risk.    REVISED CARDIAC RISK INDEX  The patient has the following serious cardiovascular risks for perioperative complications such as (MI, PE, VFib and 3  AV Block):  No serious cardiac risks  INTERPRETATION: 0 risks: Class I (very low risk - 0.4% complication rate)    The patient has the following additional risks for perioperative complications:  No identified additional risks      ICD-10-CM    1. Preop general physical exam Z01.818        RECOMMENDATIONS:                                                            APPROVAL GIVEN to proceed with proposed procedure, without further diagnostic evaluation       Signed Electronically by: Ngozi Randall, PNP, APRN CNP    Copy of this evaluation report is provided to requesting physician.    [place] Preop Guidelines

## 2018-01-04 ENCOUNTER — ANESTHESIA EVENT (OUTPATIENT)
Dept: SURGERY | Facility: CLINIC | Age: 23
End: 2018-01-04
Payer: COMMERCIAL

## 2018-01-08 NOTE — PLAN OF CARE
Oral and Maxillofacial Surgery  Pre-Op Note    OR Date: 1/10/2018    Patient Name: Pavithra Wing [id number]   Pre-op History and Physical reviewed.  Pre-Op Diagnosis: Tooth Eruption disturbance #s 16, 17, 32    Planned Procedure:   1. Extraction of teeth #s 16, 17, 32    Anesthesia Type: GA  Allergies: Benzalkonium Chloride  Labs: per anesthesia service    Staff Surgeon:   Dr. Hussein Fischer     Resident Surgeons:   Jad Pike    Consent: Verbal consent obtained from patient and father previously.  Discussion included risks/benefits/complications including but not limited post-operative pain, bleeding, swelling, infection, hardware failure/malunion, dehiscence of wounds, temporary/permanent paresthesia/anesthesia of CN V3 mental nerve distribution/CN V2 maxillary nerve distribution, failure to resolve chief complaint, or need for additional procedures. Alternatives discussed.  Patient agrees to procedure as written and will sign consent on day of surgery.  Tien Pkie DDS  Oral and Maxillofacial Surgery, PGY-2  [phone]

## 2018-01-10 ENCOUNTER — OFFICE VISIT (OUTPATIENT)
Dept: INTERPRETER SERVICES | Facility: CLINIC | Age: 23
End: 2018-01-10
Payer: COMMERCIAL

## 2018-01-10 ENCOUNTER — HOSPITAL ENCOUNTER (OUTPATIENT)
Facility: CLINIC | Age: 23
Discharge: HOME | End: 2018-01-10
Attending: DENTIST | Admitting: DENTIST
Payer: COMMERCIAL

## 2018-01-10 ENCOUNTER — ANESTHESIA (OUTPATIENT)
Dept: SURGERY | Facility: CLINIC | Age: 23
End: 2018-01-10
Payer: COMMERCIAL

## 2018-01-10 ENCOUNTER — SURGERY (OUTPATIENT)
Age: 23
End: 2018-01-10

## 2018-01-10 VITALS
WEIGHT: 103.17 LBS | BODY MASS INDEX: 21.66 KG/M2 | DIASTOLIC BLOOD PRESSURE: 81 MMHG | RESPIRATION RATE: 16 BRPM | SYSTOLIC BLOOD PRESSURE: 125 MMHG | TEMPERATURE: 99.1 F | OXYGEN SATURATION: 98 % | HEIGHT: 58 IN

## 2018-01-10 DIAGNOSIS — K01.1 DENTAL IMPACTION: Primary | ICD-10-CM

## 2018-01-10 PROCEDURE — 37000008 ZZH ANESTHESIA TECHNICAL FEE, 1ST 30 MIN: Performed by: DENTIST

## 2018-01-10 PROCEDURE — 71000027 ZZH RECOVERY PHASE 2 EACH 15 MINS: Performed by: DENTIST

## 2018-01-10 PROCEDURE — 25000128 H RX IP 250 OP 636

## 2018-01-10 PROCEDURE — 25000132 ZZH RX MED GY IP 250 OP 250 PS 637: Performed by: ANESTHESIOLOGY

## 2018-01-10 PROCEDURE — 25000128 H RX IP 250 OP 636: Performed by: NURSE ANESTHETIST, CERTIFIED REGISTERED

## 2018-01-10 PROCEDURE — 25000125 ZZHC RX 250: Performed by: DENTIST

## 2018-01-10 PROCEDURE — 71000014 ZZH RECOVERY PHASE 1 LEVEL 2 FIRST HR: Performed by: DENTIST

## 2018-01-10 PROCEDURE — 37000009 ZZH ANESTHESIA TECHNICAL FEE, EACH ADDTL 15 MIN: Performed by: DENTIST

## 2018-01-10 PROCEDURE — 36000057 ZZH SURGERY LEVEL 3 1ST 30 MIN - UMMC: Performed by: DENTIST

## 2018-01-10 PROCEDURE — T1013 SIGN LANG/ORAL INTERPRETER: HCPCS | Mod: U3

## 2018-01-10 PROCEDURE — 25000566 ZZH SEVOFLURANE, EA 15 MIN: Performed by: DENTIST

## 2018-01-10 PROCEDURE — C9399 UNCLASSIFIED DRUGS OR BIOLOG: HCPCS | Performed by: NURSE ANESTHETIST, CERTIFIED REGISTERED

## 2018-01-10 PROCEDURE — 36000059 ZZH SURGERY LEVEL 3 EA 15 ADDTL MIN UMMC: Performed by: DENTIST

## 2018-01-10 PROCEDURE — 71000015 ZZH RECOVERY PHASE 1 LEVEL 2 EA ADDTL HR: Performed by: DENTIST

## 2018-01-10 PROCEDURE — 40000170 ZZH STATISTIC PRE-PROCEDURE ASSESSMENT II: Performed by: DENTIST

## 2018-01-10 RX ORDER — SODIUM CHLORIDE, SODIUM LACTATE, POTASSIUM CHLORIDE, CALCIUM CHLORIDE 600; 310; 30; 20 MG/100ML; MG/100ML; MG/100ML; MG/100ML
INJECTION, SOLUTION INTRAVENOUS CONTINUOUS
Status: DISCONTINUED | OUTPATIENT
Start: 2018-01-10 | End: 2018-01-10 | Stop reason: HOSPADM

## 2018-01-10 RX ORDER — HYDROCODONE BITARTRATE AND ACETAMINOPHEN 5; 325 MG/1; MG/1
1 TABLET ORAL EVERY 6 HOURS PRN
Qty: 18 TABLET | Refills: 0 | Status: SHIPPED | OUTPATIENT
Start: 2018-01-10 | End: 2019-03-01

## 2018-01-10 RX ORDER — LIDOCAINE HYDROCHLORIDE AND EPINEPHRINE 10; 10 MG/ML; UG/ML
INJECTION, SOLUTION INFILTRATION; PERINEURAL PRN
Status: DISCONTINUED | OUTPATIENT
Start: 2018-01-10 | End: 2018-01-10 | Stop reason: HOSPADM

## 2018-01-10 RX ORDER — IBUPROFEN 600 MG/1
600 TABLET, FILM COATED ORAL EVERY 6 HOURS PRN
Qty: 30 TABLET | Refills: 0 | Status: SHIPPED | OUTPATIENT
Start: 2018-01-10

## 2018-01-10 RX ORDER — FENTANYL CITRATE 50 UG/ML
25-50 INJECTION, SOLUTION INTRAMUSCULAR; INTRAVENOUS EVERY 5 MIN PRN
Status: DISCONTINUED | OUTPATIENT
Start: 2018-01-10 | End: 2018-01-10 | Stop reason: HOSPADM

## 2018-01-10 RX ORDER — AMOXICILLIN 500 MG/1
500 CAPSULE ORAL 3 TIMES DAILY
Qty: 21 CAPSULE | Refills: 0 | Status: SHIPPED | OUTPATIENT
Start: 2018-01-10 | End: 2018-01-17

## 2018-01-10 RX ORDER — HYDROMORPHONE HYDROCHLORIDE 1 MG/ML
.3-.5 INJECTION, SOLUTION INTRAMUSCULAR; INTRAVENOUS; SUBCUTANEOUS EVERY 10 MIN PRN
Status: DISCONTINUED | OUTPATIENT
Start: 2018-01-10 | End: 2018-01-10 | Stop reason: HOSPADM

## 2018-01-10 RX ORDER — FENTANYL CITRATE 50 UG/ML
INJECTION, SOLUTION INTRAMUSCULAR; INTRAVENOUS PRN
Status: DISCONTINUED | OUTPATIENT
Start: 2018-01-10 | End: 2018-01-10

## 2018-01-10 RX ORDER — SODIUM CHLORIDE, SODIUM LACTATE, POTASSIUM CHLORIDE, CALCIUM CHLORIDE 600; 310; 30; 20 MG/100ML; MG/100ML; MG/100ML; MG/100ML
INJECTION, SOLUTION INTRAVENOUS CONTINUOUS PRN
Status: DISCONTINUED | OUTPATIENT
Start: 2018-01-10 | End: 2018-01-10

## 2018-01-10 RX ORDER — ONDANSETRON 2 MG/ML
4 INJECTION INTRAMUSCULAR; INTRAVENOUS EVERY 30 MIN PRN
Status: DISCONTINUED | OUTPATIENT
Start: 2018-01-10 | End: 2018-01-10 | Stop reason: HOSPADM

## 2018-01-10 RX ORDER — ONDANSETRON 2 MG/ML
INJECTION INTRAMUSCULAR; INTRAVENOUS PRN
Status: DISCONTINUED | OUTPATIENT
Start: 2018-01-10 | End: 2018-01-10

## 2018-01-10 RX ORDER — NALOXONE HYDROCHLORIDE 0.4 MG/ML
.1-.4 INJECTION, SOLUTION INTRAMUSCULAR; INTRAVENOUS; SUBCUTANEOUS
Status: DISCONTINUED | OUTPATIENT
Start: 2018-01-10 | End: 2018-01-10 | Stop reason: HOSPADM

## 2018-01-10 RX ORDER — DEXAMETHASONE SODIUM PHOSPHATE 4 MG/ML
INJECTION, SOLUTION INTRA-ARTICULAR; INTRALESIONAL; INTRAMUSCULAR; INTRAVENOUS; SOFT TISSUE PRN
Status: DISCONTINUED | OUTPATIENT
Start: 2018-01-10 | End: 2018-01-10

## 2018-01-10 RX ORDER — PROPOFOL 10 MG/ML
INJECTION, EMULSION INTRAVENOUS PRN
Status: DISCONTINUED | OUTPATIENT
Start: 2018-01-10 | End: 2018-01-10

## 2018-01-10 RX ORDER — ONDANSETRON 4 MG/1
4 TABLET, ORALLY DISINTEGRATING ORAL EVERY 30 MIN PRN
Status: DISCONTINUED | OUTPATIENT
Start: 2018-01-10 | End: 2018-01-10 | Stop reason: HOSPADM

## 2018-01-10 RX ORDER — CEFAZOLIN SODIUM 1 G/3ML
1 INJECTION, POWDER, FOR SOLUTION INTRAMUSCULAR; INTRAVENOUS SEE ADMIN INSTRUCTIONS
Status: DISCONTINUED | OUTPATIENT
Start: 2018-01-10 | End: 2018-01-10 | Stop reason: HOSPADM

## 2018-01-10 RX ORDER — MIDAZOLAM HYDROCHLORIDE 2 MG/ML
10 SYRUP ORAL ONCE
Status: COMPLETED | OUTPATIENT
Start: 2018-01-10 | End: 2018-01-10

## 2018-01-10 RX ORDER — KETOROLAC TROMETHAMINE 30 MG/ML
INJECTION, SOLUTION INTRAMUSCULAR; INTRAVENOUS PRN
Status: DISCONTINUED | OUTPATIENT
Start: 2018-01-10 | End: 2018-01-10

## 2018-01-10 RX ORDER — CHLORHEXIDINE GLUCONATE ORAL RINSE 1.2 MG/ML
10 SOLUTION DENTAL ONCE
Status: DISCONTINUED | OUTPATIENT
Start: 2018-01-10 | End: 2018-01-10 | Stop reason: HOSPADM

## 2018-01-10 RX ORDER — CEFAZOLIN SODIUM 2 G/100ML
2 INJECTION, SOLUTION INTRAVENOUS
Status: COMPLETED | OUTPATIENT
Start: 2018-01-10 | End: 2018-01-10

## 2018-01-10 RX ADMIN — FENTANYL CITRATE 25 MCG: 50 INJECTION, SOLUTION INTRAMUSCULAR; INTRAVENOUS at 14:08

## 2018-01-10 RX ADMIN — Medication 1 TABLET: at 17:36

## 2018-01-10 RX ADMIN — FENTANYL CITRATE 25 MCG: 50 INJECTION, SOLUTION INTRAMUSCULAR; INTRAVENOUS at 14:27

## 2018-01-10 RX ADMIN — CEFAZOLIN SODIUM 2 G: 2 INJECTION, SOLUTION INTRAVENOUS at 14:23

## 2018-01-10 RX ADMIN — MIDAZOLAM HYDROCHLORIDE 10 MG: 2 SYRUP ORAL at 11:59

## 2018-01-10 RX ADMIN — SODIUM CHLORIDE, POTASSIUM CHLORIDE, SODIUM LACTATE AND CALCIUM CHLORIDE: 600; 310; 30; 20 INJECTION, SOLUTION INTRAVENOUS at 13:28

## 2018-01-10 RX ADMIN — Medication 20 MG: at 13:22

## 2018-01-10 RX ADMIN — FENTANYL CITRATE 25 MCG: 50 INJECTION, SOLUTION INTRAMUSCULAR; INTRAVENOUS at 13:22

## 2018-01-10 RX ADMIN — DEXAMETHASONE SODIUM PHOSPHATE 8 MG: 4 INJECTION, SOLUTION INTRAMUSCULAR; INTRAVENOUS at 13:22

## 2018-01-10 RX ADMIN — PROPOFOL 30 MG: 10 INJECTION, EMULSION INTRAVENOUS at 14:08

## 2018-01-10 RX ADMIN — ONDANSETRON 4 MG: 2 INJECTION INTRAMUSCULAR; INTRAVENOUS at 14:40

## 2018-01-10 RX ADMIN — KETOROLAC TROMETHAMINE 20 MG: 30 INJECTION, SOLUTION INTRAMUSCULAR at 15:21

## 2018-01-10 RX ADMIN — PROPOFOL 50 MG: 10 INJECTION, EMULSION INTRAVENOUS at 13:47

## 2018-01-10 RX ADMIN — PROPOFOL 50 MG: 10 INJECTION, EMULSION INTRAVENOUS at 13:22

## 2018-01-10 RX ADMIN — LIDOCAINE HYDROCHLORIDE AND EPINEPHRINE 6 ML: 10; 10 INJECTION, SOLUTION INFILTRATION; PERINEURAL at 14:29

## 2018-01-10 RX ADMIN — SUGAMMADEX 100 MG: 100 INJECTION, SOLUTION INTRAVENOUS at 15:22

## 2018-01-10 RX ADMIN — PROPOFOL 10 MG: 10 INJECTION, EMULSION INTRAVENOUS at 15:21

## 2018-01-10 ASSESSMENT — COPD QUESTIONNAIRES: COPD: 0

## 2018-01-10 ASSESSMENT — LIFESTYLE VARIABLES: TOBACCO_USE: 0

## 2018-01-10 NOTE — OR NURSING
Assisted pt to recliner chair and she moved very well, also pt lifted her bottom off the bed to get her pants on.   Medicated with 1 norco after taking jello and sips of water, father felt as did writer that pt may be in pain as she was  Not very cooperative with taking po ans was encouraged by her dad to take po.   Pt is totally blind although she opens her eyes on occasion she mostly has them closed.   She does respond and sign to her father her needs   has been here throughout stay, interpreting discharge instructions. Line by line.

## 2018-01-10 NOTE — DISCHARGE INSTRUCTIONS
Same-Day Surgery   Adult Discharge Orders & Instructions     For 24 hours after surgery:  1. Get plenty of rest.  A responsible adult must stay with you for at least 24 hours after you leave the hospital.   2. Pain medication can slow your reflexes. Do not drive or use heavy equipment.  If you have weakness or tingling, don't drive or use heavy equipment until this feeling goes away.  3. Mixing alcohol and pain medication can cause dizziness and slow your breathing. It can even be fatal. Do not drink alcohol while taking pain medication.  4. Avoid strenuous or risky activities.  Ask for help when climbing stairs.   5. You may feel lightheaded.  If so, sit for a few minutes before standing.  Have someone help you get up.   6. If you have nausea (feel sick to your stomach), drink only clear liquids such as apple juice, ginger ale, broth or 7-Up.  Rest may also help.  Be sure to drink enough fluids.  Move to a regular diet as you feel able. Take pain medications with a small amount of solid food, such as toast or crackers, to avoid nausea.   7. A slight fever is normal. Call the doctor if your fever is over 100 F (37.7 C) (taken under the tongue) or lasts longer than 24 hours.  8. You may have a dry mouth, muscle aches, trouble sleeping or a sore throat.  These symptoms should go away after 24 hours.  9. Do not make important or legal decisions.   Pain Management:      1. Take pain medication (if prescribed) for pain as directed by your physician.        2. WARNING: If the pain medication you have been prescribed contains Tylenol  (acetaminophen), DO NOT take additional doses of Tylenol (acetaminophen).     Call your doctor for any of the followin.  Signs of infection (fever, growing tenderness at the surgery site, severe pain, a large amount of drainage or bleeding, foul-smelling drainage, redness, swelling).    2.  It has been over 8 to 10 hours since surgery and you are still not able to urinate (pee).    3.   Headache for over 24 hours.    4.  Numbness, tingling or weakness the day after surgery (if you had spinal anesthesia).  To contact a doctor, call _____________________________________ or:      [phone] and ask for the Resident On Call for:          __________________________________________ (answered 24 hours a day)      Emergency Department:  [place] [facility]: [phone]  [place] [facility] Department: [phone]               Rev. 10/2014  Discharge Instructions        Return to [facility] dental clinic in 6 months for recall and follow up.  Call the dental clinic to schedule the appointment.   Clinic phone: [phone]   Emergency post op care (after business hours and weekends) call: [phone], and ask for the dental resident on call.     Procedures Performed Today (January 10, 2018)   Dental exam, dental x-rays, cleaning, dental fillings, dental extractions, fluoride varnish.     Post-operative oral surgery instructions   Care of the mouth following a surgical procedure is essential in the healing process.  There is a certain amount of swelling, discoloration, discomfort and bleeding which can be expected.     Swelling:   Some degree of swelling is normal and can be minimized with the use of ice or cold packs applied to the area for 15-20 minutes and then removed for 15-20 minutes.  This should only be done for the first 24 hours, after 24 hours use a warm moist compress over the area for 15-20 minutes and then remove for 15-20 minutes.  Sit upright and keep your head elevated when sleeping.   Maximum swelling will occur about the second or third post-operative day and then slowly recede. Once present, it can remain swollen for up to 7 days and discomfort may persist for 10 days.     Discomfort:   A variable amount of pain follows extraction and oral surgery procedures. Tylenol, ibuprofen, or any over the counter pain medication can be used. In some cases, prescription pain medication will  be given which  should be taken exactly as directed, and taken before the local anesthetic wears off. If pain increases for more than 3 days call the clinic. Take 400 mg ibuprofen with 325 mg of tylenol   Do not take pain medication on an empty stomach as it may cause nausea.      Bleeding:   Some bleeding and oozing is to be expected for several hours.  Avoid spitting, rinsing, swishing, and use of a straw for the next 24-48 hours, as they may provoke oozing.  If bleeding is visible then place a moistened gauze pack over the area and keep firm pressure on the gauze pack for 30 minutes and then discard.  If bleeding is more than slight, use sterile gauze or a moistened tea bag over the area and again apply firm pressure for 30 minutes.     Discoloration:   Facial discoloration (black and blue bruising) often follows many extractions and oral surgery procedures. Discoloration is normal and is no cause for alarm. It may persist for as long as several weeks.     Jaw Stiffness:   For several days following most oral surgical procedures, the jaw may become somewhat stiff. Should jaw stiffness worsen after 2  weeks, call the clinic.     Numbness:    Local anesthetics may be effective for approximately 24-48 hours. If you are numb beyond this time frame, please call the clinic.     Nausea:     Nausea is common after surgery, and it is sometimes caused by pain medicines. Nausea may be reduced by preceding each pill with a small amount of soft food, then taking the pill with a large volume of water. Continue consuming clear fluids and minimize the pain medication. Call if you don't feel better or if vomiting is a problem. Soft drinks that have less carbonation may help with nausea.     Care of the mouth:   Do not rinse your mouth for 24 hours after surgery.  A day following surgery or extraction rinse with warm salt water after each meal or 3-4 times a day (One half teaspoonful of table salt in a full glass of warm water).  Resume normal oral hygiene within 24 hours after procedure. Do not brush surgical or extraction site but clean your teeth within the bounds of comfort.   Avoid use of alcohol, smoking, or carbonated drinks for 24-48 hours after surgery.  This may interfere with clot formation and slow the healing process.  Smoking is the primary cause of dry sockets.     Dry Socket:   This is a condition where the wound healing is disturbed or altered after removal of a tooth. This usually occurs within 3-10 days after removal of a tooth. Typically pain will increase and worsen, and may radiate along the jaw and into the ear. Call the clinic if you experience these symptoms.     Diet:   A soft or liquid diet is recommended for the first few days following surgery, advance as tolerated. Until local anesthesia (numbness) wears off, be careful chewing to prevent biting the numb area. Eat soft and liquid foods such as yogurt, cottage cheese, soup etc. Try not to skip a meal, and keep up normal diet.     Rest:   Rest as much as possible for the next 24 hours, avoiding any excessive amount of physical activity.     Stitches:   In some cases stitches and packing materials are placed after the procedure. They will absorb in the tissue.     Fluoride Varnish:   A 5% sodium fluoride varnish was placed over the teeth for the prevention of dental decay.  The varnish hardens on contact with saliva so the teeth may appear spotty or as if there is a thin film coating the teeth, this is normal.  The varnish should remain on your teeth for at least 4-6 hours for the maximum effect.  It is recommended that you only eat soft foods and drink cold liquids during this time, do not eat or drink anything hot and do not brush your teeth the day of your surgery.  The varnish with naturally wear away, and can be brushed off the next day.

## 2018-01-10 NOTE — BRIEF OP NOTE
[facility], [place]    Brief Operative Note    Pre-operative diagnosis: Dental Caries and Periodontal Disease   Post-operative diagnosis * No post-op diagnosis entered *  Procedure: Procedure(s):  Dental Exam,, Radiographs, Periodontal Therapy, Floride Varnish, Extraction of Teeth # 16, 17, 32 - Wound Class: II-Clean Contaminated   Extraction of Teeth # 16, 17, 3 - Wound Class: II-Clean Contaminated  Surgeon: Surgeon(s) and Role:  Panel 1:     * Chely Jean MD - Resident - Assisting     * Andrea Portillo DDS - Primary    Panel 2:     * Cezar Fischer DDS - Primary  Anesthesia: General   Estimated blood loss: Less than 10 ml  Drains: None  Specimens: * No specimens in log *  Findings:   None.  Complications: None.  Implants: None    I was present, scrubbed and directed the critical portions of the procedure. Please see the operative report for full details.     Cezar Fischer DDS, MD  Staff, Oral and Maxillofacial Surgery.

## 2018-01-10 NOTE — IP AVS SNAPSHOT
[place] [id number] [facility] [address]    Phone:  [phone]                                       After Visit Summary   1/10/2018    Pavithra Wing    MRN: [id number]           After Visit Summary Signature Page     I have received my discharge instructions, and my questions have been answered. I have discussed any challenges I see with this plan with the nurse or doctor.    ..........................................................................................................................................  Patient/Patient Representative Signature      ..........................................................................................................................................  Patient Representative Print Name and Relationship to Patient    ..................................................               ................................................  Date                                            Time    ..........................................................................................................................................  Reviewed by Signature/Title    ...................................................              ..............................................  Date                                                            Time

## 2018-01-10 NOTE — IP AVS SNAPSHOT
MRN:[id number]                      After Visit Summary   1/10/2018    Pavithra Wing    MRN: [id number]           Thank you!     Thank you for choosing [facility] for your care. Our goal is always to provide you with excellent care. Hearing back from our patients is one way we can continue to improve our services. Please take a few minutes to complete the written survey that you may receive in the mail after you visit with us. Thank you!        Patient Information     Date Of Birth          1995        About your hospital stay     You were admitted on:  January 10, 2018 You last received care in the:  [facility] PACU    You were discharged on:  January 10, 2018       Who to Call     For medical emergencies, please call 911.  For non-urgent questions about your medical care, please call your primary care provider or clinic, [phone]  For questions related to your surgery, please call your surgery clinic        Attending Provider     Provider Specialty    Cezar Fischer DDS Oral Surgery       Primary Care Provider Office Phone # Fax #    Ngozi Genetriana SEB [facility] [phone]      After Care Instructions     Discharge Instructions       No Follow up needed    Soft diet for 5-7 days    Expect oozing, bruising, swelling and pain in the surgical sites for 3-5 days    Take medications as Prescribed    Call [phone] and ask for Oral Surgery resident on call with urgernt or emergent questions                  Your next 10 appointments already scheduled     Mar 14, 2018 10:15 AM CDT   [facility] with  [facility]   [facility] ([facility] [place])    [address] [place] [address]  [address] [place] [id number] [phone]            Mar 21, 2018  3:00 PM CDT   (Arrive by 2:45 PM)   Return Visit with Taurus Myrick MD   [facility] ([facility] [place])    [address] [place] [address]  [address]  [facility] [address] [phone]              Further instructions from your care team       Same-Day Surgery   Adult Discharge Orders & Instructions     For 24 hours after surgery:  1. Get plenty of rest.  A responsible adult must stay with you for at least 24 hours after you leave the hospital.   2. Pain medication can slow your reflexes. Do not drive or use heavy equipment.  If you have weakness or tingling, don't drive or use heavy equipment until this feeling goes away.  3. Mixing alcohol and pain medication can cause dizziness and slow your breathing. It can even be fatal. Do not drink alcohol while taking pain medication.  4. Avoid strenuous or risky activities.  Ask for help when climbing stairs.   5. You may feel lightheaded.  If so, sit for a few minutes before standing.  Have someone help you get up.   6. If you have nausea (feel sick to your stomach), drink only clear liquids such as apple juice, ginger ale, broth or 7-Up.  Rest may also help.  Be sure to drink enough fluids.  Move to a regular diet as you feel able. Take pain medications with a small amount of solid food, such as toast or crackers, to avoid nausea.   7. A slight fever is normal. Call the doctor if your fever is over 100 F (37.7 C) (taken under the tongue) or lasts longer than 24 hours.  8. You may have a dry mouth, muscle aches, trouble sleeping or a sore throat.  These symptoms should go away after 24 hours.  9. Do not make important or legal decisions.   Pain Management:      1. Take pain medication (if prescribed) for pain as directed by your physician.        2. WARNING: If the pain medication you have been prescribed contains Tylenol  (acetaminophen), DO NOT take additional doses of Tylenol (acetaminophen).     Call your doctor for any of the followin.  Signs of infection (fever, growing tenderness at the surgery site, severe pain, a large amount of drainage or bleeding, foul-smelling drainage, redness,  swelling).    2.  It has been over 8 to 10 hours since surgery and you are still not able to urinate (pee).    3.  Headache for over 24 hours.    4.  Numbness, tingling or weakness the day after surgery (if you had spinal anesthesia).  To contact a doctor, call _____________________________________ or:      [phone] and ask for the Resident On Call for:          __________________________________________ (answered 24 hours a day)      Emergency Department:  [facility]: [phone]  [facility]: [phone]               Rev. 10/2014  Discharge Instructions        Return to [facility] dental clinic in 6 months for recall and follow up.  Call the dental clinic to schedule the appointment.   Clinic phone: [phone]   Emergency post op care (after business hours and weekends) call: [phone], and ask for the dental resident on call.     Procedures Performed Today (January 10, 2018)   Dental exam, dental x-rays, cleaning, dental fillings, dental extractions, fluoride varnish.     Post-operative oral surgery instructions   Care of the mouth following a surgical procedure is essential in the healing process.  There is a certain amount of swelling, discoloration, discomfort and bleeding which can be expected.     Swelling:   Some degree of swelling is normal and can be minimized with the use of ice or cold packs applied to the area for 15-20 minutes and then removed for 15-20 minutes.  This should only be done for the first 24 hours, after 24 hours use a warm moist compress over the area for 15-20 minutes and then remove for 15-20 minutes.  Sit upright and keep your head elevated when sleeping.   Maximum swelling will occur about the second or third post-operative day and then slowly recede. Once present, it can remain swollen for up to 7 days and discomfort may persist for 10 days.     Discomfort:   A variable amount of pain follows extraction and oral surgery procedures.  Tylenol, ibuprofen, or any over the counter pain medication can be used. In some cases, prescription pain medication will be given which  should be taken exactly as directed, and taken before the local anesthetic wears off. If pain increases for more than 3 days call the clinic. Take 400 mg ibuprofen with 325 mg of tylenol   Do not take pain medication on an empty stomach as it may cause nausea.      Bleeding:   Some bleeding and oozing is to be expected for several hours.  Avoid spitting, rinsing, swishing, and use of a straw for the next 24-48 hours, as they may provoke oozing.  If bleeding is visible then place a moistened gauze pack over the area and keep firm pressure on the gauze pack for 30 minutes and then discard.  If bleeding is more than slight, use sterile gauze or a moistened tea bag over the area and again apply firm pressure for 30 minutes.     Discoloration:   Facial discoloration (black and blue bruising) often follows many extractions and oral surgery procedures. Discoloration is normal and is no cause for alarm. It may persist for as long as several weeks.     Jaw Stiffness:   For several days following most oral surgical procedures, the jaw may become somewhat stiff. Should jaw stiffness worsen after 2  weeks, call the clinic.     Numbness:    Local anesthetics may be effective for approximately 24-48 hours. If you are numb beyond this time frame, please call the clinic.     Nausea:     Nausea is common after surgery, and it is sometimes caused by pain medicines. Nausea may be reduced by preceding each pill with a small amount of soft food, then taking the pill with a large volume of water. Continue consuming clear fluids and minimize the pain medication. Call if you don't feel better or if vomiting is a problem. Soft drinks that have less carbonation may help with nausea.     Care of the mouth:   Do not rinse your mouth for 24 hours after surgery.  A day following surgery or extraction rinse  with warm salt water after each meal or 3-4 times a day (One half teaspoonful of table salt in a full glass of warm water). Resume normal oral hygiene within 24 hours after procedure. Do not brush surgical or extraction site but clean your teeth within the bounds of comfort.   Avoid use of alcohol, smoking, or carbonated drinks for 24-48 hours after surgery.  This may interfere with clot formation and slow the healing process.  Smoking is the primary cause of dry sockets.     Dry Socket:   This is a condition where the wound healing is disturbed or altered after removal of a tooth. This usually occurs within 3-10 days after removal of a tooth. Typically pain will increase and worsen, and may radiate along the jaw and into the ear. Call the clinic if you experience these symptoms.     Diet:   A soft or liquid diet is recommended for the first few days following surgery, advance as tolerated. Until local anesthesia (numbness) wears off, be careful chewing to prevent biting the numb area. Eat soft and liquid foods such as yogurt, cottage cheese, soup etc. Try not to skip a meal, and keep up normal diet.     Rest:   Rest as much as possible for the next 24 hours, avoiding any excessive amount of physical activity.     Stitches:   In some cases stitches and packing materials are placed after the procedure. They will absorb in the tissue.     Fluoride Varnish:   A 5% sodium fluoride varnish was placed over the teeth for the prevention of dental decay.  The varnish hardens on contact with saliva so the teeth may appear spotty or as if there is a thin film coating the teeth, this is normal.  The varnish should remain on your teeth for at least 4-6 hours for the maximum effect.  It is recommended that you only eat soft foods and drink cold liquids during this time, do not eat or drink anything hot and do not brush your teeth the day of your surgery.  The varnish with naturally wear away, and can be brushed off the next day.   "                Additional Information     If you use hormonal birth control (such as the pill, patch, ring or implants): You'll need a second form of birth control for 7 days (condoms, a diaphragm or contraceptive foam). While in the hospital, you received a medicine called Bridion. Your normal birth control will not work as well for a week after taking this medicine.          Pending Results     No orders found from 2018 to 2018.            Admission Information     Date & Time Provider Department Dept. Phone    1/10/2018 Cezar Fischer DDS [facility] PACU [phone]      Your Vitals Were     Blood Pressure Temperature Respirations Height Weight Last Period    123 99.5  F (37.5  C) (Axillary) 21 1.48 m (4' 10.27\") 46.8 kg (103 lb 2.8 oz) 2017    Pulse Oximetry BMI (Body Mass Index)                100% 21.37 kg/m2          [facility] Information     SeeToo lets you send messages to your doctor, view your test results, renew your prescriptions, schedule appointments and more. To sign up, go to [email or web address] . Click on \"Log in\" on the left side of the screen, which will take you to the Welcome page. Then click on \"Sign up Now\" on the right side of the page.     You will be asked to enter the access code listed below, as well as some personal information. Please follow the directions to create your username and password.     Your access code is: [email or web address]  Expires: 3/26/2018  5:28 PM     Your access code will  in 90 days. If you need help or a new code, please call your [facility] clinic or [phone].        Care EveryWhere ID     This is your Care EveryWhere ID. This could be used by other organizations to access your [facility] medical records  [id number]        Equal Access to Services     [facility] GLENIS : Topher Brandon, lan herrera, qaybta milind burr, mari valentin. So [facility] [phone].    ATENCIÓN: Si habla español, tiene a doe " disposición servicios gratuitos de asistencia lingüística. Joseph lee [phone].    We comply with applicable federal civil rights laws and [place] laws. We do not discriminate on the basis of race, color, national origin, age, disability, sex, sexual orientation, or gender identity.               Review of your medicines      START taking        Dose / Directions    amoxicillin 500 MG capsule   Commonly known as:  AMOXIL   Used for:  Dental impaction        Dose:  500 mg   Take 1 capsule (500 mg) by mouth 3 times daily for 7 days   Quantity:  21 capsule   Refills:  0       HYDROcodone-acetaminophen 5-325 MG per tablet   Commonly known as:  NORCO   Used for:  Dental impaction        Dose:  1 tablet   Take 1 tablet by mouth every 6 hours as needed for pain (moderate to severe pain)   Quantity:  18 tablet   Refills:  0       ibuprofen 600 MG tablet   Commonly known as:  ADVIL/MOTRIN   Used for:  Dental impaction        Dose:  600 mg   Take 1 tablet (600 mg) by mouth every 6 hours as needed for pain or fever (mild to moderate pain, or temperature greater than 102 F)   Quantity:  30 tablet   Refills:  0         CONTINUE these medicines which have NOT CHANGED        Dose / Directions    acetaminophen 325 MG tablet   Commonly known as:  TYLENOL   Used for:  S/P hysterectomy        Dose:  650 mg   Take 2 tablets (650 mg) by mouth every 4 hours as needed for mild pain   Quantity:  100 tablet   Refills:  0       ALPRAZolam 0.5 MG tablet   Commonly known as:  XANAX   Used for:  Thrombocytosis (H)        Dose:  0.5 mg   Take 1 tablet (0.5 mg) by mouth as needed for anxiety (for anxiety with blood draws)   Quantity:  10 tablet   Refills:  0       Fiber (Guar Gum) Chew        Dose:  2.5 g   Take 2.5 g by mouth 2 times daily   Refills:  0       Multi-vitamin Tabs tablet   Generic drug:  multivitamin, therapeutic with minerals        1 DAILY   Refills:  0       order for DME   Used for:  Development delay        Equipment being  ordered: orthotics   Quantity:  1 Device   Refills:  0       senna-docusate 8.6-50 MG per tablet   Commonly known as:  SENOKOT-S;PERICOLACE   Used for:  S/P hysterectomy        Dose:  1 tablet   Take 1 tablet by mouth 2 times daily as needed for constipation   Quantity:  60 tablet   Refills:  0            Where to get your medicines      These medications were sent to [facility] [place], [place] - [address]  [address] [facility] [address], [facility] [address]     Phone:  [phone]     amoxicillin 500 MG capsule    ibuprofen 600 MG tablet         Some of these will need a paper prescription and others can be bought over the counter. Ask your nurse if you have questions.     Bring a paper prescription for each of these medications     HYDROcodone-acetaminophen 5-325 MG per tablet               ANTIBIOTIC INSTRUCTION     You've Been Prescribed an Antibiotic - Now What?  Your healthcare team thinks that you or your loved one might have an infection. Some infections can be treated with antibiotics, which are powerful, life-saving drugs. Like all medications, antibiotics have side effects and should only be used when necessary. There are some important things you should know about your antibiotic treatment.      Your healthcare team may run tests before you start taking an antibiotic.    Your team may take samples (e.g., from your blood, urine or other areas) to run tests to look for bacteria. These test can be important to determine if you need an antibiotic at all and, if you do, which antibiotic will work best.      Within a few days, your healthcare team might change or even stop your antibiotic.    Your team may start you on an antibiotic while they are working to find out what is making you sick.    Your team might change your antibiotic because test results show that a different antibiotic would be better to treat your infection.    In some cases, once your team has more information, they learn  that you do not need an antibiotic at all. They may find out that you don't have an infection, or that the antibiotic you're taking won't work against your infection. For example, an infection caused by a virus can't be treated with antibiotics. Staying on an antibiotic when you don't need it is more likely to be harmful than helpful.      You may experience side effects from your antibiotic.    Like all medications, antibiotics have side effects. Some of these can be serious.    Let you healthcare team know if you have any known allergies when you are admitted to the hospital.    One significant side effect of nearly all antibiotics is the risk of severe and sometimes deadly diarrhea caused by Clostridium difficile (C. Difficile). This occurs when a person takes antibiotics because some good germs are destroyed. Antibiotic use allows C. diificile to take over, putting patients at high risk for this serious infection.    As a patient or caregiver, it is important to understand your or your loved one's antibiotic treatment. It is especially important for caregivers to speak up when patients can't speak for themselves. Here are some important questions to ask your healthcare team.    What infection is this antibiotic treating and how do you know I have that infection?    What side effects might occur from this antibiotic?    How long will I need to take this antibiotic?    Is it safe to take this antibiotic with other medications or supplements (e.g., vitamins) that I am taking?     Are there any special directions I need to know about taking this antibiotic? For example, should I take it with food?    How will I be monitored to know whether my infection is responding to the antibiotic?    What tests may help to make sure the right antibiotic is prescribed for me?      Information provided by:  www.cdc.gov/getsmart  U.S. Department of Health and Human Services  Centers for disease Control and Prevention  National  Center for Emerging and Zoonotic Infectious Diseases  Division of Healthcare Quality Promotion         Protect others around you: Learn how to safely use, store and throw away your medicines at www.disposemymeds.org.             Medication List: This is a list of all your medications and when to take them. Check marks below indicate your daily home schedule. Keep this list as a reference.      Medications           Morning Afternoon Evening Bedtime As Needed    acetaminophen 325 MG tablet   Commonly known as:  TYLENOL   Take 2 tablets (650 mg) by mouth every 4 hours as needed for mild pain                                ALPRAZolam 0.5 MG tablet   Commonly known as:  XANAX   Take 1 tablet (0.5 mg) by mouth as needed for anxiety (for anxiety with blood draws)                                amoxicillin 500 MG capsule   Commonly known as:  AMOXIL   Take 1 capsule (500 mg) by mouth 3 times daily for 7 days                                Fiber (Guar Gum) Chew   Take 2.5 g by mouth 2 times daily                                HYDROcodone-acetaminophen 5-325 MG per tablet   Commonly known as:  NORCO   Take 1 tablet by mouth every 6 hours as needed for pain (moderate to severe pain)                                ibuprofen 600 MG tablet   Commonly known as:  ADVIL/MOTRIN   Take 1 tablet (600 mg) by mouth every 6 hours as needed for pain or fever (mild to moderate pain, or temperature greater than 102 F)                                Multi-vitamin Tabs tablet   1 DAILY   Generic drug:  multivitamin, therapeutic with minerals                                order for DME   Equipment being ordered: orthotics                                senna-docusate 8.6-50 MG per tablet   Commonly known as:  SENOKOT-S;PERICOLACE   Take 1 tablet by mouth 2 times daily as needed for constipation

## 2018-01-10 NOTE — OR NURSING
Patient is calm and cooperative in pre-op and is reassured with dad's presence. Oral versed given at 1205. Patient resting in bed with siderails up. Safety instructions reviewed with father via [facility]. Father available during procedure by TEXT only [phone], and plans to be back to waiting room before 3pm.

## 2018-01-10 NOTE — ANESTHESIA CARE TRANSFER NOTE
Patient: Pavithra Wing    Procedure(s):  Dental Exam,, Radiographs, Periodontal Therapy, Floride Varnish, Extraction of Teeth # 16, 17, 32 - Wound Class: II-Clean Contaminated   Extraction of Teeth # 16, 17, 3 - Wound Class: II-Clean Contaminated    Diagnosis: Dental Caries and Periodontal Disease   Diagnosis Additional Information: No value filed.    Anesthesia Type:   General, ETT     Note:  Airway :Face Mask  Patient transferred to:PACU  Comments: Transported to PACU with FM O2.  VSS.  Report given.  Patient comfortable.Handoff Report: Identifed the Patient, Identified the Reponsible Provider, Reviewed the pertinent medical history, Discussed the surgical course, Reviewed Intra-OP anesthesia mangement and issues during anesthesia, Set expectations for post-procedure period and Allowed opportunity for questions and acknowledgement of understanding      Vitals: (Last set prior to Anesthesia Care Transfer)    CRNA VITALS  1/10/2018 1507 - 1/10/2018 1546      1/10/2018             SpO2: 100 %                Electronically Signed By: SEB Sainz CRNA  January 10, 2018  3:46 PM

## 2018-01-10 NOTE — ANESTHESIA POSTPROCEDURE EVALUATION
Patient: Pavithra Wing    Procedure(s):  Dental Exam,, Radiographs, Periodontal Therapy, Floride Varnish, Extraction of Teeth # 16, 17, 32 - Wound Class: II-Clean Contaminated   Extraction of Teeth # 16, 17, 3 - Wound Class: II-Clean Contaminated    Diagnosis:Dental Caries and Periodontal Disease   Diagnosis Additional Information: No value filed.    Anesthesia Type:  General, ETT    Note:  Anesthesia Post Evaluation    Patient location during evaluation: PACU and Bedside  Patient participation: Unable to participate in evaluation secondary to underlying medical condition  Level of consciousness: awake  Pain management: adequate  Airway patency: patent  Cardiovascular status: acceptable  Respiratory status: acceptable  Hydration status: acceptable  PONV: none     Anesthetic complications: None          Last vitals:  Vitals:    01/10/18 1615 01/10/18 1630 01/10/18 1645   BP: 123/74 124/76 123/73   Resp: 21 20 18   Temp: 37.5  C (99.5  F)     SpO2: 100% 96% 98%         Electronically Signed By: Brenda Cates MD  January 10, 2018  5:38 PM

## 2018-01-10 NOTE — BRIEF OP NOTE
[facility], [place]    Brief Operative Note    Pre-operative diagnosis: Dental Caries and Periodontal Disease   Post-operative diagnosis Chronic gingivitis and arch length discrepancy, malocclusion  Procedure: Procedure(s):  Dental Exam,, Radiographs, Periodontal Therapy, Floride Varnish, Extraction of Teeth # 16, 17, 32 - Wound Class: II-Clean Contaminated   - Wound Class: II-Clean Contaminated  Surgeon: Surgeon(s) and Role:  Panel 1:     * Andrea Portillo, GOLD - Primary     * Chely Jean MD - Resident - Assisting    Panel 2:     * Cezar Fischer DDS - Primary        Ted Mo DDS- resident asisting  Anesthesia: General   Estimated blood loss: Less than 10 ml  Drains: None  Specimens: * No specimens in log *  Findings:   Laceration of L lingual mucosa during removal of LL impacted third molar. Laceration necessitated to locate and remove remaining root fragments during extraction procedure  Complications: None.  Implants: None.

## 2018-01-10 NOTE — ANESTHESIA PREPROCEDURE EVALUATION
Anesthesia Evaluation     . Pt has had prior anesthetic. Type: General    History of anesthetic complications   - PONV        ROS/MED HX    ENT/Pulmonary:      (-) tobacco use, asthma and COPD   Neurologic:      (-) CVA, TIA and Neuropathy   Cardiovascular:        (-) hypertension, CAD, irregular heartbeat/palpitations and stent   METS/Exercise Tolerance:     Hematologic:        (-) anemia   Musculoskeletal:         GI/Hepatic:        (-) GERD and liver disease   Renal/Genitourinary:      (-) renal disease   Endo:      (-) Type I DM, Type II DM and thyroid disease   Psychiatric:         Infectious Disease:  - neg infectious disease ROS       Malignancy:         Other:    (+) other significant disability Blind, Deaf and Developmental delay                   Physical Exam  Normal systems: cardiovascular, pulmonary and dental    Airway   Mallampati: II  TM distance: >3 FB  Neck ROM: full    Dental     Cardiovascular   Rhythm and rate: regular and normal      Pulmonary    breath sounds clear to auscultation                    Anesthesia Plan      History & Physical Review  History and physical reviewed and following examination; no interval change.    ASA Status:  2 .        Plan for General and ETT with Intravenous induction. Maintenance will be Balanced.    PONV prophylaxis:  Ondansetron (or other 5HT-3) and Dexamethasone or Solumedrol  - preop versed 10 mg PO      Postoperative Care  Postoperative pain management:  Oral pain medications and Multi-modal analgesia.      Consents  Anesthetic plan, risks, benefits and alternatives discussed with:  Parent (Mother and/or Father)..        Keegan Nieves MD  12:25 PM January 10, 2018                       .

## 2018-01-12 NOTE — OP NOTE
DATE OF PROCEDURE:  01/10/2018.      It was deemed necessary for this patient to be seen in the hospital operating room due to developmental delay and inability to be treated in a traditional dental clinic setting.      Under general anesthesia, the following operations were performed in the mouth:   1.  Bilateral dental examination.   2.  Dental radiographs.   3.  Prophylaxis.   4.  Periodontal therapy.   5.  Dental extractions x3   6.  Fluoride varnish application.      ATTENDING PHYSICIANS:  Addie Portillo and Aaliyah.      FIRST ASSISTANT DENTAL RESIDENT:  Chely Jean DDS     ANESTHESIOLOGIST:  Dr. Nieves.      SCRUB NURSE:  Maycol.      CIRCULATING NURSE:  Anuja.      CRNA:  Bethany.      PREOPERATIVE DIAGNOSES:  Suspected periodontal disease, dental caries and bony impacted upper left, lower left and lower right third molars.      POSTOPERATIVE DIAGNOSES:  Chronic generalized gingivitis, class II malocclusion with posterior right crossbite.      DESCRIPTION OF PROCEDURE:  Pavithra Wing was brought into the operating room and draped in the usual customary [facility], [place] fashion.  Following the timeout procedures, general anesthesia was administered through the patient's right naris.  A bilateral dental exam was performed and a full mouth series of 3 periapical and 4 bite wing radiographs were obtained and interpreted.  A moist throat pack was placed at 2:08 p.m.  Clinical examination revealed generalized mild plaque and generalized bleeding on probing, and periodontal pockets ranging from 2-4 mm with a 9 mm pocket on the distal buccal of the lower left second molar adjacent to the erupting third molar.  Radiographic examination revealed normal bone trabeculation and full bony impacted upper left, lower left and lower right third molars.  The local anesthetic used was 1% lidocaine with 1:100,000 epinephrine.  The total amount dispersed was 6 mL.  The following procedures were  performed:  Periodontal therapy was performed on all teeth using ultrasonic debridement, supragingival and subgingival scaling and root planing with rubber cup polishing and flossing.  Surgical extractions were performed on the maxillary left, mandibular left, and mandibular right third molars.  All extractions were performed without complications.  A full thickness flap was elevated using a #15 blade and a #9 periosteal elevator.  Upper extraction completed with straight elevators.  Lower extractions were performed using a surgical handpiece and fissure bur with copious saline irrigation.  The surgical handpiece was also used to smooth bony contours.  During the extraction of the lower left third molar, a laceration was incidentally made on the lingual mucosa.  This was necessary to retrieve the remaining root tip.  The laceration was left open due to predictable healing of these wounds.  A simple interrupted Vicryl suture was placed on the mandibular left surgical site.  Two simple interrupted Vicryl sutures were placed in the mandibular right.  Gauze hemostasis was achieved.  Fluoride varnish was applied to all teeth.  The throat pack was removed with suction at 3:37 p.m.  The oropharynx was inspected and found to be clear.  Estimated blood loss was 10 mL.      The attending doctor, Dr. Fischer, was present for the entire procedure.  The patient was extubated in the operating room and taken to the postanesthesia care unit in good condition.         ASUNCION FISCHER DDS       As dictated by GUMARO GARCIA DDS           D: 2018 13:49   T: 2018 02:39   MT: lg      Name:     DAYTON MYERS   MRN:      [id number]        Account:        [id number]   :      1995           Procedure Date: 01/10/2018      Document: [id number]

## 2018-02-26 NOTE — PROGRESS NOTES
SUBJECTIVE:   CC: Pavithra Wing is an 22 year old woman who presents for preventive health visit.     Healthy Habits:    Do you get at least three servings of calcium containing foods daily (dairy, green leafy vegetables, etc.)? yes    Amount of exercise or daily activities, outside of work: not so much    Problems taking medications regularly No    Medication side effects: No    Have you had an eye exam in the past two years? no    Do you see a dentist twice per year? yes    Do you have sleep apnea, excessive snoring or daytime drowsiness  NO          Today's PHQ-2 Score:   PHQ-2 ( 1999 Pfizer) 3/29/2017   Q1: Little interest or pleasure in doing things 0   Q2: Feeling down, depressed or hopeless 0   PHQ-2 Score 0       Abuse: Current or Past(Physical, Sexual or Emotional)- NO  Do you feel safe in your environment - NA    Social History   Substance Use Topics     Smoking status: Never Smoker     Smokeless tobacco: Never Used     Alcohol use No     If you drink alcohol do you typically have >3 drinks per day or >7 drinks per week? No                     Reviewed orders with patient.  Reviewed health maintenance and updated orders accordingly - Yes  BP Readings from Last 3 Encounters:   02/27/18 133/81   01/10/18 125/81   12/26/17 134/86    Wt Readings from Last 3 Encounters:   02/27/18 101 lb (45.8 kg)   01/10/18 103 lb 2.8 oz (46.8 kg)   12/26/17 102 lb (46.3 kg)                  Patient Active Problem List   Diagnosis     Development delay: functional level of 2-2yo     Blindness     Impaired hearing     Mastoiditis     Constipation     Lactose intolerance     Irregular menstrual bleeding     Health Care Home - tier 2     Post mastoidectomy sequelae     Hyperlipidemia with target LDL less than 160     S/P hysterectomy     Essential thrombocythemia (H)     Past Surgical History:   Procedure Laterality Date     ADENOIDECTOMY  4/24/2014    Procedure: ADENOIDECTOMY;  Surgeon: Omar Quiroz MD;   [facility]     C ANESTH,EYE EXAM      Retinopathy of prematurity     DENTAL SURGERY       EXAM UNDER ANESTHESIA EAR(S)  4/27/2012    Procedure:EXAM UNDER ANESTHESIA EAR(S); Surgeon:OMAR QUIROZ; [facility]     EXAM UNDER ANESTHESIA EAR(S)  4/24/2014    Procedure: EXAM UNDER ANESTHESIA EAR(S);  Surgeon: Omar Quiroz MD;  [facility]     EXAM UNDER ANESTHESIA EAR(S) Bilateral 9/2/2015    Procedure: EXAM UNDER ANESTHESIA EAR(S);  Surgeon: Sepedy Griffin MD;  [facility]     EXAM UNDER ANESTHESIA EAR(S) Bilateral 8/14/2017    Procedure: EXAM UNDER ANESTHESIA EAR(S);  Bilateral Ear Exam and Cerumenectomy under anesthesia;  Surgeon: Alexa Moyer MD;  [facility]     EXAM UNDER ANESTHESIA EYE(S)  4/24/2014    Procedure: EXAM UNDER ANESTHESIA EYE(S);  Surgeon: Romi Mary MD;  [facility]     EXAM UNDER ANESTHESIA NOSE  4/24/2014    Procedure: EXAM UNDER ANESTHESIA NOSE;  Surgeon: Omar Quiroz MD;  [facility]     EXAM UNDER ANESTHESIA, RESTORATIONS, EXTRACTION(S) DENTAL COMPLEX, COMBINED  3/29/2012    Procedure:COMBINED EXAM UNDER ANESTHESIA, RESTORATIONS, EXTRACTION(S) DENTAL COMPLEX; Dental Exam, Radiograph, Dental Restorations, Periodontal Therapy, right ear mold impression.; Surgeon:PRITI VILLEGAS; [facility]     EXAM UNDER ANESTHESIA, RESTORATIONS, EXTRACTION(S) DENTAL COMPLEX, COMBINED N/A 1/10/2018    Procedure: COMBINED EXAM UNDER ANESTHESIA, RESTORATIONS, EXTRACTION(S) DENTAL COMPLEX;  Dental Exam,, Radiographs, Periodontal Therapy, Floride Varnish, Extraction of Teeth # 16, 17, 32;  Surgeon: Priti Villegas DDS;  [facility]     EXAM UNDER ANESTHESIA, RESTORATIONS, EXTRACTION(S) DENTAL, COMBINED  4/24/2014    Procedure: COMBINED EXAM UNDER ANESTHESIA, RESTORATIONS, EXTRACTION(S) DENTAL;  Surgeon: Yun Flores DDS;  [facility]     EXAM UNDER ANESTHESIA, RESTORATIONS, EXTRACTION(S) DENTAL, COMBINED N/A 9/2/2015    Procedure:  COMBINED EXAM UNDER ANESTHESIA, RESTORATIONS, EXTRACTION(S) DENTAL;  Surgeon: Andrea Portillo DDS;  [facility]     IMPLANT SOPHONO DEVICE  4/27/2012    Procedure:IMPLANT SOPHONO DEVICE; Left Sophono Implant   ; Surgeon:RANDY WARNER; [facility]     LAPAROSCOPIC HYSTERECTOMY TOTAL, SALPINGECTOMY BILATERAL Bilateral 1/20/2017    Procedure: LAPAROSCOPIC HYSTERECTOMY TOTAL, SALPINGECTOMY BILATERAL;  Surgeon: Rosy Ryan MD;  [facility]     ODONTECTOMY N/A 1/10/2018    Procedure: ODONTECTOMY;   Extraction of Teeth # 16, 17, 3;  Surgeon: Cezar Fischer DDS;  [facility]     PE TUBES       PICC INSERTION      removed 2/25/09       Social History   Substance Use Topics     Smoking status: Never Smoker     Smokeless tobacco: Never Used     Alcohol use No     Family History   Problem Relation Age of Onset     Unknown/Adopted Mother      Unknown/Adopted Father      Unknown/Adopted Other      adopted         Current Outpatient Prescriptions   Medication Sig Dispense Refill     HYDROcodone-acetaminophen (NORCO) 5-325 MG per tablet Take 1 tablet by mouth every 6 hours as needed for pain (moderate to severe pain) 18 tablet 0     ibuprofen (ADVIL/MOTRIN) 600 MG tablet Take 1 tablet (600 mg) by mouth every 6 hours as needed for pain or fever (mild to moderate pain, or temperature greater than 102 F) (Patient not taking: Reported on 2/27/2018) 30 tablet 0     ALPRAZolam (XANAX) 0.5 MG tablet Take 1 tablet (0.5 mg) by mouth as needed for anxiety (for anxiety with blood draws) 10 tablet 0     acetaminophen (TYLENOL) 325 MG tablet Take 2 tablets (650 mg) by mouth every 4 hours as needed for mild pain 100 tablet 0     senna-docusate (SENOKOT-S;PERICOLACE) 8.6-50 MG per tablet Take 1 tablet by mouth 2 times daily as needed for constipation 60 tablet 0     order for DME Equipment being ordered: orthotics 1 Device 0     Fiber, Guar Gum, CHEW Take 2.5 g by mouth 2 times daily       MULTI-VITAMIN OR TABS 1  "DAILY       Allergies   Allergen Reactions     Benzalkonium Chloride Rash       Mammogram not appropriate for this patient based on age.    Pertinent mammograms are reviewed under the imaging tab.  History of abnormal Pap smear: Status post benign hysterectomy. Health Maintenance and Surgical History updated.    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)      Hearing impaired      Heavy menstrual period      Hyperlipidaemia LDL goal < 160 8/12/2014     Irregular menstrual bleeding      Lactose intolerance 3/14/2011     Mastoiditis     (r)     PONV (postoperative nausea and vomiting)         ROS:  C: NEGATIVE for fever, chills, change in weight  I: NEGATIVE for worrisome rashes, moles or lesions  E: NEGATIVE for vision changes or irritation  ENT: NEGATIVE for ear, mouth and throat problems  R: NEGATIVE for significant cough or SOB  B: NEGATIVE for masses, tenderness or discharge  CV: NEGATIVE for chest pain, palpitations or peripheral edema  GI: NEGATIVE for nausea, abdominal pain, heartburn, or change in bowel habits  : NEGATIVE for unusual urinary or vaginal symptoms. Periods are regular.  M: NEGATIVE for significant arthralgias or myalgia  N: NEGATIVE for weakness, dizziness or paresthesias  P: NEGATIVE for changes in mood or affect    OBJECTIVE:   LMP 01/20/2017   /81  Pulse 113  Temp 98.4  F (36.9  C) (Tympanic)  Resp 16  Ht 4' 11.5\" (1.511 m)  Wt 101 lb (45.8 kg)  LMP 01/20/2017  SpO2 97%  BMI 20.06 kg/m2    EXAM:  GENERAL: healthy, alert and no distress  EYES: not examined  HENT: ear canals and TM's normal, nose and mouth without ulcers or lesions  HENT: both ears: occluded with wax, nose and mouth without ulcers or lesions, oropharynx clear " "and oral mucous membranes moist  NECK: no adenopathy and no asymmetry, masses, or scars  RESP: lungs clear to auscultation - no rales, rhonchi or wheezes  BREAST: deferred  CV: regular rate and rhythm, normal S1 S2, no S3 or S4, no murmur, click or rub, no peripheral edema and peripheral pulses strong  ABDOMEN: soft, nontender, no hepatosplenomegaly, no masses and bowel sounds normal   (female): deferred  MS: no gross musculoskeletal defects noted, no edema  SKIN: no suspicious lesions or rashes  NEURO: Normal strength and tone and sensory exam grossly normal    ASSESSMENT/PLAN:   1. Wellness examination  Patient here today for health care exam prior to going to group home.    2. Need for prophylactic vaccination and inoculation against influenza    - VACCINE ADMINISTRATION, INITIAL  - FLU VAC, SPLIT VIRUS IM > 3 YO (QUADRIVALENT) [17853]    COUNSELING:   Reviewed preventive health counseling, as reflected in patient instructions         reports that she has never smoked. She has never used smokeless tobacco.    Estimated body mass index is 21.37 kg/(m^2) as calculated from the following:    Height as of 1/10/18: 4' 10.27\" (1.48 m).    Weight as of 1/10/18: 103 lb 2.8 oz (46.8 kg).       Counseling Resources:  ATP IV Guidelines  Pooled Cohorts Equation Calculator  Breast Cancer Risk Calculator  FRAX Risk Assessment  ICSI Preventive Guidelines  Dietary Guidelines for Americans, 2010  USDA's MyPlate  ASA Prophylaxis  Lung CA Screening    Ngozi Randall, PNP, APRN CNP  [facility]    Injectable Influenza Immunization Documentation    1.  Is the person to be vaccinated sick today?   No    2. Does the person to be vaccinated have an allergy to a component   of the vaccine?   No  Egg Allergy Algorithm Link    3. Has the person to be vaccinated ever had a serious reaction   to influenza vaccine in the past?   No    4. Has the person to be vaccinated ever had Guillain-Barré syndrome?   No    Form completed " by Laura Salinas MA

## 2018-02-26 NOTE — PATIENT INSTRUCTIONS
[facility]- Pediatric Department    If you have any questions regarding to your visit please contact:   Team Leonor:   Clinic Hours Telephone Number   SEB Carroll, SAUNDRA Khan PA-C, MARIA INES Vizcarra,    7am - 7pm Mon - Thurs  7am - 5pm Fri [phone]    After hours and weekends, call [phone]   To make an appointment at any location anytime, please call [phone] or  [email or web address].   Pediatric Walk-in Clinic* 8:30am - 3pm  Mon- Fri    [facility]   8:00am - 7pm  Mon- Thurs  8:00am - 5:30 pm Friday  9am - 1pm Saturday [phone]   Urgent Care - [place]      Urgent Care - [place]       11pm-9pm Monday - Friday   9am-5pm Saturday - Sunday    5pm-9pm Monday - Friday  9am-5pm Saturday - Sunday [phone] - [place]      [phone] - [place]   *Pediatric Walk-In Clinic is available for children/adolescents age 0-21 for the following symptoms:  Cough/Cold symptoms   Rashes/Itchy Skin  Sore throat    Urinary tract infection  Diarrhea    Ringworm  Ear pain    Sinus infection  Fever     Pink eye       If your provider has ordered a CT, MRI, or ultrasound for you, please call to schedule:  [facility], phone [phone], fax [phone]  [facility] radiology, [phone]    If you need a medication refill please contact your pharmacy.   Please allow 3 business days for your refills to be completed.  **For ADHD medication, patient will need a follow up clinic or Evisit at least every 3 months to obtain refills.**    Use [employer] (secure email communication and access to your chart) to send your primary care provider a message or make an appointment.  Ask someone on your Team how to sign up for [employer] or call the [employer] help line at [phone]  To view your child's test results online: Log into your own [employer] account, select your  "child's name from the tabs on the right hand side, select \"My medical record\" and select \"Test results\"  Do you have options for a visit without coming into the clinic?  [facility] offers electronic visits (E-visits) and telephone visits for certain medical concerns as well as [employer] online.    E-visits via [employer]- generally incur a $35.00 fee.   Telephone visits- These are billed based on time spent (in 10-minute increments) on the phone with your provider.   5-10 minutes $30.00 fee   11-20 minutes $59.00 fee   21-30 minutes $85.00 fee  [employer]- $25.00 fee.  More information and link available on [email or web address] homepage.       Preventive Health Recommendations  Female Ages 18 to 25     Yearly exam:     See your health care provider every year in order to  o Review health changes.   o Discuss preventive care.    o Review your medicines if your doctor has prescribed any.      You should be tested each year for STDs (sexually transmitted diseases).       After age 20, talk to your provider about how often you should have cholesterol testing.      Starting at age 21, get a Pap test every three years. If you have an abnormal result, your doctor may have you test more often.      If you are at risk for diabetes, you should have a diabetes test (fasting glucose).     Shots:     Get a flu shot each year.     Get a tetanus shot every 10 years.     Consider getting the shot (vaccine) that prevents cervical cancer (Gardasil).    Nutrition:     Eat at least 5 servings of fruits and vegetables each day.    Eat whole-grain bread, whole-wheat pasta and brown rice instead of white grains and rice.    Talk to your provider about Calcium and Vitamin D.     Lifestyle    Exercise at least 150 minutes a week each week (30 minutes a day, 5 days a week). This will help you control your weight and prevent disease.    Limit alcohol to one drink per day.    No smoking.     Wear sunscreen to prevent skin cancer.    See your dentist every six " months for an exam and cleaning.

## 2018-02-27 ENCOUNTER — OFFICE VISIT (OUTPATIENT)
Dept: PEDIATRICS | Facility: CLINIC | Age: 23
End: 2018-02-27
Payer: COMMERCIAL

## 2018-02-27 VITALS
WEIGHT: 101 LBS | HEIGHT: 60 IN | BODY MASS INDEX: 19.83 KG/M2 | DIASTOLIC BLOOD PRESSURE: 81 MMHG | HEART RATE: 113 BPM | TEMPERATURE: 98.4 F | RESPIRATION RATE: 16 BRPM | OXYGEN SATURATION: 97 % | SYSTOLIC BLOOD PRESSURE: 133 MMHG

## 2018-02-27 DIAGNOSIS — Z00.00 WELLNESS EXAMINATION: Primary | ICD-10-CM

## 2018-02-27 DIAGNOSIS — Z23 NEED FOR PROPHYLACTIC VACCINATION AND INOCULATION AGAINST INFLUENZA: ICD-10-CM

## 2018-02-27 PROCEDURE — 90471 IMMUNIZATION ADMIN: CPT | Performed by: NURSE PRACTITIONER

## 2018-02-27 PROCEDURE — 99395 PREV VISIT EST AGE 18-39: CPT | Mod: 25 | Performed by: NURSE PRACTITIONER

## 2018-02-27 PROCEDURE — 90686 IIV4 VACC NO PRSV 0.5 ML IM: CPT | Performed by: NURSE PRACTITIONER

## 2018-02-27 NOTE — MR AVS SNAPSHOT
After Visit Summary   2/27/2018    Pavithra Wing    MRN: [id number]           Patient Information     Date Of Birth          1995        Visit Information        Provider Department      2/27/2018 10:30 AM Ngozi Randall APRN CNP; ASL IS [facility]        Today's Diagnoses     Need for prophylactic vaccination and inoculation against influenza    -  1      Care Instructions    [facility]- Pediatric Department    If you have any questions regarding to your visit please contact:   Team Leonor:   Clinic Hours Telephone Number   SEB Carroll, CPNP  Willow Khan PA-C, MARIA INES Vizcarra,    7am - 7pm Mon - Thurs  7am - 5pm Fri [phone]    After hours and weekends, call [phone]   To make an appointment at any location anytime, please call [phone] or  [email or web address].   Pediatric Walk-in Clinic* 8:30am - 3pm  Mon- Fri    [facility]   8:00am - 7pm  Mon- Thurs  8:00am - 5:30 pm Friday  9am - 1pm Saturday [phone]   Urgent Care - [place]      Urgent Care - [place]       11pm-9pm Monday - Friday   9am-5pm Saturday - Sunday    5pm-9pm Monday - Friday  9am-5pm Saturday - Sunday [phone] - [place]      [phone] - [place]   *Pediatric Walk-In Clinic is available for children/adolescents age 0-21 for the following symptoms:  Cough/Cold symptoms   Rashes/Itchy Skin  Sore throat    Urinary tract infection  Diarrhea    Ringworm  Ear pain    Sinus infection  Fever     Pink eye       If your provider has ordered a CT, MRI, or ultrasound for you, please call to schedule:  Mata tyler, phone [phone], fax [phone]  [facility] radiology, [phone]    If you need a medication refill please contact your pharmacy.   Please allow 3 business days for your refills to be  "completed.  **For ADHD medication, patient will need a follow up clinic or Evisit at least every 3 months to obtain refills.**    Use [employer] (secure email communication and access to your chart) to send your primary care provider a message or make an appointment.  Ask someone on your Team how to sign up for [employer] or call the [employer] help line at [phone]  To view your child's test results online: Log into your own [employer] account, select your child's name from the tabs on the right hand side, select \"My medical record\" and select \"Test results\"  Do you have options for a visit without coming into the clinic?  [employer] offers electronic visits (E-visits) and telephone visits for certain medical concerns as well as Zipnosis online.    E-visits via [employer]- generally incur a $35.00 fee.   Telephone visits- These are billed based on time spent (in 10-minute increments) on the phone with your provider.   5-10 minutes $30.00 fee   11-20 minutes $59.00 fee   21-30 minutes $85.00 fee  Zipnosis- $25.00 fee.  More information and link available on [employer] homepage.       Preventive Health Recommendations  Female Ages 18 to 25     Yearly exam:     See your health care provider every year in order to  o Review health changes.   o Discuss preventive care.    o Review your medicines if your doctor has prescribed any.      You should be tested each year for STDs (sexually transmitted diseases).       After age 20, talk to your provider about how often you should have cholesterol testing.      Starting at age 21, get a Pap test every three years. If you have an abnormal result, your doctor may have you test more often.      If you are at risk for diabetes, you should have a diabetes test (fasting glucose).     Shots:     Get a flu shot each year.     Get a tetanus shot every 10 years.     Consider getting the shot (vaccine) that prevents cervical cancer (Gardasil).    Nutrition:     Eat at least 5 servings of fruits " and vegetables each day.    Eat whole-grain bread, whole-wheat pasta and brown rice instead of white grains and rice.    Talk to your provider about Calcium and Vitamin D.     Lifestyle    Exercise at least 150 minutes a week each week (30 minutes a day, 5 days a week). This will help you control your weight and prevent disease.    Limit alcohol to one drink per day.    No smoking.     Wear sunscreen to prevent skin cancer.    See your dentist every six months for an exam and cleaning.          Follow-ups after your visit        Your next 10 appointments already scheduled     Mar 14, 2018 10:15 AM CDT   [facility] Lab Draw with  [employer] LAB DRAW   [facility] Lab Draw ([facility])    [address] [place] [address]  [address]  [facility] [id number] [phone]            Mar 21, 2018  3:00 PM CDT   (Arrive by 2:45 PM)   Return Visit with Taurus Myrick MD   [facility] ([facility])    [address] [place] [address]  [address]  [facility] [id number] [phone]              Who to contact     If you have questions or need follow up information about today's clinic visit or your schedule please contact [facility] directly at [phone].  Normal or non-critical lab and imaging results will be communicated to you by [employer], letter or phone within 4 business days after the clinic has received the results. If you do not hear from us within 7 days, please contact the clinic through [employer] or phone. If you have a critical or abnormal lab result, we will notify you by phone as soon as possible.  Submit refill requests through [employer] or call your pharmacy and they will forward the refill request to us. Please allow 3 business days for your refill to be completed.          Additional Information About Your Visit        [employer] Information     [employer] lets you send messages to your doctor, view your test results, renew your prescriptions,  "schedule appointments and more. To sign up, go to [email or web address] . Click on \"Log in\" on the left side of the screen, which will take you to the Welcome page. Then click on \"Sign up Now\" on the right side of the page.     You will be asked to enter the access code listed below, as well as some personal information. Please follow the directions to create your username and password.     Your access code is: [email or web address]  Expires: 3/26/2018  5:28 PM     Your access code will  in 90 days. If you need help or a new code, please call your [place] clinic or [phone].        Care EveryWhere ID     This is your Care EveryWhere ID. This could be used by other organizations to access your [place] medical records  [id number]        Your Vitals Were     Pulse Temperature Respirations Height Last Period Pulse Oximetry    113 98.4  F (36.9  C) (Tympanic) 16 4' 11.5\" (1.511 m) 2017 97%    BMI (Body Mass Index)                   20.06 kg/m2            Blood Pressure from Last 3 Encounters:   18 133/81   01/10/18 125/81   17 134/86    Weight from Last 3 Encounters:   18 101 lb (45.8 kg)   01/10/18 103 lb 2.8 oz (46.8 kg)   17 102 lb (46.3 kg)              Today, you had the following     No orders found for display       Primary Care Provider Office Phone # Fax #    Ngozi SEB Yepez [facility] [phone] [address] [address] [facility] [address]        Equal Access to Services     ROEL GALVIN : Hadii jesse Brandon, waamanda luqadaha, qaybta kaalmada aminah, mari valentin. So [facility] [phone].    ATENCIÓN: Si habla español, tiene a doe disposición servicios gratuitos de asistencia lingüística. Llame al [phone].    We comply with applicable federal civil rights laws and [place] laws. We do not discriminate on the basis of race, color, national origin, age, disability, sex, sexual orientation, or gender " identity.            Thank you!     Thank you for choosing [facility]  for your care. Our goal is always to provide you with excellent care. Hearing back from our patients is one way we can continue to improve our services. Please take a few minutes to complete the written survey that you may receive in the mail after your visit with us. Thank you!             Your Updated Medication List - Protect others around you: Learn how to safely use, store and throw away your medicines at www.disposemymeds.org.          This list is accurate as of 2/27/18 11:57 AM.  Always use your most recent med list.                   Brand Name Dispense Instructions for use Diagnosis    acetaminophen 325 MG tablet    TYLENOL    100 tablet    Take 2 tablets (650 mg) by mouth every 4 hours as needed for mild pain    S/P hysterectomy       ALPRAZolam 0.5 MG tablet    XANAX    10 tablet    Take 1 tablet (0.5 mg) by mouth as needed for anxiety (for anxiety with blood draws)    Thrombocytosis (H)       Fiber (Guar Gum) Chew      Take 2.5 g by mouth 2 times daily        HYDROcodone-acetaminophen 5-325 MG per tablet    NORCO    18 tablet    Take 1 tablet by mouth every 6 hours as needed for pain (moderate to severe pain)    Dental impaction       ibuprofen 600 MG tablet    ADVIL/MOTRIN    30 tablet    Take 1 tablet (600 mg) by mouth every 6 hours as needed for pain or fever (mild to moderate pain, or temperature greater than 102 F)    Dental impaction       Multi-vitamin Tabs tablet   Generic drug:  multivitamin, therapeutic with minerals      1 DAILY        order for DME     1 Device    Equipment being ordered: orthotics    Development delay       senna-docusate 8.6-50 MG per tablet    SENOKOT-S;PERICOLACE    60 tablet    Take 1 tablet by mouth 2 times daily as needed for constipation    S/P hysterectomy

## 2018-03-01 ENCOUNTER — TELEPHONE (OUTPATIENT)
Dept: PEDIATRICS | Facility: CLINIC | Age: 23
End: 2018-03-01

## 2018-03-01 DIAGNOSIS — D75.839 THROMBOCYTOSIS: ICD-10-CM

## 2018-03-01 RX ORDER — ALPRAZOLAM 0.5 MG
0.5 TABLET ORAL PRN
Qty: 10 TABLET | Refills: 0 | Status: SHIPPED | OUTPATIENT
Start: 2018-03-01 | End: 2018-07-26

## 2018-03-01 NOTE — LETTER
To whom it may concern,    Kerriwaleska Wing (: 1995)    This patient can drink Lactaid milk with meals as desired.     Please contact the clinic with any questions or concerns.     SEB Guillen CNP/cjw

## 2018-03-01 NOTE — TELEPHONE ENCOUNTER
Patient was admitted to new group home yesterday.   Patient is lactose intolerant but will drink Lactaid per parents report.   Group home needs prescriptions written for Lactaid and also Xanax as needed. Xanax prescription pended and letter for Lactaid pended.     Routing to provider to advise.   Cristina Fuentes RN

## 2018-03-01 NOTE — TELEPHONE ENCOUNTER
Zhanna Khalil calling to get medications Xanax and Lactaid filled for the patient, and to get an order with the medication amount(s) and directions on the sheets for all staff who pass meds. Thank you

## 2018-03-08 ENCOUNTER — TELEPHONE (OUTPATIENT)
Dept: PEDIATRICS | Facility: CLINIC | Age: 23
End: 2018-03-08

## 2018-03-08 DIAGNOSIS — Z91.89 AT RISK FOR ANXIETY: ICD-10-CM

## 2018-03-08 DIAGNOSIS — R62.50 DEVELOPMENT DELAY: Primary | ICD-10-CM

## 2018-03-08 RX ORDER — ALPRAZOLAM 0.5 MG
0.5 TABLET ORAL ONCE
Qty: 1 TABLET | Refills: 0 | Status: SHIPPED | OUTPATIENT
Start: 2018-03-08 | End: 2018-03-08

## 2018-03-08 NOTE — TELEPHONE ENCOUNTER
Triage,  Where do they want the script faxed.    SEB Guillen, CNP    Triage,    Script in TC box please fax where family wants it sent.    SEB Guillen, CNP

## 2018-03-08 NOTE — TELEPHONE ENCOUNTER
Routing to provider to advise. Father requesting one tablet of Xanax for patients up coming lab work on 3/14.    Cristina Fuentes RN

## 2018-03-08 NOTE — TELEPHONE ENCOUNTER
She was seen last week.  Forgot to ask for xanax .5mg, only need 1 tablet.   She needs this to calm her nerves before she has blood drawn 3/14.  Call when done.

## 2018-03-14 DIAGNOSIS — D47.3 ESSENTIAL THROMBOCYTHEMIA (H): Primary | ICD-10-CM

## 2018-03-14 DIAGNOSIS — D47.3 ESSENTIAL THROMBOCYTHEMIA (H): ICD-10-CM

## 2018-03-14 LAB
BASOPHILS # BLD AUTO: 0.1 10E9/L (ref 0–0.2)
BASOPHILS NFR BLD AUTO: 0.9 %
DIFFERENTIAL METHOD BLD: ABNORMAL
EOSINOPHIL # BLD AUTO: 0.1 10E9/L (ref 0–0.7)
EOSINOPHIL NFR BLD AUTO: 1.1 %
ERYTHROCYTE [DISTWIDTH] IN BLOOD BY AUTOMATED COUNT: 14.7 % (ref 10–15)
HCT VFR BLD AUTO: 40.9 % (ref 35–47)
HGB BLD-MCNC: 12.7 G/DL (ref 11.7–15.7)
IMM GRANULOCYTES # BLD: 0 10E9/L (ref 0–0.4)
IMM GRANULOCYTES NFR BLD: 0.2 %
LYMPHOCYTES # BLD AUTO: 4.2 10E9/L (ref 0.8–5.3)
LYMPHOCYTES NFR BLD AUTO: 36.7 %
MCH RBC QN AUTO: 23.3 PG (ref 26.5–33)
MCHC RBC AUTO-ENTMCNC: 31.1 G/DL (ref 31.5–36.5)
MCV RBC AUTO: 75 FL (ref 78–100)
MONOCYTES # BLD AUTO: 0.8 10E9/L (ref 0–1.3)
MONOCYTES NFR BLD AUTO: 6.7 %
NEUTROPHILS # BLD AUTO: 6.2 10E9/L (ref 1.6–8.3)
NEUTROPHILS NFR BLD AUTO: 54.4 %
NRBC # BLD AUTO: 0 10*3/UL
NRBC BLD AUTO-RTO: 0 /100
PLATELET # BLD AUTO: 2287 10E9/L (ref 150–450)
RBC # BLD AUTO: 5.46 10E12/L (ref 3.8–5.2)
WBC # BLD AUTO: 11.3 10E9/L (ref 4–11)

## 2018-03-14 PROCEDURE — T1013 SIGN LANG/ORAL INTERPRETER: HCPCS | Mod: U3

## 2018-03-14 PROCEDURE — 85025 COMPLETE CBC W/AUTO DIFF WBC: CPT | Performed by: INTERNAL MEDICINE

## 2018-03-14 NOTE — NURSING NOTE
Chief Complaint   Patient presents with     Blood Draw     No lab orders, spoke with patient/family via  informing them there are no lab orders - gave them number to clinic.      Shreya Frederick RN

## 2018-03-21 ENCOUNTER — ONCOLOGY VISIT (OUTPATIENT)
Dept: ONCOLOGY | Facility: CLINIC | Age: 23
End: 2018-03-21
Attending: INTERNAL MEDICINE
Payer: COMMERCIAL

## 2018-03-21 VITALS
HEIGHT: 59 IN | DIASTOLIC BLOOD PRESSURE: 84 MMHG | OXYGEN SATURATION: 100 % | HEART RATE: 119 BPM | BODY MASS INDEX: 20.2 KG/M2 | SYSTOLIC BLOOD PRESSURE: 138 MMHG | TEMPERATURE: 97.5 F | WEIGHT: 100.2 LBS | RESPIRATION RATE: 16 BRPM

## 2018-03-21 DIAGNOSIS — D47.3 ESSENTIAL THROMBOCYTHEMIA (H): Primary | ICD-10-CM

## 2018-03-21 PROCEDURE — G0463 HOSPITAL OUTPT CLINIC VISIT: HCPCS | Mod: ZF

## 2018-03-21 PROCEDURE — 99213 OFFICE O/P EST LOW 20 MIN: CPT | Mod: GC | Performed by: INTERNAL MEDICINE

## 2018-03-21 PROCEDURE — T1013 SIGN LANG/ORAL INTERPRETER: HCPCS | Mod: U3,ZF

## 2018-03-21 RX ORDER — FEXOFENADINE HCL 180 MG/1
TABLET ORAL DAILY
COMMUNITY
Start: 2018-03-07 | End: 2019-06-25

## 2018-03-21 ASSESSMENT — PAIN SCALES - GENERAL: PAINLEVEL: NO PAIN (0)

## 2018-03-21 NOTE — LETTER
3/21/2018       RE: Pavithra Wing  [address] [facility] [address]     Dear Colleague,    Thank you for referring your patient, Pavithra Wing, to the [facility]ER [facility]. Please see a copy of my visit note below.        [facility] Hematology Follow Up Visit    Outpatient Visit Note:    Patient: Pavithra Wing  MRN: [id number]  : 1995  DERRELL: Mar 21, 2018    Pavithra Wing is a 23 year old woman with a history of severe cognitive impairment, blindness with CALR+, very low risk, ET on observation alone, who returns for routine follow up.      Forward History:  2017     - Thrombocytosis noted after hysterectomy for menorrhagia.  No significant postop bleeding.    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 She is accompanied by her father Anselmo and also a representative from her group home ([facility]). Anselmo and Neris provide all of the history. They both report that Pavithra is doing well. No concerns for bleeding or clotting. Specifically, no epistaxis, bruising, hematuria, melena/hematochezia, abdominal pain, or arm/leg swelling. Neris helps her go to the bathroom so can accurately report absence of blood in the urine and stool.     She had 3 wisdom teeth removed in January and had the expected amount of bleeding afterward. Pavithra is holding her hand up to the side of her right mouth often which may indicate that she is having dental pain in this area. She has a dental appointment scheduled in several weeks to investigate this.     She moved into a group home about 3 weeks ago and is doing well there, per Neris.     Medications:  Current Outpatient Prescriptions   Medication Sig Dispense Refill     fexofenadine (ALLEGRA) 180 MG tablet        CALCIUM 500/D 500-400 MG-UNIT CHEW        ibuprofen  "(ADVIL/MOTRIN) 600 MG tablet Take 1 tablet (600 mg) by mouth every 6 hours as needed for pain or fever (mild to moderate pain, or temperature greater than 102 F) 30 tablet 0     acetaminophen (TYLENOL) 325 MG tablet Take 2 tablets (650 mg) by mouth every 4 hours as needed for mild pain 100 tablet 0     senna-docusate (SENOKOT-S;PERICOLACE) 8.6-50 MG per tablet Take 1 tablet by mouth 2 times daily as needed for constipation 60 tablet 0     order for DME Equipment being ordered: orthotics 1 Device 0     Fiber, Guar Gum, CHEW Take 2.5 g by mouth 2 times daily       MULTI-VITAMIN OR TABS 1 DAILY       ALPRAZolam (XANAX) 0.5 MG tablet Take 1 tablet (0.5 mg) by mouth as needed for anxiety (for anxiety with blood draws) (Patient not taking: Reported on 3/21/2018) 10 tablet 0     HYDROcodone-acetaminophen (NORCO) 5-325 MG per tablet Take 1 tablet by mouth every 6 hours as needed for pain (moderate to severe pain) (Patient not taking: Reported on 3/21/2018) 18 tablet 0        Allergies:  Allergies   Allergen Reactions     Benzalkonium Chloride Rash       ROS:  A 10 point ROS is negative except as stated in the HPI    Objective:  /84  Pulse 119  Temp 97.5  F (36.4  C) (Oral)  Resp 16  Ht 1.511 m (4' 11.49\")  Wt 45.5 kg (100 lb 3.2 oz)  LMP 01/20/2017  SpO2 100%  BMI 19.91 kg/m2  Exam:   Gen: Appears well, no distress.  She does not make eye contact and is unable to communicate  HEENT: no scleral icterus or hemorrhage, no wet purpura, no lymphadenopathy  CV: slight tachycardia, no murmurs  Ext: no swelling of arms or legs    Labs:  Results for DAYTON MYERSZULAY (MRN [id number]) as of 3/21/2018 14:46   Ref. Range 1/20/2017 12:30 1/21/2017 00:28 3/8/2017 11:45 4/1/2017 08:03 4/1/2017 08:21 8/14/2017 07:21 8/14/2017 07:21 3/14/2018 11:26   WBC Latest Ref Range: 4.0 - 11.0 10e9/L 15.8 (H) 9.6 12.7 (H)  11.8 (H)   11.3 (H)   Hemoglobin Latest Ref Range: 11.7 - 15.7 g/dL 9.7 (L) 10.3 (L) 13.4  13.0   12.7 "   Hematocrit Latest Ref Range: 35.0 - 47.0 % 30.4 (L) 31.0 (L) 41.7  41.9   40.9   Platelet Count Latest Ref Range: 150 - 450 10e9/L 1054 (HH) 1122 (HH) 1744 (HH)  1651 (HH)   2287 (HH)   RBC Count Latest Ref Range: 3.8 - 5.2 10e12/L 3.91 4.07 5.28 (H)  5.31 (H)   5.46 (H)   MCV Latest Ref Range: 78 - 100 fl 78 76 (L) 79  79   75 (L)   MCH Latest Ref Range: 26.5 - 33.0 pg 24.8 (L) 25.3 (L) 25.4 (L)  24.5 (L)   23.3 (L)   MCHC Latest Ref Range: 31.5 - 36.5 g/dL 31.9 33.2 32.1  31.0 (L)   31.1 (L)   RDW Latest Ref Range: 10.0 - 15.0 % 14.1 13.9 13.9  14.3   14.7       Imaging:  none    Assessment:  Pavithra Wing is a 23 year old woman with a history of severe cognitive impairment, blindness with CALR+ ET (very low risk) who returns for routine follow up. Platelet level is increased per labs last week to 2.2 million. She has no symptoms of bleeding or clotting to suggest a need for therapy so we will continue with observation. Blood draws are very difficult for her and require sedation, so the higher platelet level may be reactive from a stress response rather than reflecting a true higher baseline for her. We discussed this reasoning with her father and Neris, and reviewed symptoms that would require our attention. They demonstrating good understanding.     Plan:  1. Follow-up in 6 months, with repeat blood counts the week prior   2. They will call sooner if she develops concerning symptoms. We provided written instructions about the potential complications of ET.       Patient seen and discussed with staff Dr. Ramez Minaya MD  Heme/Onc Fellow  Pager: [phone]    Physician Attestation   I saw this patient with the resident and agree with the resident s findings and plan of care as documented in the resident s note.      Briefly, Ms Wing is a 22 YO woman with severe cognitive disability and very low risk ET.  Platelets may be slightly elevated due to stress compared with previous.  No signs or  symptoms to make me concerned we have to start cytoreduction.  Discussed our observational plan with family and caregivers    Total Time Spent:  I spent a total of 15 minutes face-to-face with Pavithra Wing during today's office visit.  Over 50% of this time was spent counseling the patient and/or coordinating care regarding ET.    Taurus Myrick MD   of Medicine  [employer]

## 2018-03-21 NOTE — MR AVS SNAPSHOT
After Visit Summary   3/21/2018    Pavithra Wing    MRN: [id number]           Patient Information     Date Of Birth          1995        Visit Information        Provider Department      3/21/2018 2:45 PM Gordon Butcher; Taurus Myrick MD [facility]        Today's Diagnoses     Essential thrombocythemia (H)    -  1       Follow-ups after your visit        Follow-up notes from your care team     Return in about 6 months (around 9/21/2018).      Your next 10 appointments already scheduled     Aug 20, 2018 10:00 AM CDT   [facility] Lab Draw with  [employer] LAB DRAW   [facility] Lab Draw ([facility])    [address] [place] [address]  [address]  [facility] [address] [phone]            Sep 05, 2018  3:00 PM CDT   (Arrive by 2:45 PM)   Return Visit with Taurus Myrick MD   [facility] ([facility])    [address] [place] [address]  [address]  [facility] [address] [phone]              Future tests that were ordered for you today     Open Future Orders        Priority Expected Expires Ordered    *CBC with platelets differential Routine 9/21/2018 3/21/2019 3/21/2018            Who to contact     If you have questions or need follow up information about today's clinic visit or your schedule please contact [facility] directly at [phone].  Normal or non-critical lab and imaging results will be communicated to you by MyChart, letter or phone within 4 business days after the clinic has received the results. If you do not hear from us within 7 days, please contact the clinic through MyChart or phone. If you have a critical or abnormal lab result, we will notify you by phone as soon as possible.  Submit refill requests through [employer] or call your pharmacy and they will forward the refill request to us. Please allow 3 business days for your refill to be completed.           "Additional Information About Your Visit        MyChart Information     [employer] lets you send messages to your doctor, view your test results, renew your prescriptions, schedule appointments and more. To sign up, go to [email or web address] . Click on \"Log in\" on the left side of the screen, which will take you to the Welcome page. Then click on \"Sign up Now\" on the right side of the page.     You will be asked to enter the access code listed below, as well as some personal information. Please follow the directions to create your username and password.     Your access code is: [email or web address]  Expires: 3/26/2018  6:28 PM     Your access code will  in 90 days. If you need help or a new code, please call your [place] clinic or [phone].        Care EveryWhere ID     This is your Care EveryWhere ID. This could be used by other organizations to access your [place] medical records  [id number]        Your Vitals Were     Pulse Temperature Respirations Height Last Period Pulse Oximetry    119 97.5  F (36.4  C) (Oral) 16 1.511 m (4' 11.49\") 2017 100%    BMI (Body Mass Index)                   19.91 kg/m2            Blood Pressure from Last 3 Encounters:   18 138/84   18 133/81   01/10/18 125/81    Weight from Last 3 Encounters:   18 45.5 kg (100 lb 3.2 oz)   18 45.8 kg (101 lb)   01/10/18 46.8 kg (103 lb 2.8 oz)               Primary Care Provider Office Phone # Fax #    Ngozi SEB Yepez [facility] [phone] [address] [address] [place] [facility] [address]        Equal Access to Services     RUDDY GALVIN : Topher Brandon, waasuncion herrera, qaybta kaalmamindy burr, mair valentin. So [facility] [phone].    ATENCIÓN: Si habla español, tiene a doe disposición servicios gratuitos de asistencia lingüística. Llame al [phone].    We comply with applicable federal civil rights laws and [place] laws. We do not discriminate " on the basis of race, color, national origin, age, disability, sex, sexual orientation, or gender identity.            Thank you!     Thank you for choosing [facility]  for your care. Our goal is always to provide you with excellent care. Hearing back from our patients is one way we can continue to improve our services. Please take a few minutes to complete the written survey that you may receive in the mail after your visit with us. Thank you!             Your Updated Medication List - Protect others around you: Learn how to safely use, store and throw away your medicines at www.disposemymeds.org.          This list is accurate as of 3/21/18  3:36 PM.  Always use your most recent med list.                   Brand Name Dispense Instructions for use Diagnosis    acetaminophen 325 MG tablet    TYLENOL    100 tablet    Take 2 tablets (650 mg) by mouth every 4 hours as needed for mild pain    S/P hysterectomy       ALPRAZolam 0.5 MG tablet    XANAX    10 tablet    Take 1 tablet (0.5 mg) by mouth as needed for anxiety (for anxiety with blood draws)    Thrombocytosis (H)       calcium 500/D 500-400 MG-UNIT Chew   Generic drug:  Calcium Carb-Cholecalciferol           fexofenadine 180 MG tablet    ALLEGRA          Fiber (Guar Gum) Chew      Take 2.5 g by mouth 2 times daily        HYDROcodone-acetaminophen 5-325 MG per tablet    NORCO    18 tablet    Take 1 tablet by mouth every 6 hours as needed for pain (moderate to severe pain)    Dental impaction       ibuprofen 600 MG tablet    ADVIL/MOTRIN    30 tablet    Take 1 tablet (600 mg) by mouth every 6 hours as needed for pain or fever (mild to moderate pain, or temperature greater than 102 F)    Dental impaction       Multi-vitamin Tabs tablet   Generic drug:  multivitamin, therapeutic with minerals      1 DAILY        order for DME     1 Device    Equipment being ordered: orthotics    Development delay       senna-docusate 8.6-50 MG per tablet     SENOKOT-S;PERICOLACE    60 tablet    Take 1 tablet by mouth 2 times daily as needed for constipation    S/P hysterectomy

## 2018-03-21 NOTE — PROGRESS NOTES
[facility] Hematology Follow Up Visit    Outpatient Visit Note:    Patient: Pavithra Wing  MRN: [id number]  : 1995  DERRELL: Mar 21, 2018    Pavithra Wing is a 23 year old woman with a history of severe cognitive impairment, blindness with CALR+, very low risk, ET on observation alone, who returns for routine follow up.      Forward History:  2017     - Thrombocytosis noted after hysterectomy for menorrhagia.  No significant postop bleeding.    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 She is accompanied by her father Anselmo and also a representative from her group home ([facility]). Anselmo and [facility] provide all of the history. They both report that Pavithra is doing well. No concerns for bleeding or clotting. Specifically, no epistaxis, bruising, hematuria, melena/hematochezia, abdominal pain, or arm/leg swelling. [facility] helps her go to the bathroom so can accurately report absence of blood in the urine and stool.     She had 3 wisdom teeth removed in January and had the expected amount of bleeding afterward. Pavithra is holding her hand up to the side of her right mouth often which may indicate that she is having dental pain in this area. She has a dental appointment scheduled in several weeks to investigate this.     She moved into a group home about 3 weeks ago and is doing well there, per [facility].     Medications:  Current Outpatient Prescriptions   Medication Sig Dispense Refill     fexofenadine (ALLEGRA) 180 MG tablet        CALCIUM 500/D 500-400 MG-UNIT CHEW        ibuprofen (ADVIL/MOTRIN) 600 MG tablet Take 1 tablet (600 mg) by mouth every 6 hours as needed for pain or fever (mild to moderate pain, or temperature greater than 102 F) 30 tablet 0     acetaminophen (TYLENOL) 325 MG tablet Take 2 tablets (650 mg) by mouth every 4 hours as  "needed for mild pain 100 tablet 0     senna-docusate (SENOKOT-S;PERICOLACE) 8.6-50 MG per tablet Take 1 tablet by mouth 2 times daily as needed for constipation 60 tablet 0     order for DME Equipment being ordered: orthotics 1 Device 0     Fiber, Guar Gum, CHEW Take 2.5 g by mouth 2 times daily       MULTI-VITAMIN OR TABS 1 DAILY       ALPRAZolam (XANAX) 0.5 MG tablet Take 1 tablet (0.5 mg) by mouth as needed for anxiety (for anxiety with blood draws) (Patient not taking: Reported on 3/21/2018) 10 tablet 0     HYDROcodone-acetaminophen (NORCO) 5-325 MG per tablet Take 1 tablet by mouth every 6 hours as needed for pain (moderate to severe pain) (Patient not taking: Reported on 3/21/2018) 18 tablet 0        Allergies:  Allergies   Allergen Reactions     Benzalkonium Chloride Rash       ROS:  A 10 point ROS is negative except as stated in the HPI    Objective:  /84  Pulse 119  Temp 97.5  F (36.4  C) (Oral)  Resp 16  Ht 1.511 m (4' 11.49\")  Wt 45.5 kg (100 lb 3.2 oz)  LMP 01/20/2017  SpO2 100%  BMI 19.91 kg/m2  Exam:   Gen: Appears well, no distress.  She does not make eye contact and is unable to communicate  HEENT: no scleral icterus or hemorrhage, no wet purpura, no lymphadenopathy  CV: slight tachycardia, no murmurs  Ext: no swelling of arms or legs    Labs:  Results for DAYTON MYERS CAROIGORCASTRO (MRN [id number]) as of 3/21/2018 14:46   Ref. Range 1/20/2017 12:30 1/21/2017 00:28 3/8/2017 11:45 4/1/2017 08:03 4/1/2017 08:21 8/14/2017 07:21 8/14/2017 07:21 3/14/2018 11:26   WBC Latest Ref Range: 4.0 - 11.0 10e9/L 15.8 (H) 9.6 12.7 (H)  11.8 (H)   11.3 (H)   Hemoglobin Latest Ref Range: 11.7 - 15.7 g/dL 9.7 (L) 10.3 (L) 13.4  13.0   12.7   Hematocrit Latest Ref Range: 35.0 - 47.0 % 30.4 (L) 31.0 (L) 41.7  41.9   40.9   Platelet Count Latest Ref Range: 150 - 450 10e9/L 1054 () 1122 () 1744 ()  1651 ()   2287 ()   RBC Count Latest Ref Range: 3.8 - 5.2 10e12/L 3.91 4.07 5.28 (H)  5.31 (H)   5.46 " (H)   MCV Latest Ref Range: 78 - 100 fl 78 76 (L) 79  79   75 (L)   MCH Latest Ref Range: 26.5 - 33.0 pg 24.8 (L) 25.3 (L) 25.4 (L)  24.5 (L)   23.3 (L)   MCHC Latest Ref Range: 31.5 - 36.5 g/dL 31.9 33.2 32.1  31.0 (L)   31.1 (L)   RDW Latest Ref Range: 10.0 - 15.0 % 14.1 13.9 13.9  14.3   14.7       Imaging:  none    Assessment:  Pavithra Wing is a 23 year old woman with a history of severe cognitive impairment, blindness with CALR+ ET (very low risk) who returns for routine follow up. Platelet level is increased per labs last week to 2.2 million. She has no symptoms of bleeding or clotting to suggest a need for therapy so we will continue with observation. Blood draws are very difficult for her and require sedation, so the higher platelet level may be reactive from a stress response rather than reflecting a true higher baseline for her. We discussed this reasoning with her father and [facility], and reviewed symptoms that would require our attention. They demonstrating good understanding.     Plan:  1. Follow-up in 6 months, with repeat blood counts the week prior   2. They will call sooner if she develops concerning symptoms. We provided written instructions about the potential complications of ET.       Patient seen and discussed with staff Dr. Ramez Minaya MD  Heme/Onc Fellow  Pager: [phone]    Physician Attestation   I saw this patient with the resident and agree with the resident s findings and plan of care as documented in the resident s note.      Briefly, Ms Wing is a 22 YO woman with severe cognitive disability and very low risk ET.  Platelets may be slightly elevated due to stress compared with previous.  No signs or symptoms to make me concerned we have to start cytoreduction.  Discussed our observational plan with family and caregivers    Total Time Spent:  I spent a total of 15 minutes face-to-face with Pavithra Wing during today's office visit.  Over 50% of this time  was spent counseling the patient and/or coordinating care regarding ET.    Taurus Myrick MD   of Medicine  [employer]

## 2018-03-21 NOTE — NURSING NOTE
"Oncology Rooming Note    March 21, 2018 2:40 PM   Pavithra Wing is a 23 year old female who presents for:    Chief Complaint   Patient presents with     Oncology Clinic Visit     8 month f/u Thrombocytosis     Initial Vitals: /84  Pulse 119  Temp 97.5  F (36.4  C) (Oral)  Resp 16  Ht 1.511 m (4' 11.49\")  Wt 45.5 kg (100 lb 3.2 oz)  LMP 01/20/2017  SpO2 100%  BMI 19.91 kg/m2 Estimated body mass index is 19.91 kg/(m^2) as calculated from the following:    Height as of this encounter: 1.511 m (4' 11.49\").    Weight as of this encounter: 45.5 kg (100 lb 3.2 oz). Body surface area is 1.38 meters squared.  No Pain (0) Comment: Data Unavailable   Patient's last menstrual period was 01/20/2017.  Allergies reviewed: Yes  Medications reviewed: Yes    Medications: Medication refills not needed today.  Pharmacy name entered into [facility]:    [id number] - [place], [place] - [address],  [address] [address] [place] [facility] - [place], [place] - [address] [facility]. - [place], [place] - [address]    Clinical concerns: none Dr Myrick was NOT notified.    10 minutes for nursing intake (face to face time)     TERESA GARRISON LPN            "

## 2018-06-20 DIAGNOSIS — H61.20 CERUMEN IMPACTION: Primary | ICD-10-CM

## 2018-07-24 ENCOUNTER — DOCUMENTATION ONLY (OUTPATIENT)
Dept: OTOLARYNGOLOGY | Facility: CLINIC | Age: 23
End: 2018-07-24

## 2018-07-24 NOTE — PROGRESS NOTES
EAR CLEANING AND EXAM scheduled    [place]  8/3/18   Dr Moyer    Home where she lives will schedule pre-op PE    : Lorie Atwood  [phone]    Surgery teaching needed / packet mailed to group home    Pavithra Wing  Attn:   [address] [place] [address]  [phone]    NO post op scheduled.    Belinda Gandara   ENT [place]-Op Coordinator  [phone]

## 2018-07-25 NOTE — PROGRESS NOTES
[facility]  [address] [address]  [phone]  Dept: [phone]    PRE-OP EVALUATION:  Today's date: 2018    Pavithra Wing (: 1995) presents for pre-operative evaluation assessment as requested by Dr. Moyer.  She requires evaluation and anesthesia risk assessment prior to undergoing surgery/procedure for treatment of Bilateral Ear Exam and Cleaning Under Anesthesia .    Fax number for surgical facility:  [facility]  Primary Physician: Ngozi Randall  Date of Surgery 8/3/18  Type of Anesthesia Anticipated: General    Patient has a Health Care Directive or Living Will:  NO    Preop Questions 2018   Who is doing your surgery? [facility]   What are you having done? ear cleaning   Date of Surgery/Procedure:    Facility or Hospital where procedure/surgery will be performed: [facility]   1.  Do you have a history of Heart attack, stroke, stent, coronary bypass surgery, or other heart surgery? No   2.  Do you ever have any pain or discomfort in your chest? No   3.  Do you have a history of  Heart Failure? No   4.   Are you troubled by shortness of breath when:  walking on a level surface, or up a slight hill, or at night? No   5.  Do you currently have a cold, bronchitis or other respiratory infection? No   6.  Do you have a cough, shortness of breath, or wheezing? No   7.  Do you sometimes get pains in the calves of your legs when you walk? No   8. Do you or anyone in your family have previous history of blood clots? No   9.  Do you or does anyone in your family have a serious bleeding problem such as prolonged bleeding following surgeries or cuts? No   10. Have you ever had problems with anemia or been told to take iron pills? No   11. Have you had any abnormal blood loss such as black, tarry or bloody stools, or abnormal vaginal bleeding? No   12. Have you ever had a blood transfusion?  No   13. Have you or any of your relatives ever had problems with anesthesia? UNKNOWN -    14. Do you have sleep apnea, excessive snoring or daytime drowsiness? No   15. Do you have any prosthetic heart valves? No   16. Do you have prosthetic joints? No   17. Is there any chance that you may be pregnant? No         Wants stool meds today per group home    HPI:     HPI related to upcoming procedure:   Here today for pre op she is ha been having drainage out of her left ear and appears to be earwax.  Dr. Moyer's office was contacted to have the ear wax removed and ears cleaned and this will be done in OR On 8/3/18.    She does take Xanax 1 hour prior to procedures.  This needs to done today.      See problem list for active medical problems.  Problems all longstanding and stable, except as noted/documented.  See ROS for pertinent symptoms related to these conditions.                                                                                                                                                          .    MEDICAL HISTORY:     Patient Active Problem List    Diagnosis Date Noted     Essential thrombocythemia (H) 05/04/2017     Priority: Medium     CALR mutation positive, low risk disease for thrombosis.  Observation alone       S/P hysterectomy 01/20/2017     Priority: Medium     Hyperlipidemia with target LDL less than 160 08/12/2014     Priority: Medium     Diagnosis updated by automated process. Provider to review and confirm.       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 Bertha Queen,     3/5/12 - Left message for parent will mail [facility] packet to parent, instructed to fill out care plan. Will follow up in 2 weeks.Donna BrownRN  DX V65.8 REPLACED WITH [id number] (04/08/2013)       Lactose intolerance 03/14/2011     Priority:  Medium     diarrhea with milk       Irregular menstrual bleeding 03/14/2011     Priority: Medium     Development delay: functional level of 2-4yo 06/17/2009     Priority: Medium     Blindness 06/17/2009     Priority: Medium     Impaired hearing 06/17/2009     Priority: Medium     Mastoiditis 06/17/2009     Priority: Medium     Constipation 06/17/2009     Priority: Medium      Past Medical History:   Diagnosis Date     Atopic dermatitis      Blindness      Constipation      Developmental delay     SEVERE     Dysgerminoma brain tumor, 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)      Hearing impaired      Heavy menstrual period      Hyperlipidaemia LDL goal < 160 8/12/2014     Irregular menstrual bleeding      Lactose intolerance 3/14/2011     Mastoiditis     (r)     PONV (postoperative nausea and vomiting)      Past Surgical History:   Procedure Laterality Date     ADENOIDECTOMY  4/24/2014    Procedure: ADENOIDECTOMY;  Surgeon: Omar Quiroz MD;  Location: [facility] OR     C ANESTH,EYE EXAM      Retinopathy of prematurity     DENTAL SURGERY       EXAM UNDER ANESTHESIA EAR(S)  4/27/2012    Procedure:EXAM UNDER ANESTHESIA EAR(S); Surgeon:OMAR QUIROZ; [facility] OR     EXAM UNDER ANESTHESIA EAR(S)  4/24/2014    Procedure: EXAM UNDER ANESTHESIA EAR(S);  Surgeon: Omar Quiroz MD;  [facility]     EXAM UNDER ANESTHESIA EAR(S) Bilateral 9/2/2015    Procedure: EXAM UNDER ANESTHESIA EAR(S);  Surgeon: Speedy Griffin MD;  [facility]     EXAM UNDER ANESTHESIA EAR(S) Bilateral 8/14/2017    Procedure: EXAM UNDER ANESTHESIA EAR(S);  Bilateral Ear Exam and Cerumenectomy under anesthesia;  Surgeon: Alexa Moyer MD;  [facility]     EXAM UNDER ANESTHESIA EYE(S)  4/24/2014    Procedure: EXAM UNDER ANESTHESIA EYE(S);  Surgeon: Romi Mary MD;  [facility]     EXAM UNDER ANESTHESIA NOSE  4/24/2014    Procedure: EXAM UNDER ANESTHESIA NOSE;  Surgeon: Omar Quiroz MD;  [facility]     EXAM UNDER ANESTHESIA, RESTORATIONS,  EXTRACTION(S) DENTAL COMPLEX, COMBINED  3/29/2012    Procedure:COMBINED EXAM UNDER ANESTHESIA, RESTORATIONS, EXTRACTION(S) DENTAL COMPLEX; Dental Exam, Radiograph, Dental Restorations, Periodontal Therapy, right ear mold impression.; Surgeon:PRITI VILLEGAS; [facility]     EXAM UNDER ANESTHESIA, RESTORATIONS, EXTRACTION(S) DENTAL COMPLEX, COMBINED N/A 1/10/2018    Procedure: COMBINED EXAM UNDER ANESTHESIA, RESTORATIONS, EXTRACTION(S) DENTAL COMPLEX;  Dental Exam,, Radiographs, Periodontal Therapy, Floride Varnish, Extraction of Teeth # 16, 17, 32;  Surgeon: Priti Villegas DDS;  [facility]     EXAM UNDER ANESTHESIA, RESTORATIONS, EXTRACTION(S) DENTAL, COMBINED  4/24/2014    Procedure: COMBINED EXAM UNDER ANESTHESIA, RESTORATIONS, EXTRACTION(S) DENTAL;  Surgeon: Yun Flores DDS;  [facility]     EXAM UNDER ANESTHESIA, RESTORATIONS, EXTRACTION(S) DENTAL, COMBINED N/A 9/2/2015    Procedure: COMBINED EXAM UNDER ANESTHESIA, RESTORATIONS, EXTRACTION(S) DENTAL;  Surgeon: Priti Villegas DDS;  [facility]     IMPLANT SOPHONO DEVICE  4/27/2012    Procedure:IMPLANT SOPHONO DEVICE; Left Sophono Implant   ; Surgeon:RANDY WARNER; [facility]     LAPAROSCOPIC HYSTERECTOMY TOTAL, SALPINGECTOMY BILATERAL Bilateral 1/20/2017    Procedure: LAPAROSCOPIC HYSTERECTOMY TOTAL, SALPINGECTOMY BILATERAL;  Surgeon: Rosy Ryan MD;  [facility]     ODONTECTOMY N/A 1/10/2018    Procedure: ODONTECTOMY;   Extraction of Teeth # 16, 17, 3;  Surgeon: Cezar Fischer DDS;  [facility]     PE TUBES       PICC INSERTION      removed 2/25/09     Current Outpatient Prescriptions   Medication Sig Dispense Refill     fexofenadine (ALLEGRA) 180 MG tablet        Fiber, Guar Gum, CHEW Take 2.5 g by mouth 2 times daily       acetaminophen (TYLENOL) 325 MG tablet Take 2 tablets (650 mg) by mouth every 4 hours as needed for mild pain 100 tablet 0     ALPRAZolam (XANAX) 0.5 MG tablet Take 1 tablet (0.5  mg) by mouth as needed for anxiety (for anxiety with blood draws) (Patient not taking: Reported on 3/21/2018) 10 tablet 0     CALCIUM 500/D 500-400 MG-UNIT CHEW        HYDROcodone-acetaminophen (NORCO) 5-325 MG per tablet Take 1 tablet by mouth every 6 hours as needed for pain (moderate to severe pain) (Patient not taking: Reported on 3/21/2018) 18 tablet 0     ibuprofen (ADVIL/MOTRIN) 600 MG tablet Take 1 tablet (600 mg) by mouth every 6 hours as needed for pain or fever (mild to moderate pain, or temperature greater than 102 F) 30 tablet 0     MULTI-VITAMIN OR TABS 1 DAILY       order for DME Equipment being ordered: orthotics 1 Device 0     senna-docusate (SENOKOT-S;PERICOLACE) 8.6-50 MG per tablet Take 1 tablet by mouth 2 times daily as needed for constipation (Patient not taking: Reported on 7/26/2018) 60 tablet 0     OTC products: no recent use of OTC ASA, NSAIDS or Steroids    Allergies   Allergen Reactions     Benzalkonium Chloride Rash      Latex Allergy: NO    Social History   Substance Use Topics     Smoking status: Never Smoker     Smokeless tobacco: Never Used     Alcohol use No     History   Drug Use No       REVIEW OF SYSTEMS:   CONSTITUTIONAL: NEGATIVE for fever, chills, change in weight  INTEGUMENTARY/SKIN: NEGATIVE for worrisome rashes, moles or lesions  EYES: NEGATIVE for vision changes or irritation  ENT/MOUTH: NEGATIVE for ear, mouth and throat problems  RESP: NEGATIVE for significant cough or SOB  BREAST: NEGATIVE for masses, tenderness or discharge  CV: NEGATIVE for chest pain, palpitations or peripheral edema  GI: she kuldeep been having issues with   : NEGATIVE for frequency, dysuria, or hematuria  MUSCULOSKELETAL: NEGATIVE for significant arthralgias or myalgia  NEURO: NEGATIVE for weakness, dizziness or paresthesias  ENDOCRINE: NEGATIVE for temperature intolerance, skin/hair changes  HEME: NEGATIVE for bleeding problems  PSYCHIATRIC: NEGATIVE for changes in mood or affect    EXAM:   BP  "127/75  Pulse 97  Temp 99.2  F (37.3  C) (Tympanic)  Ht 4' 11.25\" (1.505 m)  Wt 102 lb (46.3 kg)  LMP 01/20/2017  SpO2 98%  BMI 20.43 kg/m2    GENERAL APPEARANCE: healthy, alert and no distress     EYES: not examined     HENT: nose and mouth without ulcers or lesions, TM's pearly grey, normal light reflex left with lower canal erythematous and creamy drainage note and cerumen occluding right TM     RESP: lungs clear to auscultation - no rales, rhonchi or wheezes     CV: regular rates and rhythm, normal S1 S2, no S3 or S4 and no murmur, click or rub     ABDOMEN:  soft, nontender, no HSM or masses and bowel sounds normal     MS: extremities normal- no gross deformities noted, no evidence of inflammation in joints, FROM in all extremities.     SKIN: no suspicious lesions or rashes     NEURO: Normal strength and tone, sensory exam grossly normal, mentation intact and speech normal    DIAGNOSTICS:   No labs or EKG required for low risk surgery (cataract, skin procedure, breast biopsy, etc)    Recent Labs   Lab Test  03/14/18   1126  04/01/17   0821   01/21/17   0028   HGB  12.7  13.0   < >  10.3*   PLT  2287*  1651*   < >  1122*   NA   --    --    --   142   POTASSIUM   --    --    --   5.1   CR   --    --    --   0.56    < > = values in this interval not displayed.        IMPRESSION:   Reason for surgery/procedure: impacted cerumen  Diagnosis/reason for consult: impacted cerumen    The proposed surgical procedure is considered LOW risk.    REVISED CARDIAC RISK INDEX  The patient has the following serious cardiovascular risks for perioperative complications such as (MI, PE, VFib and 3  AV Block):  No serious cardiac risks  INTERPRETATION: 0 risks: Class I (very low risk - 0.4% complication rate)    The patient has the following additional risks for perioperative complications:  No identified additional risks    (Z01.818) Preop general physical exam  (primary encounter diagnosis)  (H61.21) Impacted cerumen of right " ear  (D47.3) Thrombocytosis (H)  Comment:   Plan: ALPRAZolam (XANAX) 0.5 MG tablet        Xanax prior to surgery and OK for surgery    (K59.00) Constipation, unspecified constipation type  Comment:   Plan: docusate sodium (COLACE) 100 MG tablet                (H60.392) Infective otitis externa, left  Comment:   Plan: ciprofloxacin (CETRAXAL) 0.2 % otic solution        Drops per Epic orders  Tylenol or Motrin as needed.  Wick out any fluid with a kleenex, then place drops.             RECOMMENDATIONS:     No risk factors      --Patient is to take all scheduled medications on the day of surgery EXCEPT for modifications listed below.    APPROVAL GIVEN to proceed with proposed procedure, without further diagnostic evaluation       Signed Electronically by: Ngozi Randall, CHRISTIAN, APRN CNP    Copy of this evaluation report is provided to requesting physician.    [facility] Preop Guidelines    Revised Cardiac Risk Index

## 2018-07-26 ENCOUNTER — TELEPHONE (OUTPATIENT)
Dept: PEDIATRICS | Facility: CLINIC | Age: 23
End: 2018-07-26

## 2018-07-26 ENCOUNTER — OFFICE VISIT (OUTPATIENT)
Dept: PEDIATRICS | Facility: CLINIC | Age: 23
End: 2018-07-26
Payer: COMMERCIAL

## 2018-07-26 VITALS
HEIGHT: 59 IN | BODY MASS INDEX: 20.56 KG/M2 | DIASTOLIC BLOOD PRESSURE: 75 MMHG | TEMPERATURE: 99.2 F | OXYGEN SATURATION: 98 % | WEIGHT: 102 LBS | HEART RATE: 97 BPM | SYSTOLIC BLOOD PRESSURE: 127 MMHG

## 2018-07-26 DIAGNOSIS — K59.00 CONSTIPATION, UNSPECIFIED CONSTIPATION TYPE: ICD-10-CM

## 2018-07-26 DIAGNOSIS — H61.21 IMPACTED CERUMEN OF RIGHT EAR: ICD-10-CM

## 2018-07-26 DIAGNOSIS — D75.839 THROMBOCYTOSIS: ICD-10-CM

## 2018-07-26 DIAGNOSIS — H60.392 INFECTIVE OTITIS EXTERNA, LEFT: ICD-10-CM

## 2018-07-26 DIAGNOSIS — H60.392 INFECTIVE OTITIS EXTERNA, LEFT: Primary | ICD-10-CM

## 2018-07-26 DIAGNOSIS — Z01.818 PREOP GENERAL PHYSICAL EXAM: Primary | ICD-10-CM

## 2018-07-26 PROCEDURE — 99214 OFFICE O/P EST MOD 30 MIN: CPT | Performed by: NURSE PRACTITIONER

## 2018-07-26 PROCEDURE — T1013 SIGN LANG/ORAL INTERPRETER: HCPCS | Mod: U3 | Performed by: NURSE PRACTITIONER

## 2018-07-26 RX ORDER — CIPROFLOXACIN 0.5 MG/.25ML
1 SOLUTION/ DROPS AURICULAR (OTIC) 2 TIMES DAILY
Qty: 3.5 ML | Refills: 0 | Status: ON HOLD | OUTPATIENT
Start: 2018-07-26 | End: 2018-08-03

## 2018-07-26 RX ORDER — NEOMYCIN SULFATE, POLYMYXIN B SULFATE, HYDROCORTISONE 3.5; 10000; 1 MG/ML; [USP'U]/ML; MG/ML
3 SOLUTION/ DROPS AURICULAR (OTIC) 4 TIMES DAILY
Qty: 5 ML | Refills: 0 | Status: ON HOLD | OUTPATIENT
Start: 2018-07-26 | End: 2018-08-03

## 2018-07-26 RX ORDER — ALPRAZOLAM 0.5 MG
0.5 TABLET ORAL PRN
Qty: 10 TABLET | Refills: 0 | Status: SHIPPED | OUTPATIENT
Start: 2018-07-26 | End: 2018-09-18

## 2018-07-26 RX ORDER — ASPIRIN 81 MG
100 TABLET, DELAYED RELEASE (ENTERIC COATED) ORAL DAILY
Qty: 60 TABLET | Refills: 1 | Status: SHIPPED | OUTPATIENT
Start: 2018-07-26 | End: 2018-09-11

## 2018-07-26 NOTE — MR AVS SNAPSHOT
After Visit Summary   7/26/2018    Pavithra Wing    MRN: [id number]           Patient Information     Date Of Birth          1995        Visit Information        Provider Department      7/26/2018 1:50 PM Jaquelin Higgins Nancy Novakoske, APRN [facility]        Today's Diagnoses     Preop general physical exam    -  1    Thrombocytosis (H)        Constipation, unspecified constipation type        Impacted cerumen of right ear        Infective otitis externa, left          Care Instructions      Before Your Surgery      Call your surgeon if there is any change in your health. This includes signs of a cold or flu (such as a sore throat, runny nose, cough, rash or fever).    Do not smoke, drink alcohol or take over the counter medicine (unless your surgeon or primary care doctor tells you to) for the 24 hours before and after surgery.    If you take prescribed drugs: Follow your doctor s orders about which medicines to take and which to stop until after surgery.    Eating and drinking prior to surgery: follow the instructions from your surgeon    Take a shower or bath the night before surgery. Use the soap your surgeon gave you to gently clean your skin. If you do not have soap from your surgeon, use your regular soap. Do not shave or scrub the surgery site.  Wear clean pajamas and have clean sheets on your bed.           Follow-ups after your visit        Your next 10 appointments already scheduled     Aug 03, 2018   Procedure with Alexa Moyer MD   [facility], [place], Same Day Surgery (--)    [facility] [facility] [address]   [phone]            Aug 20, 2018 10:00 AM CDT   [facility] with  [facility]   [facility] Draw ([facility])    [address]  [address]  [place] [address] [phone]            Sep 05, 2018  3:00 PM CDT   (Arrive by 2:45 PM)   Return Visit with Taurus Myrick,  "MD [facility] ([facility])    [address]  [address]  [facility] [address] [phone]              Who to contact     If you have questions or need follow up information about today's clinic visit or your schedule please contact [facility] directly at [phone].  Normal or non-critical lab and imaging results will be communicated to you by MyChart, letter or phone within 4 business days after the clinic has received the results. If you do not hear from us within 7 days, please contact the clinic through MyChart or phone. If you have a critical or abnormal lab result, we will notify you by phone as soon as possible.  Submit refill requests through [employer] or call your pharmacy and they will forward the refill request to us. Please allow 3 business days for your refill to be completed.          Additional Information About Your Visit        Care EveryWhere ID     This is your Care EveryWhere ID. This could be used by other organizations to access your [facility] medical records  [id number]        Your Vitals Were     Pulse Temperature Height Last Period Pulse Oximetry BMI (Body Mass Index)    97 99.2  F (37.3  C) (Tympanic) 4' 11.25\" (1.505 m) 01/20/2017 98% 20.43 kg/m2       Blood Pressure from Last 3 Encounters:   07/26/18 127/75   03/21/18 138/84   02/27/18 133/81    Weight from Last 3 Encounters:   07/26/18 102 lb (46.3 kg)   03/21/18 100 lb 3.2 oz (45.5 kg)   02/27/18 101 lb (45.8 kg)              Today, you had the following     No orders found for display         Today's Medication Changes          These changes are accurate as of 7/26/18  2:57 PM.  If you have any questions, ask your nurse or doctor.               Start taking these medicines.        Dose/Directions    ciprofloxacin 0.2 % otic solution   Commonly known as:  CETRAXAL   Used for:  Infective otitis externa, left   Started by:  Ngozi Randall, APRN CNP "        Dose:  1 Dropperette   Place 0.25 mLs (1 Dropperette) Into the left ear 2 times daily for 7 days   Quantity:  3.5 mL   Refills:  0       docusate sodium 100 MG tablet   Commonly known as:  COLACE   Used for:  Constipation, unspecified constipation type   Started by:  Ngozi Randall APRN CNP        Dose:  100 mg   Take 100 mg by mouth daily   Quantity:  60 tablet   Refills:  1            Where to get your medicines      These medications were sent to [facility][1] [address] [place], [place] [address]. [address] [facility] [address]     Phone:  [phone]     ciprofloxacin 0.2 % otic solution    docusate sodium 100 MG tablet         Some of these will need a paper prescription and others can be bought over the counter.  Ask your nurse if you have questions.     Bring a paper prescription for each of these medications     ALPRAZolam 0.5 MG tablet                Primary Care Provider Office Phone # Fax #    SEB Lieberman [phone] [address] [address] [facility] [address]        Equal Access to Services     [facility] Center[facility] : Hadii jesse ku hadasho Soomaali, waaxda luqadaha, qaybta kaalmada adeegyada, mari martinez . So [facility] [phone].    ATENCIÓN: Si habla español, tiene a doe disposición servicios gratuitos de asistencia lingüística. [facility] [phone].    We comply with applicable federal civil rights laws and [place] laws. We do not discriminate on the basis of race, color, national origin, age, disability, sex, sexual orientation, or gender identity.            Thank you!     Thank you for choosing [facility] [place] [facility]  for your care. Our goal is always to provide you with excellent care. Hearing back from our patients is one way we can continue to improve our services. Please take a few minutes to complete the written survey that you may receive in the mail after your visit with us. Thank  you!             Your Updated Medication List - Protect others around you: Learn how to safely use, store and throw away your medicines at www.disposemymeds.org.          This list is accurate as of 7/26/18  2:57 PM.  Always use your most recent med list.                   Brand Name Dispense Instructions for use Diagnosis    acetaminophen 325 MG tablet    TYLENOL    100 tablet    Take 2 tablets (650 mg) by mouth every 4 hours as needed for mild pain    S/P hysterectomy       ALPRAZolam 0.5 MG tablet    XANAX    10 tablet    Take 1 tablet (0.5 mg) by mouth as needed for anxiety (for anxiety with blood draws)    Thrombocytosis (H)       calcium 500/D 500-400 MG-UNIT Chew   Generic drug:  Calcium Carb-Cholecalciferol           ciprofloxacin 0.2 % otic solution    CETRAXAL    3.5 mL    Place 0.25 mLs (1 Dropperette) Into the left ear 2 times daily for 7 days    Infective otitis externa, left       docusate sodium 100 MG tablet    COLACE    60 tablet    Take 100 mg by mouth daily    Constipation, unspecified constipation type       fexofenadine 180 MG tablet    ALLEGRA          Fiber (Guar Gum) Chew      Take 2.5 g by mouth 2 times daily        HYDROcodone-acetaminophen 5-325 MG per tablet    NORCO    18 tablet    Take 1 tablet by mouth every 6 hours as needed for pain (moderate to severe pain)    Dental impaction       ibuprofen 600 MG tablet    ADVIL/MOTRIN    30 tablet    Take 1 tablet (600 mg) by mouth every 6 hours as needed for pain or fever (mild to moderate pain, or temperature greater than 102 F)    Dental impaction       Multi-vitamin Tabs tablet   Generic drug:  multivitamin, therapeutic with minerals      1 DAILY        order for DME     1 Device    Equipment being ordered: orthotics    Development delay       senna-docusate 8.6-50 MG per tablet    SENOKOT-S;PERICOLACE    60 tablet    Take 1 tablet by mouth 2 times daily as needed for constipation    S/P hysterectomy

## 2018-07-26 NOTE — TELEPHONE ENCOUNTER
Yisel states the patient was prescribed ciprofloxacin and it is not covered by her insurance and she would like to know if you will prescribe something else.    Thank you.

## 2018-07-26 NOTE — TELEPHONE ENCOUNTER
Triage,    New orders sent can you give the home she is at a VO to give her drops..    neomycin-polymyxin-hydrocortisone (CORTISPORIN) otic solution 5 mL 0 7/26/2018 8/2/2018 --      Sig - Route: Place 3 drops Into the left ear 4 times daily for 7 days - Left Ear     Thanks,    Ngozi Randall, APRN, CNP

## 2018-07-26 NOTE — TELEPHONE ENCOUNTER
RN attempted to reach [facility] staff regarding message below. Group home staff working this evening at [facility] request that this message be given to the  who will be available at [phone] at 2pm tomorrow.    RN relayed provider message below to [facility] staff and stated that the orders have been sent to the pharmacy today. FYI to [facility] evening staff in case order is delivered tonight or if they need to contact pharmacy tonight.    Claudia Calix RN

## 2018-07-27 ENCOUNTER — CARE COORDINATION (OUTPATIENT)
Dept: OTOLARYNGOLOGY | Facility: CLINIC | Age: 23
End: 2018-07-27

## 2018-07-27 NOTE — PROGRESS NOTES
Teaching Flowsheet - ENT   Relevant Diagnosis: ear wax  Teaching Topic: bilateral ear cleaning and exam under anesthesia   Person(s) involved in teaching: Via phone.   Lorie Rai [phone] care facility      Motivation Level:  Asks Questions:   Yes  Eager to Learn:   Yes  Cooperative:   Yes  Receptive (willing/able to accept information):   Yes  Comments: Reviewed pre-op H and P completed 7-26-18,  NPO prior to  surgery,  pre-op scrubmailed Reviewed post-op  cares including, activity and pain.     Patient demonstrates understanding of the following:  Reason for the appointment, diagnosis and treatment plan:   Yes  Knowledge of proper use of medications and conditions for which they are ordered (with special attention to potential side effects or drug interactions):  stop aspirin products 1 week before surgery Yes  Which situations necessitate calling provider and whom to contact:   Yes  Nutritional needs and diet plan:   Yes      How and/when to access community resources:   Yes     Instructional Materials Used/Given: pre- op booklet  and verbal  Instruction.  Pt legal guardian (state appointed) Chloe Wheeler [phone]- message left on [employer] voice mail, will need to have consent signed prior to surgery. Can do via phone will need to arrange time- awaiting call back from [employer].  Jacy Vera RN  ENT Care Coordinator   Otology  [phone]  7/27/2018 3:42 PM

## 2018-07-27 NOTE — TELEPHONE ENCOUNTER
Group home notified.  To reach them call [phone] this is both the fax machine and phone.  Manger's name is Lorie Atwood.  Shea Medina R.N.

## 2018-08-02 ENCOUNTER — CARE COORDINATION (OUTPATIENT)
Dept: OTOLARYNGOLOGY | Facility: CLINIC | Age: 23
End: 2018-08-02

## 2018-08-02 ENCOUNTER — ANESTHESIA EVENT (OUTPATIENT)
Dept: SURGERY | Facility: CLINIC | Age: 23
End: 2018-08-02
Payer: COMMERCIAL

## 2018-08-02 NOTE — PROGRESS NOTES
Phone consent for 8-3-18 surgical procedure completed. Fax to pre admission nursing.  Jacy Vera RN  ENT Care Coordinator   Otology  [phone]  8/2/2018 4:12 PM

## 2018-08-03 ENCOUNTER — ANESTHESIA (OUTPATIENT)
Dept: SURGERY | Facility: CLINIC | Age: 23
End: 2018-08-03
Payer: COMMERCIAL

## 2018-08-03 ENCOUNTER — OFFICE VISIT (OUTPATIENT)
Dept: INTERPRETER SERVICES | Facility: CLINIC | Age: 23
End: 2018-08-03
Payer: COMMERCIAL

## 2018-08-03 ENCOUNTER — HOSPITAL ENCOUNTER (OUTPATIENT)
Facility: CLINIC | Age: 23
Discharge: NURSING, REHAB OR LONG-TERM CARE | End: 2018-08-03
Attending: OTOLARYNGOLOGY | Admitting: OTOLARYNGOLOGY
Payer: COMMERCIAL

## 2018-08-03 ENCOUNTER — SURGERY (OUTPATIENT)
Age: 23
End: 2018-08-03

## 2018-08-03 VITALS
BODY MASS INDEX: 20.09 KG/M2 | OXYGEN SATURATION: 100 % | SYSTOLIC BLOOD PRESSURE: 103 MMHG | TEMPERATURE: 97.5 F | HEIGHT: 59 IN | DIASTOLIC BLOOD PRESSURE: 83 MMHG | HEART RATE: 93 BPM | RESPIRATION RATE: 12 BRPM | WEIGHT: 99.65 LBS

## 2018-08-03 DIAGNOSIS — H70.92 INFECTION OF LEFT MASTOID BOWL: Primary | ICD-10-CM

## 2018-08-03 PROCEDURE — 40000170 ZZH STATISTIC PRE-PROCEDURE ASSESSMENT II: Performed by: OTOLARYNGOLOGY

## 2018-08-03 PROCEDURE — 36000051 ZZH SURGERY LEVEL 2 1ST 30 MIN - UMMC: Performed by: OTOLARYNGOLOGY

## 2018-08-03 PROCEDURE — 71000014 ZZH RECOVERY PHASE 1 LEVEL 2 FIRST HR: Performed by: OTOLARYNGOLOGY

## 2018-08-03 PROCEDURE — 37000009 ZZH ANESTHESIA TECHNICAL FEE, EACH ADDTL 15 MIN: Performed by: OTOLARYNGOLOGY

## 2018-08-03 PROCEDURE — 25000128 H RX IP 250 OP 636: Performed by: NURSE ANESTHETIST, CERTIFIED REGISTERED

## 2018-08-03 PROCEDURE — 27210794 ZZH OR GENERAL SUPPLY STERILE: Performed by: OTOLARYNGOLOGY

## 2018-08-03 PROCEDURE — T1013 SIGN LANG/ORAL INTERPRETER: HCPCS | Mod: U3

## 2018-08-03 PROCEDURE — 37000008 ZZH ANESTHESIA TECHNICAL FEE, 1ST 30 MIN: Performed by: OTOLARYNGOLOGY

## 2018-08-03 PROCEDURE — 25000566 ZZH SEVOFLURANE, EA 15 MIN: Performed by: OTOLARYNGOLOGY

## 2018-08-03 PROCEDURE — 71000027 ZZH RECOVERY PHASE 2 EACH 15 MINS: Performed by: OTOLARYNGOLOGY

## 2018-08-03 RX ORDER — KETOROLAC TROMETHAMINE 30 MG/ML
INJECTION, SOLUTION INTRAMUSCULAR; INTRAVENOUS PRN
Status: DISCONTINUED | OUTPATIENT
Start: 2018-08-03 | End: 2018-08-03

## 2018-08-03 RX ORDER — ACETAMINOPHEN 325 MG/1
650 TABLET ORAL
Status: DISCONTINUED | OUTPATIENT
Start: 2018-08-03 | End: 2018-08-03 | Stop reason: HOSPADM

## 2018-08-03 RX ORDER — CIPROFLOXACIN AND DEXAMETHASONE 3; 1 MG/ML; MG/ML
5 SUSPENSION/ DROPS AURICULAR (OTIC) 2 TIMES DAILY
Qty: 7 ML | Refills: 0 | Status: SHIPPED | OUTPATIENT
Start: 2018-08-03 | End: 2018-08-17

## 2018-08-03 RX ORDER — MEPERIDINE HYDROCHLORIDE 50 MG/ML
12.5 INJECTION INTRAMUSCULAR; INTRAVENOUS; SUBCUTANEOUS
Status: DISCONTINUED | OUTPATIENT
Start: 2018-08-03 | End: 2018-08-03 | Stop reason: HOSPADM

## 2018-08-03 RX ORDER — SODIUM CHLORIDE, SODIUM LACTATE, POTASSIUM CHLORIDE, CALCIUM CHLORIDE 600; 310; 30; 20 MG/100ML; MG/100ML; MG/100ML; MG/100ML
INJECTION, SOLUTION INTRAVENOUS CONTINUOUS
Status: DISCONTINUED | OUTPATIENT
Start: 2018-08-03 | End: 2018-08-03 | Stop reason: HOSPADM

## 2018-08-03 RX ORDER — ONDANSETRON 2 MG/ML
INJECTION INTRAMUSCULAR; INTRAVENOUS PRN
Status: DISCONTINUED | OUTPATIENT
Start: 2018-08-03 | End: 2018-08-03

## 2018-08-03 RX ORDER — ONDANSETRON 2 MG/ML
4 INJECTION INTRAMUSCULAR; INTRAVENOUS EVERY 30 MIN PRN
Status: DISCONTINUED | OUTPATIENT
Start: 2018-08-03 | End: 2018-08-03 | Stop reason: HOSPADM

## 2018-08-03 RX ORDER — NALOXONE HYDROCHLORIDE 0.4 MG/ML
.1-.4 INJECTION, SOLUTION INTRAMUSCULAR; INTRAVENOUS; SUBCUTANEOUS
Status: DISCONTINUED | OUTPATIENT
Start: 2018-08-03 | End: 2018-08-03 | Stop reason: HOSPADM

## 2018-08-03 RX ORDER — ONDANSETRON 4 MG/1
4 TABLET, ORALLY DISINTEGRATING ORAL EVERY 30 MIN PRN
Status: DISCONTINUED | OUTPATIENT
Start: 2018-08-03 | End: 2018-08-03 | Stop reason: HOSPADM

## 2018-08-03 RX ORDER — FENTANYL CITRATE 50 UG/ML
INJECTION, SOLUTION INTRAMUSCULAR; INTRAVENOUS PRN
Status: DISCONTINUED | OUTPATIENT
Start: 2018-08-03 | End: 2018-08-03

## 2018-08-03 RX ADMIN — KETOROLAC TROMETHAMINE 20 MG: 30 INJECTION, SOLUTION INTRAMUSCULAR at 10:07

## 2018-08-03 RX ADMIN — ONDANSETRON 4 MG: 2 INJECTION INTRAMUSCULAR; INTRAVENOUS at 10:25

## 2018-08-03 RX ADMIN — FENTANYL CITRATE 25 MCG: 50 INJECTION, SOLUTION INTRAMUSCULAR; INTRAVENOUS at 10:07

## 2018-08-03 NOTE — DISCHARGE INSTRUCTIONS
"1. Resume your home medications. Start Ciprodex 5 drops twice a day in the left ear for 2 weeks.    2. Wound care  * Change the cotton ball in your ear as needed for drainage.  It's normal to expect some drainage from your ear for the first few days after surgery.    3. Use a cotton ball coated with vasoline to keep water out of ear canals.    4. Please call MD or come to the ED for shortness of breath, trouble breathing, inability to tolerate liquids, intractable dizziness, or signs of infection such as fevers or redness.      Call the [phone] clinic number if you have any questions and concerns during the day and call [phone] at night and ask for \"ENT resident on call\".     5. Follow up with Dr. Moyer in 3-4 weeks.     Same-Day Surgery   Adult Discharge Orders & Instructions     For 24 hours after surgery:  1. Get plenty of rest.  A responsible adult must stay with you for at least 24 hours after you leave the hospital.   2. Pain medication can slow your reflexes. Do not drive or use heavy equipment.  If you have weakness or tingling, don't drive or use heavy equipment until this feeling goes away.  3. Mixing alcohol and pain medication can cause dizziness and slow your breathing. It can even be fatal. Do not drink alcohol while taking pain medication.  4. Avoid strenuous or risky activities.  Ask for help when climbing stairs.   5. You may feel lightheaded.  If so, sit for a few minutes before standing.  Have someone help you get up.   6. If you have nausea (feel sick to your stomach), drink only clear liquids such as apple juice, ginger ale, broth or 7-Up.  Rest may also help.  Be sure to drink enough fluids.  Move to a regular diet as you feel able. Take pain medications with a small amount of solid food, such as toast or crackers, to avoid nausea.   7. A slight fever is normal. Call the doctor if your fever is over 100 F (37.7 C) (taken under the tongue) or lasts longer than 24 hours.  8. You may " have a dry mouth, muscle aches, trouble sleeping or a sore throat.  These symptoms should go away after 24 hours.  9. Do not make important or legal decisions.   Pain Management:      1. Take pain medication (if prescribed) for pain as directed by your physician.        2. WARNING: If the pain medication you have been prescribed contains Tylenol  (acetaminophen), DO NOT take additional doses of Tylenol (acetaminophen).     Call your doctor for any of the followin.  Signs of infection (fever, growing tenderness at the surgery site, severe pain, a large amount of drainage or bleeding, foul-smelling drainage, redness, swelling).    2.  It has been over 8 to 10 hours since surgery and you are still not able to urinate (pee).    3.  Headache for over 24 hours.    4.  Numbness, tingling or weakness the day after surgery (if you had spinal anesthesia).  To contact a doctor, call Dr Moyer or:      [phone] and ask for the Resident On Call for:         ENT (answered 24 hours a day)      Emergency Department:  [place]: [phone]  [place] [facility]: [phone]

## 2018-08-03 NOTE — OR NURSING
Ear drops that were ordered have Benzalkonium as an additive (pt has allergy to this when taken orally, makes her itchy). Patient has been on 2 ear drops very recently, both have this same additive in them, and she has tolerated with no issue. Group home staff and patients father all agree they would like to start the new ears drops.

## 2018-08-03 NOTE — ANESTHESIA PREPROCEDURE EVALUATION
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)      Hearing impaired      Heavy menstrual period      Hyperlipidaemia LDL goal < 160 8/12/2014     Irregular menstrual bleeding      Lactose intolerance 3/14/2011     Mastoiditis     (r)     PONV (postoperative nausea and vomiting)        Past Surgical History:   Procedure Laterality Date     ADENOIDECTOMY  4/24/2014    Procedure: ADENOIDECTOMY;  Surgeon: Omar Quiroz MD;  [facility]: [facility]     C ANESTH,EYE EXAM      Retinopathy of prematurity     DENTAL SURGERY       EXAM UNDER ANESTHESIA EAR(S)  4/27/2012    Procedure:EXAM UNDER ANESTHESIA EAR(S); Surgeon:OMAR QUIROZ; [facility]     EXAM UNDER ANESTHESIA EAR(S)  4/24/2014    Procedure: EXAM UNDER ANESTHESIA EAR(S);  Surgeon: Omar Quiroz MD;  [facility]     EXAM UNDER ANESTHESIA EAR(S) Bilateral 9/2/2015    Procedure: EXAM UNDER ANESTHESIA EAR(S);  Surgeon: Speedy Griffin MD;  [facility]     EXAM UNDER ANESTHESIA EAR(S) Bilateral 8/14/2017    Procedure: EXAM UNDER ANESTHESIA EAR(S);  Bilateral Ear Exam and Cerumenectomy under anesthesia;  Surgeon: Alexa Moyer MD;  [facility]     EXAM UNDER ANESTHESIA EYE(S)  4/24/2014    Procedure: EXAM UNDER ANESTHESIA EYE(S);  Surgeon: Romi Mary MD;  [facility]     EXAM UNDER ANESTHESIA NOSE  4/24/2014    Procedure: EXAM UNDER ANESTHESIA NOSE;  Surgeon: Omar Quiroz MD;  [facility]     EXAM UNDER ANESTHESIA, RESTORATIONS, EXTRACTION(S) DENTAL COMPLEX, COMBINED  3/29/2012    Procedure:COMBINED EXAM UNDER ANESTHESIA, RESTORATIONS, EXTRACTION(S) DENTAL COMPLEX; Dental Exam, Radiograph, Dental Restorations, Periodontal Therapy, right ear  mold impression.; Surgeon:PRITI VILLEGAS; [facility]     EXAM UNDER ANESTHESIA, RESTORATIONS, EXTRACTION(S) DENTAL COMPLEX, COMBINED N/A 1/10/2018    Procedure: COMBINED EXAM UNDER ANESTHESIA, RESTORATIONS, EXTRACTION(S) DENTAL COMPLEX;  Dental Exam,, Radiographs, Periodontal Therapy, Floride Varnish, Extraction of Teeth # 16, 17, 32;  Surgeon: Priti Villegas DDS;  [facility]     EXAM UNDER ANESTHESIA, RESTORATIONS, EXTRACTION(S) DENTAL, COMBINED  4/24/2014    Procedure: COMBINED EXAM UNDER ANESTHESIA, RESTORATIONS, EXTRACTION(S) DENTAL;  Surgeon: Yun Flores DDS;  [facility]     EXAM UNDER ANESTHESIA, RESTORATIONS, EXTRACTION(S) DENTAL, COMBINED N/A 9/2/2015    Procedure: COMBINED EXAM UNDER ANESTHESIA, RESTORATIONS, EXTRACTION(S) DENTAL;  Surgeon: Priti Villegas DDS;  [facility]     IMPLANT SOPHONO DEVICE  4/27/2012    Procedure:IMPLANT SOPHONO DEVICE; Left Sophono Implant   ; Surgeon:RANDY WARNER; [facility]     LAPAROSCOPIC HYSTERECTOMY TOTAL, SALPINGECTOMY BILATERAL Bilateral 1/20/2017    Procedure: LAPAROSCOPIC HYSTERECTOMY TOTAL, SALPINGECTOMY BILATERAL;  Surgeon: Rosy Ryan MD;  [facility]     ODONTECTOMY N/A 1/10/2018    Procedure: ODONTECTOMY;   Extraction of Teeth # 16, 17, 3;  Surgeon: Cezar Fischer DDS;  [facility]     PE TUBES       PICC INSERTION      removed 2/25/09           Allergies:   Allergies   Allergen Reactions     Benzalkonium Chloride Rash       Meds:   No prescriptions prior to admission.       Current Outpatient Prescriptions   Medication Sig Dispense Refill     acetaminophen (TYLENOL) 325 MG tablet Take 2 tablets (650 mg) by mouth every 4 hours as needed for mild pain 100 tablet 0     ALPRAZolam (XANAX) 0.5 MG tablet Take 1 tablet (0.5 mg) by mouth as needed for anxiety (for anxiety with blood draws) 10 tablet 0     CALCIUM 500/D 500-400 MG-UNIT CHEW        docusate sodium (COLACE) 100 MG tablet Take 100 mg by mouth  daily 60 tablet 1     fexofenadine (ALLEGRA) 180 MG tablet daily        Fiber, Guar Gum, CHEW Take 2.5 g by mouth 2 times daily       ibuprofen (ADVIL/MOTRIN) 600 MG tablet Take 1 tablet (600 mg) by mouth every 6 hours as needed for pain or fever (mild to moderate pain, or temperature greater than 102 F) 30 tablet 0     MULTI-VITAMIN OR TABS 1 DAILY       HYDROcodone-acetaminophen (NORCO) 5-325 MG per tablet Take 1 tablet by mouth every 6 hours as needed for pain (moderate to severe pain) (Patient not taking: Reported on 3/21/2018) 18 tablet 0     order for DME Equipment being ordered: orthotics 1 Device 0       Physical Exam:  VS: T 97.2, P Data Unavailable, /72, R 14, SpO2  .  Weight 47 Kg      BMP:  Recent Labs   Lab Test 11/26/08   GLC  Negative     LFTs:   No results for input(s): PROTTOTAL, ALBUMIN, BILITOTAL, ALKPHOS, AST, ALT, BILIDIRECT in the last 45598 hours.  CBC:   Recent Labs   Lab Test  02/03/10   1020  01/26/09   1315   WBC   --   10.3   RBC   --   4.98   HGB  11.6*  9.8*   HCT   --   32.0*   MCV   --   64*   MCH   --   19.7*   MCHC   --   30.6*   RDW   --   17.5*   PLT   --   337     Coags:  No results for input(s): INR, PTT, FIBR in the last 19671 hours.            Anesthesia Evaluation     . Pt has had prior anesthetic. Type: General    History of anesthetic complications   - PONV        ROS/MED HX    ENT/Pulmonary:  - neg pulmonary ROS     Neurologic:     (+)other neuro    Spinal cord injury: Severe develomental delay, blindeness.   Cardiovascular:  - neg cardiovascular ROS       METS/Exercise Tolerance:  3 - Able to walk 1-2 blocks without stopping   Hematologic:  - neg hematologic  ROS       Musculoskeletal:  - neg musculoskeletal ROS       GI/Hepatic:  - neg GI/hepatic ROS       Renal/Genitourinary:  - ROS Renal section negative       Endo:  - neg endo ROS       Psychiatric:  - neg psychiatric ROS       Infectious Disease:  - neg infectious disease ROS       Malignancy:      - no  malignancy   Other: Comment: Full assistance for ADL   (+) No chance of pregnancy other significant disability Blind, Deaf, Developmental delay and Other (comment)                   Physical Exam  Normal systems: cardiovascular and pulmonary    Airway   Mallampati: III  TM distance: >3 FB  Neck ROM: full  Comment: Patient does not cooperate in full for airway exam      Dental   Comment: Several molars missing    Cardiovascular   Rhythm and rate: regular and normal      Pulmonary    breath sounds clear to auscultation                        Anesthesia Plan      History & Physical Review  History and physical reviewed and following examination; no interval change.    ASA Status:  2 .    NPO Status:  > 6 hours    Plan for MAC with Inhalation induction. Maintenance will be Inhalation.           Postoperative Care      Consents  Anesthetic plan, risks, benefits and alternatives discussed with:  Parent (Mother and/or Father) and legal guardian..                          .

## 2018-08-03 NOTE — IP AVS SNAPSHOT
[facility]    [address] [address]    Phone:  [phone]                                       After Visit Summary   8/3/2018    Pavithra Wing    MRN: [id number]           After Visit Summary Signature Page     I have received my discharge instructions, and my questions have been answered. I have discussed any challenges I see with this plan with the nurse or doctor.    ..........................................................................................................................................  Patient/Patient Representative Signature      ..........................................................................................................................................  Patient Representative Print Name and Relationship to Patient    ..................................................               ................................................  Date                                            Time    ..........................................................................................................................................  Reviewed by Signature/Title    ...................................................              ..............................................  Date                                                            Time

## 2018-08-03 NOTE — IP AVS SNAPSHOT
MRN:[id number]                      After Visit Summary   8/3/2018    Pavithra Wing    MRN: [id number]           Thank you!     Thank you for choosing [facility] for your care. Our goal is always to provide you with excellent care. Hearing back from our patients is one way we can continue to improve our services. Please take a few minutes to complete the written survey that you may receive in the mail after you visit with us. Thank you!        Patient Information     Date Of Birth          1995        About your hospital stay     You were admitted on:  August 3, 2018 You last received care in the:   PACU    You were discharged on:  August 3, 2018       Who to Call     For medical emergencies, please call 911.  For non-urgent questions about your medical care, please call your primary care provider or clinic, [phone]  For questions related to your surgery, please call your surgery clinic        Attending Provider     Provider Alexa Mace MD Otolaryngology       Primary Care Provider Office Phone # Fax #    [facility] [phone]      Your next 10 appointments already scheduled     Aug 20, 2018 10:00 AM CDT   [facility] Draw with  [facility]RAW   [facility] ([facility])    [address] [place] [address]  [address] [place] [id number] [phone]            Sep 05, 2018  3:00 PM CDT   (Arrive by 2:45 PM)   Return Visit with Taurus Myrick MD   [facility] ([facility])    [address] [place] [address]  [address] [place] [id number] [phone]              Further instructions from your care team       1. Resume your home medications. Start Ciprodex 5 drops twice a day in the left ear for 2 weeks.    2. Wound care  * Change the cotton ball in your ear as needed for drainage.  It's normal to expect some drainage from your ear for the  "first few days after surgery.    3. Use a cotton ball coated with vasoline to keep water out of ear canals.    4. Please call MD or come to the ED for shortness of breath, trouble breathing, inability to tolerate liquids, intractable dizziness, or signs of infection such as fevers or redness.      Call the [phone] clinic number if you have any questions and concerns during the day and call [phone] at night and ask for \"ENT resident on call\".     5. Follow up with Dr. Moyer in 3-4 weeks.     Same-Day Surgery   Adult Discharge Orders & Instructions     For 24 hours after surgery:  1. Get plenty of rest.  A responsible adult must stay with you for at least 24 hours after you leave the hospital.   2. Pain medication can slow your reflexes. Do not drive or use heavy equipment.  If you have weakness or tingling, don't drive or use heavy equipment until this feeling goes away.  3. Mixing alcohol and pain medication can cause dizziness and slow your breathing. It can even be fatal. Do not drink alcohol while taking pain medication.  4. Avoid strenuous or risky activities.  Ask for help when climbing stairs.   5. You may feel lightheaded.  If so, sit for a few minutes before standing.  Have someone help you get up.   6. If you have nausea (feel sick to your stomach), drink only clear liquids such as apple juice, ginger ale, broth or 7-Up.  Rest may also help.  Be sure to drink enough fluids.  Move to a regular diet as you feel able. Take pain medications with a small amount of solid food, such as toast or crackers, to avoid nausea.   7. A slight fever is normal. Call the doctor if your fever is over 100 F (37.7 C) (taken under the tongue) or lasts longer than 24 hours.  8. You may have a dry mouth, muscle aches, trouble sleeping or a sore throat.  These symptoms should go away after 24 hours.  9. Do not make important or legal decisions.   Pain Management:      1. Take pain medication (if prescribed) for pain as " "directed by your physician.        2. WARNING: If the pain medication you have been prescribed contains Tylenol  (acetaminophen), DO NOT take additional doses of Tylenol (acetaminophen).     Call your doctor for any of the followin.  Signs of infection (fever, growing tenderness at the surgery site, severe pain, a large amount of drainage or bleeding, foul-smelling drainage, redness, swelling).    2.  It has been over 8 to 10 hours since surgery and you are still not able to urinate (pee).    3.  Headache for over 24 hours.    4.  Numbness, tingling or weakness the day after surgery (if you had spinal anesthesia).  To contact a doctor, call Dr Moyer or:      [phone] and ask for the Resident On Call for:         ENT (answered 24 hours a day)      Emergency Department:  [facility]: [phone]  [facility]: [phone]                     Pending Results     No orders found from 2018 to 2018.            Admission Information     Date & Time Provider Department Dept. Phone    8/3/2018 Alexa Moyer MD  PACU [phone]      Your Vitals Were     Blood Pressure Pulse Temperature Respirations Height Weight    103/66 93 97.7  F (36.5  C) (Axillary) 15 1.499 m (4' 11\") 45.2 kg (99 lb 10.4 oz)    Last Period Pulse Oximetry BMI (Body Mass Index)             2017 100% 20.13 kg/m2         Care EveryWhere ID     This is your Care EveryWhere ID. This could be used by other organizations to access your [facility] medical records  [id number]        Equal Access to Services     [facility] GLENIS AH: Hadphil lowery Sonabor, waaxda luqadaha, qaybta kaalmada adeegyamindy, mari valentin. So [facility] [phone].    ATENCIÓN: Si habla español, tiene a doe disposición servicios gratuitos de asistencia lingüística. Llame al [phone].    We comply with applicable federal civil rights laws and [place] laws. We do not discriminate on the basis of " race, color, national origin, age, disability, sex, sexual orientation, or gender identity.               Review of your medicines      UNREVIEWED medicines. Ask your doctor about these medicines        Dose / Directions    calcium 500/D 500-400 MG-UNIT Chew   Generic drug:  Calcium Carb-Cholecalciferol        Refills:  0         START taking        Dose / Directions    ciprofloxacin-dexamethasone otic suspension   Commonly known as:  CIPRODEX   Used for:  Infection of left mastoid bowl        Dose:  5 drop   Place 5 drops Into the left ear 2 times daily for 14 days   Quantity:  7 mL   Refills:  0         CONTINUE these medicines which have NOT CHANGED        Dose / Directions    acetaminophen 325 MG tablet   Commonly known as:  TYLENOL   Used for:  S/P hysterectomy        Dose:  650 mg   Take 2 tablets (650 mg) by mouth every 4 hours as needed for mild pain   Quantity:  100 tablet   Refills:  0       ALPRAZolam 0.5 MG tablet   Commonly known as:  XANAX   Used for:  Thrombocytosis (H)        Dose:  0.5 mg   Take 1 tablet (0.5 mg) by mouth as needed for anxiety (for anxiety with blood draws)   Quantity:  10 tablet   Refills:  0       docusate sodium 100 MG tablet   Commonly known as:  COLACE   Used for:  Constipation, unspecified constipation type        Dose:  100 mg   Take 100 mg by mouth daily   Quantity:  60 tablet   Refills:  1       fexofenadine 180 MG tablet   Commonly known as:  ALLEGRA        daily   Refills:  0       Fiber (Guar Gum) Chew        Dose:  2.5 g   Take 2.5 g by mouth 2 times daily   Refills:  0       HYDROcodone-acetaminophen 5-325 MG per tablet   Commonly known as:  NORCO   Used for:  Dental impaction        Dose:  1 tablet   Take 1 tablet by mouth every 6 hours as needed for pain (moderate to severe pain)   Quantity:  18 tablet   Refills:  0       ibuprofen 600 MG tablet   Commonly known as:  ADVIL/MOTRIN   Used for:  Dental impaction        Dose:  600 mg   Take 1 tablet (600 mg) by mouth  every 6 hours as needed for pain or fever (mild to moderate pain, or temperature greater than 102 F)   Quantity:  30 tablet   Refills:  0       Multi-vitamin Tabs tablet   Generic drug:  multivitamin, therapeutic with minerals        1 DAILY   Refills:  0       order for DME   Used for:  Development delay        Equipment being ordered: orthotics   Quantity:  1 Device   Refills:  0         STOP taking     ciprofloxacin 0.2 % otic solution   Commonly known as:  CETRAXAL           neomycin-polymyxin-hydrocortisone otic solution   Commonly known as:  CORTISPORIN                Where to get your medicines      These medications were sent to [facility] [place], [place] - [address]  [address] [facility], [facility] [address]     Phone:  [phone]     ciprofloxacin-dexamethasone otic suspension                Protect others around you: Learn how to safely use, store and throw away your medicines at www.disposemymeds.org.             Medication List: This is a list of all your medications and when to take them. Check marks below indicate your daily home schedule. Keep this list as a reference.      Medications           Morning Afternoon Evening Bedtime As Needed    acetaminophen 325 MG tablet   Commonly known as:  TYLENOL   Take 2 tablets (650 mg) by mouth every 4 hours as needed for mild pain                                ALPRAZolam 0.5 MG tablet   Commonly known as:  XANAX   Take 1 tablet (0.5 mg) by mouth as needed for anxiety (for anxiety with blood draws)                                calcium 500/D 500-400 MG-UNIT Chew   Generic drug:  Calcium Carb-Cholecalciferol                                ciprofloxacin-dexamethasone otic suspension   Commonly known as:  CIPRODEX   Place 5 drops Into the left ear 2 times daily for 14 days                                docusate sodium 100 MG tablet   Commonly known as:  COLACE   Take 100 mg by mouth daily                                fexofenadine  180 MG tablet   Commonly known as:  ALLEGRA   daily                                Fiber (Guar Gum) Chew   Take 2.5 g by mouth 2 times daily                                HYDROcodone-acetaminophen 5-325 MG per tablet   Commonly known as:  NORCO   Take 1 tablet by mouth every 6 hours as needed for pain (moderate to severe pain)                                ibuprofen 600 MG tablet   Commonly known as:  ADVIL/MOTRIN   Take 1 tablet (600 mg) by mouth every 6 hours as needed for pain or fever (mild to moderate pain, or temperature greater than 102 F)                                Multi-vitamin Tabs tablet   1 DAILY   Generic drug:  multivitamin, therapeutic with minerals                                order for DME   Equipment being ordered: orthotics

## 2018-08-03 NOTE — ANESTHESIA POSTPROCEDURE EVALUATION
Patient: Pathamon Somjaipeng Wing    Procedure(s):  Bilateral Ear Exam and Mastoid Cleaning Under Anesthesia - Wound Class: II-Clean Contaminated    Diagnosis:Ear Cerumen  Diagnosis Additional Information: No value filed.    Anesthesia Type:  MAC    Note:  Anesthesia Post Evaluation    Patient location during evaluation: PACU and Bedside  Patient participation: Unable to participate in evaluation secondary to underlying medical condition  Level of consciousness: awake  Pain management: adequate  Airway patency: patent  Cardiovascular status: acceptable  Respiratory status: acceptable  Hydration status: acceptable  PONV: none     Anesthetic complications: None          Last vitals:  Vitals:    08/03/18 0800 08/03/18 1029 08/03/18 1045   BP: 131/76 100/62 103/66   Pulse: 93     Resp:  16 15   Temp: 36.8  C (98.2  F) 36.5  C (97.7  F)    SpO2: 99% 100% 100%         Electronically Signed By: Xiomara Syed MD  August 3, 2018  11:16 AM

## 2018-08-03 NOTE — OR NURSING
Ambulatory, cooperative with staff. Non verbal, does well with short instructions with cares. Will sign when she has to go to the bathroom. Blind. Took xanex this morning. Calm at present.

## 2018-08-03 NOTE — OP NOTE
Date of service:  8/03/18    Preoperative diagnosis:  Bilateral hearing loss    Postoperative diagnosis:  Bilateral hearing loss and left mastoid cavity infection    Procedure:  Bilateral mastoid cavity debridement under anesthesia    Surgeon:  Alexa Moyer MD    Resident:  Martina De La Cruz MD    Anesthesia:  General    Complications:  None    EBL:  None    Specimens:  None    Findings:  Right with dry debris, cavity otherwise dry, some red but dry granulation tissue on the floor.  Left with dry debris but otorrhea underneath, beefy red friable granulation tissue on the floor.    Indications:  Pavithra Wing is a 23 year old with bilateral mastoid cavities and developmental delay.  The above procedure was discussed with the legal guardian and consent was obtained.    Procedure:  Patient was taken to the operating room and placed supine on the operating table.  After mask anesthesia was performed, Time Out was then performed with confirmation of the patient, site and procedure.  The operating microscope was brought into position and the right ear was examined.  The mastoid cavity had dry debris laterally which was removed with a curette.  Further dry debris was removed with a suction.  The cavity is dry with no evidence of infection.  There was noted to be red dry granulation tissue on the floor of the mastoid.  The opposite ear was examined.  There is noted to be dry debris laterally which was removed with a curette.  Underneath this there is otorrhea in the mastoid cavity with beefy red friable granulation tissue.  This was cleaned as much as possible.  A cotton ball was placed over the ear.  The patient tolerated the procedure well with no complications.  Awakened and taken to the PACU in stable condition.

## 2018-08-03 NOTE — ANESTHESIA CARE TRANSFER NOTE
Patient: Kerriamon Somelmo Wing    Procedure(s):  Bilateral Ear Exam and Mastoid Cleaning Under Anesthesia - Wound Class: II-Clean Contaminated    Diagnosis: Ear Cerumen  Diagnosis Additional Information: No value filed.    Anesthesia Type:   MAC     Note:  Airway :Face Mask  Patient transferred to:PACU  Handoff Report: Identifed the Patient, Identified the Reponsible Provider, Reviewed the pertinent medical history, Discussed the surgical course, Reviewed Intra-OP anesthesia mangement and issues during anesthesia, Set expectations for post-procedure period and Allowed opportunity for questions and acknowledgement of understanding      Vitals: (Last set prior to Anesthesia Care Transfer)    CRNA VITALS  8/3/2018 0956 - 8/3/2018 1030      8/3/2018             Resp Rate (observed): 18                Electronically Signed By: SEB Blevins CRNA  August 3, 2018  10:30 AM

## 2018-08-03 NOTE — BRIEF OP NOTE
[facility], [place]    Brief Operative Note    Pre-operative diagnosis: Ear Cerumen  Post-operative diagnosis Ear Cerumen  Procedure: Procedure(s):  Bilateral Ear Exam and Mastoid Cleaning Under Anesthesia - Wound Class: II-Clean Contaminated  Surgeon: Surgeon(s) and Role:     * Alexa Moyer MD - Primary     * Martina De La Cruz MD - Primary  Anesthesia: General   Estimated blood loss: 2cc  Drains: None  Specimens: * No specimens in log *  Findings:   Right ear with impacted cerumen and small amount of granulation tissue posteriorly. Left ear with moderate amount of friabe granulation tissue along medial cavity. Scant white/yellow otorrhea..  Complications: None.  Implants: None.

## 2018-08-05 ENCOUNTER — DOCUMENTATION ONLY (OUTPATIENT)
Dept: OTHER | Facility: CLINIC | Age: 23
End: 2018-08-05

## 2018-08-20 ENCOUNTER — TELEPHONE (OUTPATIENT)
Dept: PEDIATRICS | Facility: CLINIC | Age: 23
End: 2018-08-20

## 2018-08-20 DIAGNOSIS — D47.3 ESSENTIAL THROMBOCYTHEMIA (H): ICD-10-CM

## 2018-08-20 LAB
BASOPHILS # BLD AUTO: 0.1 10E9/L (ref 0–0.2)
BASOPHILS NFR BLD AUTO: 0.9 %
DIFFERENTIAL METHOD BLD: ABNORMAL
EOSINOPHIL # BLD AUTO: 0.2 10E9/L (ref 0–0.7)
EOSINOPHIL NFR BLD AUTO: 1.5 %
ERYTHROCYTE [DISTWIDTH] IN BLOOD BY AUTOMATED COUNT: 14.2 % (ref 10–15)
HCT VFR BLD AUTO: 42.9 % (ref 35–47)
HGB BLD-MCNC: 13.6 G/DL (ref 11.7–15.7)
IMM GRANULOCYTES # BLD: 0.1 10E9/L (ref 0–0.4)
IMM GRANULOCYTES NFR BLD: 0.4 %
LYMPHOCYTES # BLD AUTO: 4.6 10E9/L (ref 0.8–5.3)
LYMPHOCYTES NFR BLD AUTO: 39.4 %
MCH RBC QN AUTO: 23.7 PG (ref 26.5–33)
MCHC RBC AUTO-ENTMCNC: 31.7 G/DL (ref 31.5–36.5)
MCV RBC AUTO: 75 FL (ref 78–100)
MONOCYTES # BLD AUTO: 0.9 10E9/L (ref 0–1.3)
MONOCYTES NFR BLD AUTO: 7.5 %
NEUTROPHILS # BLD AUTO: 5.9 10E9/L (ref 1.6–8.3)
NEUTROPHILS NFR BLD AUTO: 50.3 %
NRBC # BLD AUTO: 0 10*3/UL
NRBC BLD AUTO-RTO: 0 /100
PLATELET # BLD AUTO: 2598 10E9/L (ref 150–450)
RBC # BLD AUTO: 5.75 10E12/L (ref 3.8–5.2)
WBC # BLD AUTO: 11.6 10E9/L (ref 4–11)

## 2018-08-20 PROCEDURE — T1013 SIGN LANG/ORAL INTERPRETER: HCPCS | Mod: U3

## 2018-08-20 PROCEDURE — 85025 COMPLETE CBC W/AUTO DIFF WBC: CPT | Performed by: INTERNAL MEDICINE

## 2018-08-20 NOTE — TELEPHONE ENCOUNTER
Lorie states company for the [facility] that Pavithra resides at is needing to have physician orders and standing orders to be signed.  This needs to be annually and she is need of this now because she came in mid-year.  Lorie will be dropping off the forms at the .  Shea Medina R.N.

## 2018-08-20 NOTE — TELEPHONE ENCOUNTER
Group home is calling stated they need provider to look over patients medication orders and standing orders.   Please call to discuss  Thank you

## 2018-08-21 ENCOUNTER — TELEPHONE (OUTPATIENT)
Dept: PEDIATRICS | Facility: CLINIC | Age: 23
End: 2018-08-21

## 2018-08-21 NOTE — TELEPHONE ENCOUNTER
Reason for Call:  Form, our goal is to have forms completed with 72 hours, however, some forms may require a visit or additional information.    Type of letter, form or note:  Physician's Order    Who is the form from?: Patient    Where did the form come from: Patient or family brought in       What clinic location was the form placed at?: [place]    Where the form was placed: 's Box    What number is listed as a contact on the form?: [phone]       Additional comments: None    Call taken on 8/21/2018 at 3:15 PM by Christina Robertson

## 2018-09-11 DIAGNOSIS — K59.00 CONSTIPATION, UNSPECIFIED CONSTIPATION TYPE: ICD-10-CM

## 2018-09-12 RX ORDER — DOCUSATE SODIUM 100 MG/1
CAPSULE, LIQUID FILLED ORAL
Qty: 90 CAPSULE | Refills: 1 | Status: SHIPPED | OUTPATIENT
Start: 2018-09-12 | End: 2019-03-20

## 2018-09-18 ENCOUNTER — ONCOLOGY VISIT (OUTPATIENT)
Dept: ONCOLOGY | Facility: CLINIC | Age: 23
End: 2018-09-18
Attending: INTERNAL MEDICINE
Payer: COMMERCIAL

## 2018-09-18 VITALS
BODY MASS INDEX: 19.52 KG/M2 | DIASTOLIC BLOOD PRESSURE: 91 MMHG | RESPIRATION RATE: 14 BRPM | HEART RATE: 44 BPM | OXYGEN SATURATION: 100 % | HEIGHT: 59 IN | WEIGHT: 96.8 LBS | SYSTOLIC BLOOD PRESSURE: 132 MMHG | TEMPERATURE: 96.8 F

## 2018-09-18 DIAGNOSIS — D75.839 THROMBOCYTOSIS: ICD-10-CM

## 2018-09-18 DIAGNOSIS — D47.3 ESSENTIAL THROMBOCYTHEMIA (H): Primary | ICD-10-CM

## 2018-09-18 PROCEDURE — 99213 OFFICE O/P EST LOW 20 MIN: CPT | Mod: ZP | Performed by: INTERNAL MEDICINE

## 2018-09-18 PROCEDURE — G0463 HOSPITAL OUTPT CLINIC VISIT: HCPCS | Mod: ZF

## 2018-09-18 RX ORDER — ALPRAZOLAM 0.5 MG
1 TABLET ORAL PRN
Qty: 10 TABLET | Refills: 0 | Status: SHIPPED | OUTPATIENT
Start: 2018-09-18 | End: 2019-09-10 | Stop reason: ALTCHOICE

## 2018-09-18 ASSESSMENT — PAIN SCALES - GENERAL: PAINLEVEL: NO PAIN (0)

## 2018-09-18 NOTE — PROGRESS NOTES
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      Forward History:  2017     - Thrombocytosis noted after hysterectomy for menorrhagia.  No significant postop bleeding.    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 Range 2017 08:21   Factor 8 Assay Latest Ref Range: 55 - 200 % 118   von Willebrand Antigen Latest Ref Range: 50 - 200 % 126   Ristocetin Cofactor Activity Unknown 53   von Willebrand Factor Activity Latest Ref Range: 50 - 180 % 81       Visit History:  Dayton returns today for a routine follow up visit. She is accompanied by 2 people from her group home: Albert and Lorie who know her well.  Father (Anselmo) is out of town - he usually accompanies her to visits.  They both report that Dayton is doing well. No concerns for bleeding or clotting. Specifically, no epistaxis, bruising, hematuria, melena/hematochezia, abdominal pain, or arm/leg swelling.  They report the last 2 lab appointments have been very difficult as Dayton is very strong and despite 0,5 mg of alprazolam she is able to resist very strongly against those who are restraining to help with the blood draw.  Obviously, these blood draws are extremely stressful to her.    Medications:  Current Outpatient Prescriptions   Medication Sig Dispense Refill     ALPRAZolam (XANAX) 0.5 MG tablet Take 2 tablets (1 mg) by mouth as needed for anxiety (for anxiety with blood draws) 10 tablet 0     CALCIUM 500/D 500-400 MG-UNIT CHEW         MG capsule TAKE 1 CAPSULE BY MOUTH ONCE  "DAILY 90 capsule 1     fexofenadine (ALLEGRA) 180 MG tablet daily        MULTI-VITAMIN OR TABS 1 DAILY       order for DME Equipment being ordered: orthotics 1 Device 0     acetaminophen (TYLENOL) 325 MG tablet Take 2 tablets (650 mg) by mouth every 4 hours as needed for mild pain (Patient not taking: Reported on 9/18/2018) 100 tablet 0     Fiber, Guar Gum, CHEW Take 2.5 g by mouth 2 times daily       HYDROcodone-acetaminophen (NORCO) 5-325 MG per tablet Take 1 tablet by mouth every 6 hours as needed for pain (moderate to severe pain) (Patient not taking: Reported on 9/18/2018) 18 tablet 0     ibuprofen (ADVIL/MOTRIN) 600 MG tablet Take 1 tablet (600 mg) by mouth every 6 hours as needed for pain or fever (mild to moderate pain, or temperature greater than 102 F) (Patient not taking: Reported on 9/18/2018) 30 tablet 0        Allergies:  Allergies   Allergen Reactions     Benzalkonium Chloride Rash       ROS:  A 10 point ROS is negative except as stated in the HPI    Objective:  BP (!) 132/91 (BP Location: Left arm, Patient Position: Sitting, Cuff Size: Adult Regular)  Pulse (!) 44  Temp 96.8  F (36  C) (Tympanic)  Resp 14  Ht 1.499 m (4' 11.02\")  Wt 43.9 kg (96 lb 12.8 oz)  LMP 01/20/2017  SpO2 100%  BMI 19.54 kg/m2  Exam:   Gen: Appears well, no distress.  She does not make eye contact and is unable to communicate  HEENT: no scleral icterus or hemorrhage, no wet purpura, no lymphadenopathy  Ext: no swelling of arms or legs  Skin: no hematomas or ecchymoses    Labs:  Aug 2018 PLT count is 2598    Imaging:  none    Assessment:  Pavithra Wing is a 23 year old woman with a history of severe cognitive impairment, blindness with CALR+ ET (very low risk) who returns for routine follow up. No evidence of hyperviscosity or bleeding (acquired vWD)    Plan:  1. Follow-up in 6 months, with repeat blood counts the week prior   2. Increase alprazolam to 1.0 mg PO 1 hour prior to lab draws  3.. They will call " sooner if she develops concerning symptoms. We provided written instructions about the potential complications of ET.     Total Time Spent:  I spent a total of 15 minutes face-to-face with Pavithra Wing during today's office visit.  Over 50% of this time was spent counseling the patient and/or coordinating care regarding ET.    Taurus Myrick MD   of Medicine  [facility] [employer]

## 2018-09-18 NOTE — NURSING NOTE
"Oncology Rooming Note    September 18, 2018 10:04 AM   Pavithra Wing is a 23 year old female who presents for:    Chief Complaint   Patient presents with     Oncology Clinic Visit     Return visit related to Thrombocytosis     Initial Vitals: BP (!) 132/91 (BP Location: Left arm, Patient Position: Sitting, Cuff Size: Adult Regular)  Pulse (!) 44  Temp 96.8  F (36  C) (Tympanic)  Resp 14  Ht 1.499 m (4' 11.02\")  Wt 43.9 kg (96 lb 12.8 oz)  LMP 01/20/2017  SpO2 100%  BMI 19.54 kg/m2 Estimated body mass index is 19.54 kg/(m^2) as calculated from the following:    Height as of this encounter: 1.499 m (4' 11.02\").    Weight as of this encounter: 43.9 kg (96 lb 12.8 oz). Body surface area is 1.35 meters squared.  No Pain (0) Comment: Data Unavailable   Patient's last menstrual period was 01/20/2017.  Allergies reviewed: Yes  Medications reviewed: Yes    Medications: Medication refills not needed today.  Pharmacy name entered into [employer]:    [id number] - [place], [place] - [address]  AT [address] OF [facility] [address] [place] [facility] - [place], [place] - [address] [facility]  [facility] [employer] - [place], [place] - [address]    Clinical concerns: No new concerns. Provider was notified.    10 minutes for nursing intake (face to face time)     Yoli Guallpa LPN            "

## 2018-09-18 NOTE — MR AVS SNAPSHOT
After Visit Summary   9/18/2018    Pavithra Wing    MRN: [id number]           Patient Information     Date Of Birth          1995        Visit Information        Provider Department      9/18/2018 9:45 AM Claudia Hare; Taurus Myrick MD [facility]        Today's Diagnoses     Essential thrombocythemia (H)    -  1    Thrombocytosis (H)           Follow-ups after your visit        Follow-up notes from your care team     Return in about 6 months (around 3/18/2019) for Lab Work.      Your next 10 appointments already scheduled     Sep 20, 2018  1:45 PM CDT   Return Visit with Alexa Moyer MD   [facility] Ear Nose and Throat ([facility])    [address]  4th Floor  [facility] [id number] [phone]            Mar 19, 2019  9:30 AM CDT   [facility] Lab Draw with  [facility] LAB DRAW   [facility] Lab Draw ([facility])    [address] [place] [address]  [address]  [facility] [id number] [phone]            Mar 19, 2019 10:00 AM CDT   (Arrive by 9:45 AM)   Return Visit with Taurus Myrick MD   [facility] ([facility])    [address] [place] [address]  [address]  [facility] [id number] [phone]              Who to contact     If you have questions or need follow up information about today's clinic visit or your schedule please contact [facility] directly at [phone].  Normal or non-critical lab and imaging results will be communicated to you by MyChart, letter or phone within 4 business days after the clinic has received the results. If you do not hear from us within 7 days, please contact the clinic through [employer] or phone. If you have a critical or abnormal lab result, we will notify you by phone as soon as possible.  Submit refill requests through [employer] or call your pharmacy and they will forward the refill request to us.  "Please allow 3 business days for your refill to be completed.          Additional Information About Your Visit        Care EveryWhere ID     This is your Care EveryWhere ID. This could be used by other organizations to access your [facility] medical records  [id number]        Your Vitals Were     Pulse Temperature Respirations Height Last Period Pulse Oximetry    44 96.8  F (36  C) (Tympanic) 14 1.499 m (4' 11.02\") 01/20/2017 100%    BMI (Body Mass Index)                   19.54 kg/m2            Blood Pressure from Last 3 Encounters:   09/18/18 (!) 132/91   08/03/18 103/83   07/26/18 127/75    Weight from Last 3 Encounters:   09/18/18 43.9 kg (96 lb 12.8 oz)   08/03/18 45.2 kg (99 lb 10.4 oz)   07/26/18 46.3 kg (102 lb)              Today, you had the following     No orders found for display         Today's Medication Changes          These changes are accurate as of 9/18/18 11:02 AM.  If you have any questions, ask your nurse or doctor.               These medicines have changed or have updated prescriptions.        Dose/Directions    ALPRAZolam 0.5 MG tablet   Commonly known as:  XANAX   This may have changed:  how much to take   Used for:  Thrombocytosis (H)   Changed by:  Taurus Myrick MD        Dose:  1 mg   Take 2 tablets (1 mg) by mouth as needed for anxiety (for anxiety with blood draws)   Quantity:  10 tablet   Refills:  0            Where to get your medicines      Some of these will need a paper prescription and others can be bought over the counter.  Ask your nurse if you have questions.     Bring a paper prescription for each of these medications     ALPRAZolam 0.5 MG tablet                Primary Care Provider Office Phone # Fax #    SEB Liebemran [facility] [phone] [address] [facility] [address]        Equal Access to Services     RUDDY GALVIN AH: Topher mendozao Soomaali, waaxda luqadaha, qaybta kaalmada adeegshilo, mari trejo " lapadmini sarah So [facility] [phone].    ATENCIÓN: Si varshala berta, tiene a doe disposición servicios gratuitos de asistencia lingüística. Joseph lee [phone].    We comply with applicable federal civil rights laws and [place] laws. We do not discriminate on the basis of race, color, national origin, age, disability, sex, sexual orientation, or gender identity.            Thank you!     Thank you for choosing [facility]  for your care. Our goal is always to provide you with excellent care. Hearing back from our patients is one way we can continue to improve our services. Please take a few minutes to complete the written survey that you may receive in the mail after your visit with us. Thank you!             Your Updated Medication List - Protect others around you: Learn how to safely use, store and throw away your medicines at www.disposemymeds.org.          This list is accurate as of 9/18/18 11:02 AM.  Always use your most recent med list.                   Brand Name Dispense Instructions for use Diagnosis    acetaminophen 325 MG tablet    TYLENOL    100 tablet    Take 2 tablets (650 mg) by mouth every 4 hours as needed for mild pain    S/P hysterectomy       ALPRAZolam 0.5 MG tablet    XANAX    10 tablet    Take 2 tablets (1 mg) by mouth as needed for anxiety (for anxiety with blood draws)    Thrombocytosis (H)       calcium 500/D 500-400 MG-UNIT Chew   Generic drug:  Calcium Carb-Cholecalciferol            MG capsule   Generic drug:  docusate sodium     90 capsule    TAKE 1 CAPSULE BY MOUTH ONCE DAILY    Constipation, unspecified constipation type       fexofenadine 180 MG tablet    ALLEGRA     daily        Fiber (Guar Gum) Chew      Take 2.5 g by mouth 2 times daily        HYDROcodone-acetaminophen 5-325 MG per tablet    NORCO    18 tablet    Take 1 tablet by mouth every 6 hours as needed for pain (moderate to severe pain)    Dental impaction       ibuprofen 600 MG tablet     ADVIL/MOTRIN    30 tablet    Take 1 tablet (600 mg) by mouth every 6 hours as needed for pain or fever (mild to moderate pain, or temperature greater than 102 F)    Dental impaction       Multi-vitamin Tabs tablet   Generic drug:  multivitamin, therapeutic with minerals      1 DAILY        order for DME     1 Device    Equipment being ordered: orthotics    Development delay

## 2018-09-18 NOTE — LETTER
2018       RE: Dayton Wing  [address] [address]     Dear Colleague,    Thank you for referring your patient, Dayton Wing, to the [facility]. Please see a copy of my visit note below.        [facility] Hematology Follow Up Visit    Outpatient Visit Note:    Patient: Dayton iWng  MRN: [id number]  : 1995  DERRELL: Sep 18, 2018    Dayton Wing is a 23 year old woman with a history of severe cognitive impairment, blindness with CALR+, very low risk, ET on observation alone, who returns for routine follow up.      Forward History:  2017     - Thrombocytosis noted after hysterectomy for menorrhagia.  No significant postop bleeding.    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 Range 2017 08:21   Factor 8 Assay Latest Ref Range: 55 - 200 % 118   von Willebrand Antigen Latest Ref Range: 50 - 200 % 126   Ristocetin Cofactor Activity Unknown 53   von Willebrand Factor Activity Latest Ref Range: 50 - 180 % 81       Visit History:  Dayton returns today for a routine follow up visit. She is accompanied by 2 people from her group home: Albert and Lorie who know her well.  Father (Anselmo) is out of town - he usually accompanies her to visits.  They both report that Dayton is doing well. No concerns for bleeding or clotting. Specifically, no epistaxis, bruising, hematuria, melena/hematochezia, abdominal pain, or arm/leg swelling.  They report the last 2 lab appointments have been very difficult as Dayton is very strong and despite 0,5 mg of alprazolam she is able to resist very strongly against those who are restraining to help with the blood draw.  Obviously, these blood draws are extremely stressful to her.    Medications:  Current Outpatient Prescriptions  "  Medication Sig Dispense Refill     ALPRAZolam (XANAX) 0.5 MG tablet Take 2 tablets (1 mg) by mouth as needed for anxiety (for anxiety with blood draws) 10 tablet 0     CALCIUM 500/D 500-400 MG-UNIT CHEW         MG capsule TAKE 1 CAPSULE BY MOUTH ONCE DAILY 90 capsule 1     fexofenadine (ALLEGRA) 180 MG tablet daily        MULTI-VITAMIN OR TABS 1 DAILY       order for DME Equipment being ordered: orthotics 1 Device 0     acetaminophen (TYLENOL) 325 MG tablet Take 2 tablets (650 mg) by mouth every 4 hours as needed for mild pain (Patient not taking: Reported on 9/18/2018) 100 tablet 0     Fiber, Guar Gum, CHEW Take 2.5 g by mouth 2 times daily       HYDROcodone-acetaminophen (NORCO) 5-325 MG per tablet Take 1 tablet by mouth every 6 hours as needed for pain (moderate to severe pain) (Patient not taking: Reported on 9/18/2018) 18 tablet 0     ibuprofen (ADVIL/MOTRIN) 600 MG tablet Take 1 tablet (600 mg) by mouth every 6 hours as needed for pain or fever (mild to moderate pain, or temperature greater than 102 F) (Patient not taking: Reported on 9/18/2018) 30 tablet 0        Allergies:  Allergies   Allergen Reactions     Benzalkonium Chloride Rash       ROS:  A 10 point ROS is negative except as stated in the HPI    Objective:  BP (!) 132/91 (BP Location: Left arm, Patient Position: Sitting, Cuff Size: Adult Regular)  Pulse (!) 44  Temp 96.8  F (36  C) (Tympanic)  Resp 14  Ht 1.499 m (4' 11.02\")  Wt 43.9 kg (96 lb 12.8 oz)  LMP 01/20/2017  SpO2 100%  BMI 19.54 kg/m2  Exam:   Gen: Appears well, no distress.  She does not make eye contact and is unable to communicate  HEENT: no scleral icterus or hemorrhage, no wet purpura, no lymphadenopathy  Ext: no swelling of arms or legs  Skin: no hematomas or ecchymoses    Labs:  Aug 2018 PLT count is 2598    Imaging:  none    Assessment:  Pavithra Wing is a 23 year old woman with a history of severe cognitive impairment, blindness with CALR+ ET (very low " risk) who returns for routine follow up. No evidence of hyperviscosity or bleeding (acquired vWD)    Plan:  1. Follow-up in 6 months, with repeat blood counts the week prior   2. Increase alprazolam to 1.0 mg PO 1 hour prior to lab draws  3.. They will call sooner if she develops concerning symptoms. We provided written instructions about the potential complications of ET.     Total Time Spent:  I spent a total of 15 minutes face-to-face with Pavithra Wing during today's office visit.  Over 50% of this time was spent counseling the patient and/or coordinating care regarding ET.    Taurus Myrick MD   of Medicine  [employer]

## 2018-09-20 ENCOUNTER — OFFICE VISIT (OUTPATIENT)
Dept: OTOLARYNGOLOGY | Facility: CLINIC | Age: 23
End: 2018-09-20
Payer: COMMERCIAL

## 2018-09-20 DIAGNOSIS — H74.92 DISORDER OF LEFT MASTOID: Primary | ICD-10-CM

## 2018-09-20 NOTE — LETTER
9/20/2018      RE: Pavithra Wing  [address] [address]       Pavithra Wing is seen for bilateral mastoid cavity checks.  However, we have been unable to examine her ears in clinic previously as she resists having anything put into her ears.  She had an ear exam under anesthesia last month and she was noted to have a left mastoid cavity infection with beefy granulation tissue which was cultured and cleaned.  Surprisingly the culture was negative.  Her home has been using drops and she is here for an ear check.  Her home worker says there's been no obvious drainage from either ear and no evidence that she's having ear pain.    Physical examination:  female in no acute distress.  Alert and sitting quietly in the exam chair.  She does follow some commands.  HB 1/6 bilaterally.  I attempted to look into her ears.  She was willing to help me hold the handheld otoscope and even brought the otoscope close to her ear but would not actually put the ear speculum into her ear despite multiple attempts and encouragement.  I tried to guide the otoscope but she definitely fought the speculum going into her ear.    Assessment and plan:  Unable to tolerate examination.  This has been true in the past as well although she was quite cooperative with everything else.  Since she's not having symptoms, there is no indication to place her under sedation again for another ear exam.  If her home notices drainage or she seems to have ear discomfort, then we'll set her up for an exam under anesthesia.      Alexa Moyer MD

## 2018-09-20 NOTE — NURSING NOTE
Chief Complaint   Patient presents with     RECHECK     Follow Up     Patient is non-communicative and is here for a follow up. She has a group home director with her.      Rachel Mary LPN

## 2018-09-20 NOTE — MR AVS SNAPSHOT
After Visit Summary   9/20/2018    Pavithra Wing    MRN: [id number]           Patient Information     Date Of Birth          1995        Visit Information        Provider Department      9/20/2018 1:45 PM Alexa Moyer MD [facility] Ear Nose and Throat        Today's Diagnoses     Disorder of left mastoid    -  1       Follow-ups after your visit        Follow-up notes from your care team     Return if symptoms worsen or fail to improve.      Your next 10 appointments already scheduled     Oct 03, 2018 10:30 AM CDT   Nurse Only with AN FLU CLINIC   [facility] ([facility])    [address] [address]   [phone]            Mar 19, 2019  9:30 AM CDT   [facility] Lab Draw with  [facility] DRAW   [facility] Lab Draw ([facility])    [address] [place] [address]  [address] [place] [address] [phone]            Mar 19, 2019 10:00 AM CDT   (Arrive by 9:45 AM)   Return Visit with Taurus Myrick MD   [facility] ([facility])    [address] [place] [address]  [address] [place] [address] [phone]              Who to contact     Please call your clinic at [phone] to:    Ask questions about your health    Make or cancel appointments    Discuss your medicines    Learn about your test results    Speak to your doctor            Additional Information About Your Visit        Care EveryWhere ID     This is your Care EveryWhere ID. This could be used by other organizations to access your [place] medical records  [id number]        Your Vitals Were     Last Period                   01/20/2017            Blood Pressure from Last 3 Encounters:   09/18/18 (!) 132/91   08/03/18 103/83   07/26/18 127/75    Weight from Last 3 Encounters:   09/18/18 43.9 kg (96 lb 12.8 oz)   08/03/18 45.2 kg (99 lb 10.4 oz)   07/26/18 46.3 kg (102 lb)              Today, you had the  following     No orders found for display       Primary Care Provider Office Phone # Fax #    SEB Lieberman [facility] [phone] [address] [facility] [address]        Equal Access to Services     RUDDY GALVIN : Hadphil sanchez hadkuldeepo Soomaali, waaxda luqadaha, qaybta kaalmada adeegyada, mari avila hayvenusn terrypamela trejo michelle . So [facility] [phone].    ATENCIÓN: Si habla español, tiene a doe disposición servicios gratuitos de asistencia lingüística. Llame al [phone].    We comply with applicable federal civil rights laws and [place] laws. We do not discriminate on the basis of race, color, national origin, age, disability, sex, sexual orientation, or gender identity.            Thank you!     Thank you for choosing [facility] EAR NOSE AND THROAT  for your care. Our goal is always to provide you with excellent care. Hearing back from our patients is one way we can continue to improve our services. Please take a few minutes to complete the written survey that you may receive in the mail after your visit with us. Thank you!             Your Updated Medication List - Protect others around you: Learn how to safely use, store and throw away your medicines at www.disposemymeds.org.          This list is accurate as of 9/20/18 11:59 PM.  Always use your most recent med list.                   Brand Name Dispense Instructions for use Diagnosis    acetaminophen 325 MG tablet    TYLENOL    100 tablet    Take 2 tablets (650 mg) by mouth every 4 hours as needed for mild pain    S/P hysterectomy       ALPRAZolam 0.5 MG tablet    XANAX    10 tablet    Take 2 tablets (1 mg) by mouth as needed for anxiety (for anxiety with blood draws)    Thrombocytosis (H)       calcium 500/D 500-400 MG-UNIT Chew   Generic drug:  Calcium Carb-Cholecalciferol            MG capsule   Generic drug:  docusate sodium     90 capsule    TAKE 1 CAPSULE BY MOUTH ONCE DAILY    Constipation, unspecified  constipation type       fexofenadine 180 MG tablet    ALLEGRA     daily        Fiber (Guar Gum) Chew      Take 2.5 g by mouth 2 times daily        HYDROcodone-acetaminophen 5-325 MG per tablet    NORCO    18 tablet    Take 1 tablet by mouth every 6 hours as needed for pain (moderate to severe pain)    Dental impaction       ibuprofen 600 MG tablet    ADVIL/MOTRIN    30 tablet    Take 1 tablet (600 mg) by mouth every 6 hours as needed for pain or fever (mild to moderate pain, or temperature greater than 102 F)    Dental impaction       Multi-vitamin Tabs tablet   Generic drug:  multivitamin, therapeutic with minerals      1 DAILY        order for DME     1 Device    Equipment being ordered: orthotics    Development delay

## 2018-09-20 NOTE — Clinical Note
9/20/2018       RE: Pavithra Wing  [address] [address]     Dear Colleague,    Thank you for referring your patient, Pavithra Wing, to the [facility] EAR NOSE AND THROAT at [facility]. Please see a copy of my visit note below.    Pavithra Wing is seen for bilateral mastoid cavity checks.  However, we have been unable to examine her ears in clinic previously as she resists having anything put into her ears.  She had an ear exam under anesthesia last month and she was noted to have a left mastoid cavity infection with beefy granulation tissue which was cultured and cleaned.  Surprisingly     Review of systems:  ***    Physical examination:  {MALE/FEMALE:283417} in no acute distress.  Alert and answering questions appropriately.  HB 1/6 bilaterally.  {RIGHT, LEFT-INITIAL CAP:5607} ears examined under the microscope.    Assessment and plan:  ***      Again, thank you for allowing me to participate in the care of your patient.      Sincerely,    Alexa Moyer MD

## 2018-09-21 ENCOUNTER — TELEPHONE (OUTPATIENT)
Dept: PEDIATRICS | Facility: CLINIC | Age: 23
End: 2018-09-21

## 2018-09-21 DIAGNOSIS — R62.50 DEVELOPMENT DELAY: ICD-10-CM

## 2018-09-21 DIAGNOSIS — H54.7 BLINDNESS: Primary | ICD-10-CM

## 2018-09-21 NOTE — TELEPHONE ENCOUNTER
I found the Prescription(s) and had  sign for Ngozi.        TC - Please fax as requested.  Thank you.  Shea Medina R.N.

## 2018-09-21 NOTE — TELEPHONE ENCOUNTER
Ngozi:  I tried to sign this Prescription(s) and print and it would not print.  Could you please do this.  Thank you.  Shea Medina R.N.

## 2018-09-21 NOTE — TELEPHONE ENCOUNTER
Rachel is calling to ask provider if she would send an order for 2 sets of curves utensils, and 2 sets of plates with suction cups     Please fax this to [phone]    Thank you

## 2018-10-02 NOTE — PROGRESS NOTES
Pavithra Wing is seen for bilateral mastoid cavity checks.  However, we have been unable to examine her ears in clinic previously as she resists having anything put into her ears.  She had an ear exam under anesthesia last month and she was noted to have a left mastoid cavity infection with beefy granulation tissue which was cultured and cleaned.  Surprisingly the culture was negative.  Her home has been using drops and she is here for an ear check.  Her home worker says there's been no obvious drainage from either ear and no evidence that she's having ear pain.    Physical examination:  female in no acute distress.  Alert and sitting quietly in the exam chair.  She does follow some commands.  HB 1/6 bilaterally.  I attempted to look into her ears.  She was willing to help me hold the handheld otoscope and even brought the otoscope close to her ear but would not actually put the ear speculum into her ear despite multiple attempts and encouragement.  I tried to guide the otoscope but she definitely fought the speculum going into her ear.    Assessment and plan:  Unable to tolerate examination.  This has been true in the past as well although she was quite cooperative with everything else.  Since she's not having symptoms, there is no indication to place her under sedation again for another ear exam.  If her home notices drainage or she seems to have ear discomfort, then we'll set her up for an exam under anesthesia.

## 2018-10-03 ENCOUNTER — ALLIED HEALTH/NURSE VISIT (OUTPATIENT)
Dept: NURSING | Facility: CLINIC | Age: 23
End: 2018-10-03
Payer: COMMERCIAL

## 2018-10-03 ENCOUNTER — TELEPHONE (OUTPATIENT)
Dept: PEDIATRICS | Facility: CLINIC | Age: 23
End: 2018-10-03

## 2018-10-03 DIAGNOSIS — Z23 NEED FOR PROPHYLACTIC VACCINATION AND INOCULATION AGAINST INFLUENZA: Primary | ICD-10-CM

## 2018-10-03 PROCEDURE — 99207 ZZC NO CHARGE NURSE ONLY: CPT

## 2018-10-03 PROCEDURE — 90471 IMMUNIZATION ADMIN: CPT

## 2018-10-03 PROCEDURE — 90686 IIV4 VACC NO PRSV 0.5 ML IM: CPT

## 2018-10-03 NOTE — PROGRESS NOTES
Injectable Influenza Immunization Documentation    1.  Is the person to be vaccinated sick today?   No    2. Does the person to be vaccinated have an allergy to a component   of the vaccine?   No  Egg Allergy Algorithm Link    3. Has the person to be vaccinated ever had a serious reaction   to influenza vaccine in the past?   No    4. Has the person to be vaccinated ever had Guillain-Barré syndrome?   No    Form completed by Jane Osullivan MA         Scratched patient with needle on the back of her right arm after I gave injection due to patient moving and trying to get away. Cleaned and bandaged area.  with her made aware.    Jane Osullivan MA

## 2018-10-03 NOTE — MR AVS SNAPSHOT
After Visit Summary   10/3/2018    Pavithra Wing    MRN: [id number]           Patient Information     Date Of Birth          1995        Visit Information        Provider Department      10/3/2018 10:30 AM Jennifer Gomez; [facility]  Services Department        Today's Diagnoses     Need for prophylactic vaccination and inoculation against influenza    -  1       Follow-ups after your visit        Your next 10 appointments already scheduled     Mar 19, 2019  9:30 AM CDT   [facility] Draw with  [facility] DRAW   [facility] Lab Draw ([facility])    [address] [place] [address]  [address]  [facility] [address] [phone]            Mar 19, 2019 10:00 AM CDT   (Arrive by 9:45 AM)   Return Visit with Taurus Myrick MD   [facility] ([facility])    [address] [place] [address]  [address] [place] [address] [phone]              Who to contact     If you have questions or need follow up information about today's clinic visit or your schedule please contact [facility] directly at [phone].  Normal or non-critical lab and imaging results will be communicated to you by MyChart, letter or phone within 4 business days after the clinic has received the results. If you do not hear from us within 7 days, please contact the clinic through MyChart or phone. If you have a critical or abnormal lab result, we will notify you by phone as soon as possible.  Submit refill requests through [employer] or call your pharmacy and they will forward the refill request to us. Please allow 3 business days for your refill to be completed.          Additional Information About Your Visit        Care EveryWhere ID     This is your Care EveryWhere ID. This could be used by other organizations to access your [facility] medical records  [id number]        Your Vitals Were     Last Period                    01/20/2017            Blood Pressure from Last 3 Encounters:   09/18/18 (!) 132/91   08/03/18 103/83   07/26/18 127/75    Weight from Last 3 Encounters:   09/18/18 96 lb 12.8 oz (43.9 kg)   08/03/18 99 lb 10.4 oz (45.2 kg)   07/26/18 102 lb (46.3 kg)              We Performed the Following     FLU VACCINE, SPLIT VIRUS, IM (QUADRIVALENT) [70595]- >3 YRS     Vaccine Administration, Initial [22882]        Primary Care Provider Office Phone # Fax #    Ngozi Randall, [facility] [phone] [address] [facility] [address]        Equal Access to Services     RUDDY GALVIN : Topher mendozao Sonabor, waaxda luqadaha, qaybta kaalmada adeegyada, mari martinez . So [facility] [phone].    ATENCIÓN: Si habla español, tiene a doe disposición servicios gratuitos de asistencia lingüística. Llame al [phone].    We comply with applicable federal civil rights laws and [place] laws. We do not discriminate on the basis of race, color, national origin, age, disability, sex, sexual orientation, or gender identity.            Thank you!     Thank you for choosing [facility]  for your care. Our goal is always to provide you with excellent care. Hearing back from our patients is one way we can continue to improve our services. Please take a few minutes to complete the written survey that you may receive in the mail after your visit with us. Thank you!             Your Updated Medication List - Protect others around you: Learn how to safely use, store and throw away your medicines at www.disposemymeds.org.          This list is accurate as of 10/3/18 11:05 AM.  Always use your most recent med list.                   Brand Name Dispense Instructions for use Diagnosis    acetaminophen 325 MG tablet    TYLENOL    100 tablet    Take 2 tablets (650 mg) by mouth every 4 hours as needed for mild pain    S/P hysterectomy       ALPRAZolam 0.5 MG tablet    XANAX     10 tablet    Take 2 tablets (1 mg) by mouth as needed for anxiety (for anxiety with blood draws)    Thrombocytosis (H)       calcium 500/D 500-400 MG-UNIT Chew   Generic drug:  Calcium Carb-Cholecalciferol            MG capsule   Generic drug:  docusate sodium     90 capsule    TAKE 1 CAPSULE BY MOUTH ONCE DAILY    Constipation, unspecified constipation type       fexofenadine 180 MG tablet    ALLEGRA     daily        Fiber (Guar Gum) Chew      Take 2.5 g by mouth 2 times daily        HYDROcodone-acetaminophen 5-325 MG per tablet    NORCO    18 tablet    Take 1 tablet by mouth every 6 hours as needed for pain (moderate to severe pain)    Dental impaction       ibuprofen 600 MG tablet    ADVIL/MOTRIN    30 tablet    Take 1 tablet (600 mg) by mouth every 6 hours as needed for pain or fever (mild to moderate pain, or temperature greater than 102 F)    Dental impaction       Multi-vitamin Tabs tablet   Generic drug:  multivitamin, therapeutic with minerals      1 DAILY        order for DME     1 Device    Equipment being ordered: orthotics    Development delay       order for DME     2 Units    Equipment being ordered: 2 sets of curved utensils and 2 sets of plates with suction cups.    Blindness, Development delay

## 2018-10-03 NOTE — LETTER
10/3/2018        RE: Pavithra Olmsteadmarkbrianneadrien Wing  [address] [address]        Please give Pavithra Fordangela Wing her Alprazolam 1mg 1 hour prior to medical appointments, medical procedures, injections, dental appointments and lab draws.            Ngozi Randall, PNP, APRN CNP  October 3, 2018

## 2018-10-03 NOTE — TELEPHONE ENCOUNTER
[facility] is requesting a letter/order to give the patient her Alprazolam 1mg 1 hour prior to medical appointments, medical procedures, injections, dental appointments and lab draws.    Please fax to [phone].      Jane Osullivan MA

## 2018-10-04 ENCOUNTER — TELEPHONE (OUTPATIENT)
Dept: FAMILY MEDICINE | Facility: CLINIC | Age: 23
End: 2018-10-04

## 2018-10-04 NOTE — TELEPHONE ENCOUNTER
Rachel states she would like an order for 2 sets of curved utensils, and 2  plates with suction cups.  Please fax to [phone].  Thank You.

## 2018-10-04 NOTE — TELEPHONE ENCOUNTER
This was done on 9/21/18.  I will follow up on this.  I spoke with Rachel and she does have the order that was sent on 9/21/18.  No further action needed.  Shea Medina R.N.

## 2019-02-19 NOTE — PROGRESS NOTES
SUBJECTIVE:   CC: Pavithra Wing is an 23 year old woman who presents for preventive health visit.     Healthy Habits:    Do you get at least three servings of calcium containing foods daily (dairy, green leafy vegetables, etc.)? yes    Amount of exercise or daily activities, outside of work: 0 day(s) per week    Problems taking medications regularly No    Medication side effects: No    Have you had an eye exam in the past two years? no    Do you see a dentist twice per year? yes    Do you have sleep apnea, excessive snoring or daytime drowsiness?no    Is at [facility] in [place].   Was adopted from [facility]. Is blind and does not speak.   Supervisor is with today. They have paperwork.   Does not get periods. Had hysterectomy.   Due for tdap.   Take benzo prior to office visits or dental visits.     They decline breast exam today and labs.     Has thrombocytothemia, follows with heme every 6 months.     Patient was adopted and previously in orphanage. FH is unknown.     Today's PHQ-2 Score:   PHQ-2 ( 1999 Pf[employer]) 3/29/2017   Q1: Little interest or pleasure in doing things 0   Q2: Feeling down, depressed or hopeless 0   PHQ-2 Score 0       Abuse: Current or Past(Physical, Sexual or Emotional)- No  Do you feel safe in your environment? Yes    Social History     Tobacco Use     Smoking status: Never Smoker     Smokeless tobacco: Never Used   Substance Use Topics     Alcohol use: No     If you drink alcohol do you typically have >3 drinks per day or >7 drinks per week? No                     Reviewed orders with patient.  Reviewed health maintenance and updated orders accordingly - Yes  Labs reviewed in EPIC  BP Readings from Last 3 Encounters:   03/01/19 135/71   09/18/18 (!) 132/91   08/03/18 103/83    Wt Readings from Last 3 Encounters:   03/01/19 46.3 kg (102 lb)   09/18/18 43.9 kg (96 lb 12.8 oz)   08/03/18 45.2 kg (99 lb 10.4 oz)                  Patient Active Problem List   Diagnosis     Development  delay: functional level of 2-2yo     Blindness     Impaired hearing     Mastoiditis     Constipation     Lactose intolerance     Irregular menstrual bleeding     [facility] - [facility]     Post mastoidectomy sequelae     Hyperlipidemia with target LDL less than 160     S/P hysterectomy     Essential thrombocythemia (H)     Past Surgical History:   Procedure Laterality Date     ADENOIDECTOMY  4/24/2014    Procedure: ADENOIDECTOMY;  Surgeon: Omar Quiroz MD;  [facility]     C ANESTH,EYE EXAM      Retinopathy of prematurity     DENTAL SURGERY       EXAM UNDER ANESTHESIA EAR(S)  4/27/2012    Procedure:EXAM UNDER ANESTHESIA EAR(S); Surgeon:OMAR QUIROZ; [facility]     EXAM UNDER ANESTHESIA EAR(S)  4/24/2014    Procedure: EXAM UNDER ANESTHESIA EAR(S);  Surgeon: Omar Quiroz MD;  [facility]     EXAM UNDER ANESTHESIA EAR(S) Bilateral 9/2/2015    Procedure: EXAM UNDER ANESTHESIA EAR(S);  Surgeon: Speedy Griffin MD;  [facility]     EXAM UNDER ANESTHESIA EAR(S) Bilateral 8/14/2017    Procedure: EXAM UNDER ANESTHESIA EAR(S);  Bilateral Ear Exam and Cerumenectomy under anesthesia;  Surgeon: Alexa Moyer MD;  [facility]     EXAM UNDER ANESTHESIA EYE(S)  4/24/2014    Procedure: EXAM UNDER ANESTHESIA EYE(S);  Surgeon: Romi Mary MD;  [facility]     EXAM UNDER ANESTHESIA NOSE  4/24/2014    Procedure: EXAM UNDER ANESTHESIA NOSE;  Surgeon: Omar Quiroz MD;  [facility]     EXAM UNDER ANESTHESIA, CLEAN MASTOID CAVITY Bilateral 8/3/2018    Procedure: EXAM UNDER ANESTHESIA, CLEAN MASTOID CAVITY;  Bilateral Ear Exam and Mastoid Cleaning Under Anesthesia;  Surgeon: Alexa Moyer MD;  [facility]     EXAM UNDER ANESTHESIA, RESTORATIONS, EXTRACTION(S) DENTAL COMPLEX, COMBINED  3/29/2012    Procedure:COMBINED EXAM UNDER ANESTHESIA, RESTORATIONS, EXTRACTION(S) DENTAL COMPLEX; Dental Exam, Radiograph, Dental Restorations, Periodontal Therapy, right ear mold impression.;  Surgeon:PRITI VILLEGAS; [facility]     EXAM UNDER ANESTHESIA, RESTORATIONS, EXTRACTION(S) DENTAL COMPLEX, COMBINED N/A 1/10/2018    Procedure: COMBINED EXAM UNDER ANESTHESIA, RESTORATIONS, EXTRACTION(S) DENTAL COMPLEX;  Dental Exam,, Radiographs, Periodontal Therapy, Floride Varnish, Extraction of Teeth # 16, 17, 32;  Surgeon: Priti Villegas DDS;  [facility]     EXAM UNDER ANESTHESIA, RESTORATIONS, EXTRACTION(S) DENTAL, COMBINED  4/24/2014    Procedure: COMBINED EXAM UNDER ANESTHESIA, RESTORATIONS, EXTRACTION(S) DENTAL;  Surgeon: Yun Flores DDS;  [facility]     EXAM UNDER ANESTHESIA, RESTORATIONS, EXTRACTION(S) DENTAL, COMBINED N/A 9/2/2015    Procedure: COMBINED EXAM UNDER ANESTHESIA, RESTORATIONS, EXTRACTION(S) DENTAL;  Surgeon: Priti Villegas DDS;  [facility]     IMPLANT SOPHONO DEVICE  4/27/2012    Procedure:IMPLANT SOPHONO DEVICE; Left Sophono Implant   ; Surgeon:RANDY WARNER; [facility]     LAPAROSCOPIC HYSTERECTOMY TOTAL, SALPINGECTOMY BILATERAL Bilateral 1/20/2017    Procedure: LAPAROSCOPIC HYSTERECTOMY TOTAL, SALPINGECTOMY BILATERAL;  Surgeon: Rosy Ryan MD;  [facility]     ODONTECTOMY N/A 1/10/2018    Procedure: ODONTECTOMY;   Extraction of Teeth # 16, 17, 3;  Surgeon: Cezar Fischer DDS;  [facility]     PE TUBES       PICC INSERTION      removed 2/25/09       Social History     Tobacco Use     Smoking status: Never Smoker     Smokeless tobacco: Never Used   Substance Use Topics     Alcohol use: No     Family History   Problem Relation Age of Onset     Unknown/Adopted Mother      Unknown/Adopted Father      Unknown/Adopted Other         adopted         Current Outpatient Medications   Medication Sig Dispense Refill     acetaminophen (TYLENOL) 325 MG tablet Take 2 tablets (650 mg) by mouth every 4 hours as needed for mild pain 100 tablet 0     ALPRAZolam (XANAX) 0.5 MG tablet Take 2 tablets (1 mg) by mouth as needed for anxiety (for  anxiety with blood draws) 10 tablet 0     CALCIUM 500/D 500-400 MG-UNIT CHEW         MG capsule TAKE 1 CAPSULE BY MOUTH ONCE DAILY 90 capsule 1     fexofenadine (ALLEGRA) 180 MG tablet daily        Fiber, Guar Gum, CHEW Take 2.5 g by mouth 2 times daily       MULTI-VITAMIN OR TABS 1 DAILY       order for DME Equipment being ordered: 2 sets of curved utensils and 2 sets of plates with suction cups. 2 Units 0     order for DME Equipment being ordered: orthotics 1 Device 0     ibuprofen (ADVIL/MOTRIN) 600 MG tablet Take 1 tablet (600 mg) by mouth every 6 hours as needed for pain or fever (mild to moderate pain, or temperature greater than 102 F) (Patient not taking: Reported on 3/1/2019) 30 tablet 0       Mammogram not appropriate for this patient based on age.    Pertinent mammograms are reviewed under the imaging tab.  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)      Hearing impaired      Heavy menstrual period      Hyperlipidaemia LDL goal < 160 8/12/2014     Irregular menstrual bleeding      Lactose intolerance 3/14/2011     Mastoiditis     (r)     PONV (postoperative nausea and vomiting)       Past Surgical History:   Procedure Laterality Date     ADENOIDECTOMY  4/24/2014    Procedure: ADENOIDECTOMY;  Surgeon: Omar Quiroz MD;  [facility]     C ANESTH,EYE EXAM      Retinopathy of prematurity     DENTAL SURGERY       EXAM UNDER ANESTHESIA EAR(S)  4/27/2012    Procedure:EXAM UNDER ANESTHESIA EAR(S); Surgeon:OMAR QUIROZ; [facility]     EXAM UNDER ANESTHESIA EAR(S)  4/24/2014    Procedure: EXAM UNDER ANESTHESIA EAR(S);  Surgeon: Omar Quiroz MD;  [facility]      EXAM UNDER ANESTHESIA EAR(S) Bilateral 9/2/2015    Procedure: EXAM UNDER ANESTHESIA EAR(S);  Surgeon: Speedy Griffin MD;  [facility]     EXAM UNDER ANESTHESIA EAR(S) Bilateral 8/14/2017    Procedure: EXAM UNDER ANESTHESIA EAR(S);  Bilateral Ear Exam and Cerumenectomy under anesthesia;  Surgeon: Alexa Moyer MD;  [facility]     EXAM UNDER ANESTHESIA EYE(S)  4/24/2014    Procedure: EXAM UNDER ANESTHESIA EYE(S);  Surgeon: Romi Mary MD;  [facility]     EXAM UNDER ANESTHESIA NOSE  4/24/2014    Procedure: EXAM UNDER ANESTHESIA NOSE;  Surgeon: Omar Quiroz MD;  [facility]     EXAM UNDER ANESTHESIA, CLEAN MASTOID CAVITY Bilateral 8/3/2018    Procedure: EXAM UNDER ANESTHESIA, CLEAN MASTOID CAVITY;  Bilateral Ear Exam and Mastoid Cleaning Under Anesthesia;  Surgeon: Alexa Moyer MD;  [facility]     EXAM UNDER ANESTHESIA, RESTORATIONS, EXTRACTION(S) DENTAL COMPLEX, COMBINED  3/29/2012    Procedure:COMBINED EXAM UNDER ANESTHESIA, RESTORATIONS, EXTRACTION(S) DENTAL COMPLEX; Dental Exam, Radiograph, Dental Restorations, Periodontal Therapy, right ear mold impression.; Surgeon:PRITI VILLEGAS; [facility]     EXAM UNDER ANESTHESIA, RESTORATIONS, EXTRACTION(S) DENTAL COMPLEX, COMBINED N/A 1/10/2018    Procedure: COMBINED EXAM UNDER ANESTHESIA, RESTORATIONS, EXTRACTION(S) DENTAL COMPLEX;  Dental Exam,, Radiographs, Periodontal Therapy, Floride Varnish, Extraction of Teeth # 16, 17, 32;  Surgeon: Priti Villegas DDS;  [facility]     EXAM UNDER ANESTHESIA, RESTORATIONS, EXTRACTION(S) DENTAL, COMBINED  4/24/2014    Procedure: COMBINED EXAM UNDER ANESTHESIA, RESTORATIONS, EXTRACTION(S) DENTAL;  Surgeon: Yun Flores DDS;  [facility]     EXAM UNDER ANESTHESIA, RESTORATIONS, EXTRACTION(S) DENTAL, COMBINED N/A 9/2/2015    Procedure: COMBINED EXAM UNDER ANESTHESIA, RESTORATIONS, EXTRACTION(S) DENTAL;  Surgeon: Priti Villegas DDS;  [facility]      "IMPLANT SOPHONO DEVICE  4/27/2012    Procedure:IMPLANT SOPHONO DEVICE; Left Sophono Implant   ; Surgeon:RANDY WARNER; [facility]     LAPAROSCOPIC HYSTERECTOMY TOTAL, SALPINGECTOMY BILATERAL Bilateral 1/20/2017    Procedure: LAPAROSCOPIC HYSTERECTOMY TOTAL, SALPINGECTOMY BILATERAL;  Surgeon: Rosy Ryan MD;  [facility]     ODONTECTOMY N/A 1/10/2018    Procedure: ODONTECTOMY;   Extraction of Teeth # 16, 17, 3;  Surgeon: Cezar Fischer DDS;  [facility]     PE TUBES       PICC INSERTION      removed 2/25/09     Obstetric History     No data available          ROS:  CONSTITUTIONAL: NEGATIVE for fever, chills, change in weight  INTEGUMENTARY/SKIN: NEGATIVE for worrisome rashes, moles or lesions  EYES: NEGATIVE for vision changes or irritation  ENT: NEGATIVE for ear, mouth and throat problems  RESP: NEGATIVE for significant cough or SOB  BREAST: NEGATIVE for masses, tenderness or discharge  CV: NEGATIVE for chest pain, palpitations or peripheral edema  GI: NEGATIVE for nausea, abdominal pain, heartburn, or change in bowel habits  : NEGATIVE for unusual urinary or vaginal symptoms. No vaginal bleeding.  MUSCULOSKELETAL: NEGATIVE for significant arthralgias or myalgia  NEURO: NEGATIVE for weakness, dizziness or paresthesias  PSYCHIATRIC: NEGATIVE for changes in mood or affect     OBJECTIVE:   /71   Pulse 127   Temp 98.2  F (36.8  C) (Tympanic)   Resp 16   Ht 1.499 m (4' 11\")   Wt 46.3 kg (102 lb)   LMP 01/20/2017   SpO2 98%   Breastfeeding? No   BMI 20.60 kg/m    EXAM:  GENERAL: healthy, alert and no distress  HENT: ear canals and TM's normal, nose and mouth without ulcers or lesions  NECK: no adenopathy, no asymmetry, masses, or scars and thyroid normal to palpation  RESP: lungs clear to auscultation - no rales, rhonchi or wheezes  CV: regular rate and rhythm, normal S1 S2, no S3 or S4, no murmur, click or rub, no peripheral edema and peripheral pulses strong  ABDOMEN: soft, " "nontender, no hepatosplenomegaly, no masses and bowel sounds normal  MS: no gross musculoskeletal defects noted, no edema  SKIN: no suspicious lesions or rashes  NEURO: Normal strength and tone        ASSESSMENT/PLAN:   1. Need for Tdap vaccination    - TDAP, IM (10 - 64 YRS) - Adacel    2. Routine general medical examination at a health care facility      3. Essential thrombocythemia (H)  Continue to follow with specialist      COUNSELING:   Reviewed preventive health counseling, as reflected in patient instructions       Healthy diet/nutrition    BP Readings from Last 1 Encounters:   09/18/18 (!) 132/91     Estimated body mass index is 19.54 kg/m  as calculated from the following:    Height as of 9/18/18: 1.499 m (4' 11.02\").    Weight as of 9/18/18: 43.9 kg (96 lb 12.8 oz).           reports that  has never smoked. she has never used smokeless tobacco.      Counseling Resources:  ATP IV Guidelines  Pooled Cohorts Equation Calculator  Breast Cancer Risk Calculator  FRAX Risk Assessment  ICSI Preventive Guidelines  Dietary Guidelines for Americans, 2010  [employer]'s MyPlate  ASA Prophylaxis  Lung CA Screening    Alice Mayers PA-C  [facility] [place]  Answers for HPI/ROS submitted by the patient on 3/1/2019   Annual Exam:  Frequency of exercise:: None  Getting at least 3 servings of Calcium per day:: Yes  Diet:: Regular (no restrictions)  Taking medications regularly:: Yes  Medication side effects:: None  Bi-annual eye exam:: NO  Dental care twice a year:: Yes  Sleep apnea or symptoms of sleep apnea:: None  Negative for the following: abdominal pain, Blood in stool, Blood in urine, chest pain, chills, congestion, constipation, cough, diarrhea, dizziness, ear pain, eye pain, nervous/anxious, fever, frequency, genital sores, headaches, hearing loss, heartburn, arthralgias, joint swelling, peripheral edema, mood changes, myalgias, nausea, dysuria, palpitations, Skin sensation changes, sore throat, urgency, " rash, shortness of breath, visual disturbance, weakness

## 2019-03-01 ENCOUNTER — OFFICE VISIT (OUTPATIENT)
Dept: FAMILY MEDICINE | Facility: CLINIC | Age: 24
End: 2019-03-01
Payer: COMMERCIAL

## 2019-03-01 ENCOUNTER — TELEPHONE (OUTPATIENT)
Dept: FAMILY MEDICINE | Facility: CLINIC | Age: 24
End: 2019-03-01

## 2019-03-01 VITALS
SYSTOLIC BLOOD PRESSURE: 135 MMHG | OXYGEN SATURATION: 98 % | DIASTOLIC BLOOD PRESSURE: 71 MMHG | HEIGHT: 59 IN | WEIGHT: 102 LBS | RESPIRATION RATE: 16 BRPM | HEART RATE: 127 BPM | TEMPERATURE: 98.2 F | BODY MASS INDEX: 20.56 KG/M2

## 2019-03-01 DIAGNOSIS — Z00.00 ROUTINE GENERAL MEDICAL EXAMINATION AT A HEALTH CARE FACILITY: Primary | ICD-10-CM

## 2019-03-01 DIAGNOSIS — Z23 NEED FOR TDAP VACCINATION: ICD-10-CM

## 2019-03-01 DIAGNOSIS — D47.3 ESSENTIAL THROMBOCYTHEMIA (H): ICD-10-CM

## 2019-03-01 PROCEDURE — 90715 TDAP VACCINE 7 YRS/> IM: CPT | Performed by: PHYSICIAN ASSISTANT

## 2019-03-01 PROCEDURE — 90471 IMMUNIZATION ADMIN: CPT | Performed by: PHYSICIAN ASSISTANT

## 2019-03-01 PROCEDURE — 99395 PREV VISIT EST AGE 18-39: CPT | Mod: 25 | Performed by: PHYSICIAN ASSISTANT

## 2019-03-01 ASSESSMENT — ENCOUNTER SYMPTOMS
HEMATOCHEZIA: 0
ABDOMINAL PAIN: 0

## 2019-03-01 ASSESSMENT — MIFFLIN-ST. JEOR: SCORE: 1128.3

## 2019-03-01 NOTE — TELEPHONE ENCOUNTER
Patient was seen today and got a tetnus shot, but they did not get an updated copy of Immunizations. Please mail a current copy to home Address. Ok to leave a detailed message.  .

## 2019-03-01 NOTE — NURSING NOTE
Screening Questionnaire for Adult Immunization     Are you sick today?   No    Do you have allergies to medications, food or any vaccine?   No    Have you ever had a serious reaction after receiving a vaccination?   No    Do you have a long-term health problem with heart disease, lung disease,  asthma, kidney disease, diabetes, anemia, metabolic or blood disease?   No    Do you have cancer, leukemia, AIDS, or any immune system problem?   No    Do you take cortisone, prednisone, other steroids, or anticancer drugs, or  have you had any x-ray (radiation) treatments?   No    Have you had a seizure, brain, or other nervous system problem?   No    During the past year, have you received a transfusion of blood or blood       products, or been given a medicine called immune (gamma) globulin?   No    For women: Are you pregnant or is there a chance you could become         pregnant during the next month?   No    Have you received any vaccinations in the past 4 weeks?   No     Immunization questionnaire answers were all negative.     Screening performed by Jacy Graves on 3/1/2019 at 11:30 AM.    Prior to injection, verified patient identity using patient's name and date of birth.    Assisted Alysia Hatfield MA and Jane Osullivan MA in giving pt Tdap vaccine today. Pt was very combative and it took 2 tries to successfully administer Tdap. Pt did move quite a bit during the injection. The injection site(RT Deltoid) did bleed after injection was done for a couple seconds. Applied pressure and bleeding stopped. Area was cleaned and bandage was applied. Pt guardian was present during injection as well.  Jacy Graves CMA

## 2019-03-08 ENCOUNTER — TELEPHONE (OUTPATIENT)
Dept: FAMILY MEDICINE | Facility: CLINIC | Age: 24
End: 2019-03-08

## 2019-03-08 NOTE — TELEPHONE ENCOUNTER
Received form from [facility], patient last physical was 3/1/19 with Alice ARORA, placed in providers basket. Debbi Scott TC

## 2019-03-08 NOTE — TELEPHONE ENCOUNTER
Done as much as a I could. I don't know what they want for restraints or if they even want that part filled out. Please print and attach medication list if needed they ask for it. Thanks armand

## 2019-03-11 NOTE — TELEPHONE ENCOUNTER
Alice this was placed back in your basket, as page 5 needs a box checked and then signed, and then page 6 if applicable needs signature if seizers are part of her history.   MARITZA Monterroso

## 2019-03-20 DIAGNOSIS — K59.00 CONSTIPATION, UNSPECIFIED CONSTIPATION TYPE: ICD-10-CM

## 2019-03-20 RX ORDER — DOCUSATE SODIUM 100 MG/1
CAPSULE, LIQUID FILLED ORAL
Qty: 31 CAPSULE | Refills: 11 | Status: SHIPPED | OUTPATIENT
Start: 2019-03-20

## 2019-03-21 ENCOUNTER — MEDICAL CORRESPONDENCE (OUTPATIENT)
Dept: HEALTH INFORMATION MANAGEMENT | Facility: CLINIC | Age: 24
End: 2019-03-21

## 2019-04-02 ENCOUNTER — ONCOLOGY VISIT (OUTPATIENT)
Dept: ONCOLOGY | Facility: CLINIC | Age: 24
End: 2019-04-02
Attending: INTERNAL MEDICINE
Payer: COMMERCIAL

## 2019-04-02 ENCOUNTER — APPOINTMENT (OUTPATIENT)
Dept: LAB | Facility: CLINIC | Age: 24
End: 2019-04-02
Attending: INTERNAL MEDICINE
Payer: COMMERCIAL

## 2019-04-02 VITALS
WEIGHT: 103.8 LBS | TEMPERATURE: 97.1 F | BODY MASS INDEX: 20.97 KG/M2 | DIASTOLIC BLOOD PRESSURE: 82 MMHG | OXYGEN SATURATION: 100 % | SYSTOLIC BLOOD PRESSURE: 136 MMHG | RESPIRATION RATE: 16 BRPM | HEART RATE: 110 BPM

## 2019-04-02 DIAGNOSIS — D47.3 ESSENTIAL THROMBOCYTHEMIA (H): Primary | ICD-10-CM

## 2019-04-02 DIAGNOSIS — D47.3 ESSENTIAL THROMBOCYTHEMIA (H): ICD-10-CM

## 2019-04-02 PROCEDURE — G0463 HOSPITAL OUTPT CLINIC VISIT: HCPCS | Mod: ZF

## 2019-04-02 PROCEDURE — 36415 COLL VENOUS BLD VENIPUNCTURE: CPT

## 2019-04-02 PROCEDURE — 99213 OFFICE O/P EST LOW 20 MIN: CPT | Mod: ZP | Performed by: INTERNAL MEDICINE

## 2019-04-02 RX ORDER — LORAZEPAM 0.5 MG/1
TABLET ORAL
Qty: 10 TABLET | Refills: 0 | Status: SHIPPED | OUTPATIENT
Start: 2019-04-02 | End: 2020-07-16

## 2019-04-02 NOTE — NURSING NOTE
"Oncology Rooming Note    April 2, 2019 9:52 AM   Pavithra Wing is a 24 year old female who presents for:    Chief Complaint   Patient presents with     Blood Draw     labs drawn with vpt by rn.  vs taken     Oncology Clinic Visit     Return Thrombocytosis     Initial Vitals: /82 (BP Location: Right arm, Patient Position: Sitting, Cuff Size: Adult Regular)   Pulse 110   Temp 97.1  F (36.2  C) (Tympanic)   Resp 16   Wt 47.1 kg (103 lb 12.8 oz)   LMP 01/20/2017   SpO2 100%   BMI 20.97 kg/m   Estimated body mass index is 20.97 kg/m  as calculated from the following:    Height as of 3/1/19: 1.499 m (4' 11\").    Weight as of this encounter: 47.1 kg (103 lb 12.8 oz). Body surface area is 1.4 meters squared.  Data Unavailable Comment: pt unable to answer; caregivers indicate no pain   Patient's last menstrual period was 01/20/2017.  Allergies reviewed: Yes  Medications reviewed: Yes    Medications: Medication refills not needed today.  Pharmacy name entered into [employer]:    [id number] - [place], [place] - [address]  AT [address] OF [facility] [address] [address] [facility] - [place] - [address] [facility]  [facility] - [place], [place] - [address]    Clinical concerns: Lab was unable to draw blood; Discuss the Xanax for PRN. It's not working.       Elvia Cartwright, [facility]              "

## 2019-04-02 NOTE — LETTER
2019       RE: Dayton Wing  [address] [facility] [address] [facility] [address]     Dear Colleague,    Thank you for referring your patient, Dayton Wing, to the [facility]. Please see a copy of my visit note below.        [facility] Hematology Follow Up Visit    Outpatient Visit Note:    Patient: Dayton Wing  MRN: [id number]  : 1995  DERRELL: 2019    Dayton Wing is a 23 year old woman with a history of severe cognitive impairment, blindness with CALR+, very low risk, ET on observation alone, who returns for routine follow up.      Forward History:  2017     - Thrombocytosis noted after hysterectomy for menorrhagia.  No significant postop bleeding.    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 Range 2017 08:21   Factor 8 Assay Latest Ref Range: 55 - 200 % 118   von Willebrand Antigen Latest Ref Range: 50 - 200 % 126   Ristocetin Cofactor Activity Unknown 53   von Willebrand Factor Activity Latest Ref Range: 50 - 180 % 81       Visit History:  Dayton returns today for a routine follow up visit. She is accompanied by 2 people from her group home.  Father (Anselmo) usually accompanies her to visits but was not available today.  They both report that Dayton is doing well. No concerns for bleeding or clotting. Specifically, no epistaxis, bruising, hematuria, melena/hematochezia, abdominal pain, or arm/leg swelling.     Fortunately we were not able to do a blood count today because she was combative and unable to perform any testing.  Of note she has had severe anxiety and and also been combative with prior lab draws which makes it difficult to interpret the rise in platelet count is potentially reactive rather than worsening of the disease.    Medications:  Current Outpatient  Medications   Medication Sig Dispense Refill     ALPRAZolam (XANAX) 0.5 MG tablet Take 2 tablets (1 mg) by mouth as needed for anxiety (for anxiety with blood draws) 10 tablet 0     CALCIUM 500/D 500-400 MG-UNIT CHEW         MG capsule TAKE 1 CAPSULE BY MOUTH ONCE DAILY 31 capsule 11     fexofenadine (ALLEGRA) 180 MG tablet daily        Fiber, Guar Gum, CHEW Take 2.5 g by mouth 2 times daily       LORazepam (ATIVAN) 0.5 MG tablet Give 1 tab 30-60 minutes prior blood draw or injections.  May repeat once if needed 10 tablet 0     MULTI-VITAMIN OR TABS 1 DAILY       acetaminophen (TYLENOL) 325 MG tablet Take 2 tablets (650 mg) by mouth every 4 hours as needed for mild pain (Patient not taking: Reported on 4/2/2019) 100 tablet 0     ibuprofen (ADVIL/MOTRIN) 600 MG tablet Take 1 tablet (600 mg) by mouth every 6 hours as needed for pain or fever (mild to moderate pain, or temperature greater than 102 F) (Patient not taking: Reported on 3/1/2019) 30 tablet 0        Allergies:  Allergies   Allergen Reactions     Benzalkonium Chloride Rash       ROS:  A 10 point ROS is negative except as stated in the HPI    Objective:  /82 (BP Location: Right arm, Patient Position: Sitting, Cuff Size: Adult Regular)   Pulse 110   Temp 97.1  F (36.2  C) (Tympanic)   Resp 16   Wt 47.1 kg (103 lb 12.8 oz)   LMP 01/20/2017   SpO2 100%   BMI 20.97 kg/m     Exam:   Gen: Appears well, no distress.  She does not make eye contact and is unable to communicate  HEENT: no scleral icterus or hemorrhage, no wet purpura, no lymphadenopathy  Ext: no swelling of arms or legs  Skin: no hematomas or ecchymoses    Labs:  None today    Imaging:  none    Assessment:  Pavithra Wing is a 24 year old woman with a history of severe cognitive impairment, blindness with CALR+ ET (very low risk) who returns for routine follow up. No evidence of hyperviscosity or bleeding (acquired vWD)    Plan:  1. Follow-up in 6 months, with repeat  blood counts the week prior   2.  We will change to lorazepam 0.5-1 mg prior to lab draws  3.. They will call sooner if she develops concerning symptoms such as bleeding or evidence of thrombus    Total Time Spent:  I spent a total of 15 minutes face-to-face with Pavithra Wing during today's office visit.  Over 50% of this time was spent counseling the patient and/or coordinating care regarding ET.    Taurus Myrick MD   of Medicine  [facility] [employer]

## 2019-04-02 NOTE — PROGRESS NOTES
[facility] Hematology Follow Up Visit    Outpatient Visit Note:    Patient: Dayton Wing  MRN: [id number]  : 1995  DERRELL: 2019    Dayton Wing is a 23 year old woman with a history of severe cognitive impairment, blindness with CALR+, very low risk, ET on observation alone, who returns for routine follow up.      Forward History:  2017     - Thrombocytosis noted after hysterectomy for menorrhagia.  No significant postop bleeding.    2017    - CALR mutation detected, acquired vWD but no vWD is still in normal range, missing HMWM  Results for DAYTON WING (MRN [id number]) as of 2018 13:52   Ref. Range 2017 08:21   Factor 8 Assay Latest Ref Range: 55 - 200 % 118   von Willebrand Antigen Latest Ref Range: 50 - 200 % 126   Ristocetin Cofactor Activity Unknown 53   von Willebrand Factor Activity Latest Ref Range: 50 - 180 % 81       Visit History:  Dayton returns today for a routine follow up visit. She is accompanied by 2 people from her group home.  Father (Anselmo) usually accompanies her to visits but was not available today.  They both report that Dayton is doing well. No concerns for bleeding or clotting. Specifically, no epistaxis, bruising, hematuria, melena/hematochezia, abdominal pain, or arm/leg swelling.     Fortunately we were not able to do a blood count today because she was combative and unable to perform any testing.  Of note she has had severe anxiety and and also been combative with prior lab draws which makes it difficult to interpret the rise in platelet count is potentially reactive rather than worsening of the disease.    Medications:  Current Outpatient Medications   Medication Sig Dispense Refill     ALPRAZolam (XANAX) 0.5 MG tablet Take 2 tablets (1 mg) by mouth as needed for anxiety (for anxiety with blood draws) 10 tablet 0     CALCIUM 500/D 500-400 MG-UNIT CHEW         MG capsule TAKE 1 CAPSULE  BY MOUTH ONCE DAILY 31 capsule 11     fexofenadine (ALLEGRA) 180 MG tablet daily        Fiber, Guar Gum, CHEW Take 2.5 g by mouth 2 times daily       LORazepam (ATIVAN) 0.5 MG tablet Give 1 tab 30-60 minutes prior blood draw or injections.  May repeat once if needed 10 tablet 0     MULTI-VITAMIN OR TABS 1 DAILY       acetaminophen (TYLENOL) 325 MG tablet Take 2 tablets (650 mg) by mouth every 4 hours as needed for mild pain (Patient not taking: Reported on 4/2/2019) 100 tablet 0     ibuprofen (ADVIL/MOTRIN) 600 MG tablet Take 1 tablet (600 mg) by mouth every 6 hours as needed for pain or fever (mild to moderate pain, or temperature greater than 102 F) (Patient not taking: Reported on 3/1/2019) 30 tablet 0        Allergies:  Allergies   Allergen Reactions     Benzalkonium Chloride Rash       ROS:  A 10 point ROS is negative except as stated in the HPI    Objective:  /82 (BP Location: Right arm, Patient Position: Sitting, Cuff Size: Adult Regular)   Pulse 110   Temp 97.1  F (36.2  C) (Tympanic)   Resp 16   Wt 47.1 kg (103 lb 12.8 oz)   LMP 01/20/2017   SpO2 100%   BMI 20.97 kg/m    Exam:   Gen: Appears well, no distress.  She does not make eye contact and is unable to communicate  HEENT: no scleral icterus or hemorrhage, no wet purpura, no lymphadenopathy  Ext: no swelling of arms or legs  Skin: no hematomas or ecchymoses    Labs:  None today    Imaging:  none    Assessment:  Pavithra Wing is a 24 year old woman with a history of severe cognitive impairment, blindness with CALR+ ET (very low risk) who returns for routine follow up. No evidence of hyperviscosity or bleeding (acquired vWD)    Plan:  1. Follow-up in 6 months, with repeat blood counts the week prior   2.  We will change to lorazepam 0.5-1 mg prior to lab draws  3.. They will call sooner if she develops concerning symptoms such as bleeding or evidence of thrombus    Total Time Spent:  I spent a total of 15 minutes face-to-face with  Pavithra Wing during today's office visit.  Over 50% of this time was spent counseling the patient and/or coordinating care regarding ET.    Taurus Myrick MD   of Medicine  [facility] [employer]

## 2019-04-02 NOTE — NURSING NOTE
Chief Complaint   Patient presents with     Blood Draw     unable to get labs--provider informed.  vs taken     Oncology Clinic Visit     Return Thrombocytosis     Despite several people attempting to hold pt to get labs, we were unable to get them as pt is very strong.  MD informed.  VS taken and pt checked in for MD appt.    Mouna Knox, RN

## 2019-06-18 ENCOUNTER — TELEPHONE (OUTPATIENT)
Dept: PEDIATRICS | Facility: CLINIC | Age: 24
End: 2019-06-18

## 2019-06-18 NOTE — TELEPHONE ENCOUNTER
Received letter from [facility].  Rx; sexofenadine will become non preferred 7-1-19.  Please select a preferred drug instead. please call zeenat back. Caller informed that calls received after 3pm may not be returned same day.  Uses [facility].  Thank you

## 2019-06-18 NOTE — TELEPHONE ENCOUNTER
fexofenadine (ALLEGRA) 180 MG tablet is a historical/pt-reported medication on current medication list.    Please review and advise regarding request for alternate order based on anticipated non-formulary status.    SINAN AllenN, RN

## 2019-06-19 NOTE — TELEPHONE ENCOUNTER
What is preferred for the patient? They will need to find out through insurance.  Allegra is just over the counter so they dont actually need an Rx for that.     armand

## 2019-06-21 NOTE — TELEPHONE ENCOUNTER
Left message on answering machine for patient care staff to call back to [phone].  Jennifer MENONN, RN

## 2019-06-21 NOTE — TELEPHONE ENCOUNTER
Lorie notified of provider message as written.  Repeated pt can pay cash and get otc or can contact insurance to see what equivalent med is on formulary and let Alice BENITEZ know.  She verbalized understanding.  Jennifer Baptiste BSN, RN

## 2019-06-25 ENCOUNTER — TELEPHONE (OUTPATIENT)
Dept: FAMILY MEDICINE | Facility: CLINIC | Age: 24
End: 2019-06-25

## 2019-06-25 DIAGNOSIS — H10.45 CHRONIC ALLERGIC CONJUNCTIVITIS: Primary | ICD-10-CM

## 2019-06-25 DIAGNOSIS — J30.1 SEASONAL ALLERGIC RHINITIS DUE TO POLLEN: ICD-10-CM

## 2019-06-25 NOTE — TELEPHONE ENCOUNTER
Adelina states [facility] states fexofenadine is non preferred. Could she switch to Loratadine.  The group home needs a copy of the prescription for there records.  Thank You.

## 2019-06-26 RX ORDER — LORATADINE 10 MG/1
10 TABLET ORAL DAILY
Qty: 90 TABLET | Refills: 3 | Status: SHIPPED | OUTPATIENT
Start: 2019-06-26

## 2019-06-27 ENCOUNTER — TELEPHONE (OUTPATIENT)
Dept: FAMILY MEDICINE | Facility: CLINIC | Age: 24
End: 2019-06-27

## 2019-06-27 NOTE — TELEPHONE ENCOUNTER
[facility] is calling to get the order to discontinue the fexofenadine before they can start the new medication    Please fax this order to [phone]    Thank you

## 2019-07-01 NOTE — TELEPHONE ENCOUNTER
[facility] called and said they did not receive the fax.  Faxed order again.  Jennifer Baptiste BSN, RN

## 2019-07-10 ENCOUNTER — MEDICAL CORRESPONDENCE (OUTPATIENT)
Dept: HEALTH INFORMATION MANAGEMENT | Facility: CLINIC | Age: 24
End: 2019-07-10

## 2019-07-23 ENCOUNTER — MEDICAL CORRESPONDENCE (OUTPATIENT)
Dept: HEALTH INFORMATION MANAGEMENT | Facility: CLINIC | Age: 24
End: 2019-07-23

## 2019-09-03 DIAGNOSIS — D47.3 ESSENTIAL THROMBOCYTHEMIA (H): Primary | ICD-10-CM

## 2019-09-03 LAB
BASOPHILS # BLD AUTO: 0.1 10E9/L (ref 0–0.2)
BASOPHILS NFR BLD AUTO: 0.9 %
DIFFERENTIAL METHOD BLD: ABNORMAL
EOSINOPHIL # BLD AUTO: 0 10E9/L (ref 0–0.7)
EOSINOPHIL NFR BLD AUTO: 0 %
ERYTHROCYTE [DISTWIDTH] IN BLOOD BY AUTOMATED COUNT: 14.8 % (ref 10–15)
FERRITIN SERPL-MCNC: 32 NG/ML (ref 12–150)
HCT VFR BLD AUTO: 44 % (ref 35–47)
HGB BLD-MCNC: 13.8 G/DL (ref 11.7–15.7)
LYMPHOCYTES # BLD AUTO: 4.4 10E9/L (ref 0.8–5.3)
LYMPHOCYTES NFR BLD AUTO: 32.2 %
MCH RBC QN AUTO: 24 PG (ref 26.5–33)
MCHC RBC AUTO-ENTMCNC: 31.4 G/DL (ref 31.5–36.5)
MCV RBC AUTO: 76 FL (ref 78–100)
MONOCYTES # BLD AUTO: 1.3 10E9/L (ref 0–1.3)
MONOCYTES NFR BLD AUTO: 9.5 %
NEUTROPHILS # BLD AUTO: 7.8 10E9/L (ref 1.6–8.3)
NEUTROPHILS NFR BLD AUTO: 57.4 %
PLATELET # BLD AUTO: 2541 10E9/L (ref 150–450)
PLATELET # BLD EST: ABNORMAL 10*3/UL
RBC # BLD AUTO: 5.76 10E12/L (ref 3.8–5.2)
RBC MORPH BLD: NORMAL
WBC # BLD AUTO: 13.6 10E9/L (ref 4–11)

## 2019-09-03 PROCEDURE — 82728 ASSAY OF FERRITIN: CPT | Performed by: INTERNAL MEDICINE

## 2019-09-03 PROCEDURE — 85025 COMPLETE CBC W/AUTO DIFF WBC: CPT | Performed by: INTERNAL MEDICINE

## 2019-09-03 PROCEDURE — 36415 COLL VENOUS BLD VENIPUNCTURE: CPT
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
## 2019-09-03 NOTE — NURSING NOTE
Chief Complaint   Patient presents with     Blood Draw     venipuncture done by MARIA INES Real CMA on 9/3/2019 at 10:47 AM

## 2019-09-04 ENCOUNTER — TELEPHONE (OUTPATIENT)
Dept: FAMILY MEDICINE | Facility: CLINIC | Age: 24
End: 2019-09-04

## 2019-09-04 DIAGNOSIS — R62.50 DEVELOPMENT DELAY: Primary | ICD-10-CM

## 2019-09-04 NOTE — TELEPHONE ENCOUNTER
Claudia calling from [facility] (Care Coordination) Patient is needing an order/referral to [facility] dental cleaning for dental care under anesthesia due to diagnosis F70, H54.2X22, and H90.2    They would also like to do any labs that are needed, a flu shot and any other recommended vaccinations.     Please Fax to [phone]. Call with questions.

## 2019-09-05 NOTE — TELEPHONE ENCOUNTER
I would have them call their oncologist/hematologist and see if they want to order labs that way, it looks like they wanted some but were unable to get them.     As far as our labs go, if she is fasting that day we should get lipids and a CMP.  I will place future labs maybe just print the order and send it to the referral? I am not sure how to order labs.  A flu shot would be good yes. I will put in referral again with that.     Alice

## 2019-09-05 NOTE — TELEPHONE ENCOUNTER
Referral done, they would need an ancillary appointment for vaccines if appropriate.   If they need a preop they need an appointment.   She shouldn't need labs for cleaning.     armand

## 2019-09-05 NOTE — TELEPHONE ENCOUNTER
Faxed referral to [facility] @ [phone].    Left message for Claudia to call me back.Michela Blair MA/MARITZA

## 2019-09-05 NOTE — TELEPHONE ENCOUNTER
Claudia called back. She said that they are wondering if anything that is needed, like immunization or blood work can be done under anesthesia, while she is getting her teeth cleaned. This won't be scheduled until February or March. So, they want anything that would be needed ordered in the referral, any upcoming labs needed? Flue shot?Michela Blair MA/TC

## 2019-09-05 NOTE — TELEPHONE ENCOUNTER
Faxed new referral with the added flu shot and labs to [facility]. Called and informed Claudia that this was done and gave her the information for the oncologist/hematologist to contact them.Michela Blair MA/MARITZA

## 2019-09-10 ENCOUNTER — ONCOLOGY VISIT (OUTPATIENT)
Dept: ONCOLOGY | Facility: CLINIC | Age: 24
End: 2019-09-10
Attending: INTERNAL MEDICINE
Payer: COMMERCIAL

## 2019-09-10 VITALS — BODY MASS INDEX: 22.22 KG/M2 | WEIGHT: 110 LBS

## 2019-09-10 DIAGNOSIS — D47.3 ESSENTIAL THROMBOCYTHEMIA (H): Primary | ICD-10-CM

## 2019-09-10 DIAGNOSIS — D68.04 ACQUIRED VON WILLEBRAND DISEASE (H): ICD-10-CM

## 2019-09-10 DIAGNOSIS — R62.50 DEVELOPMENT DELAY: ICD-10-CM

## 2019-09-10 PROCEDURE — 99213 OFFICE O/P EST LOW 20 MIN: CPT | Mod: ZP | Performed by: INTERNAL MEDICINE

## 2019-09-10 PROCEDURE — G0463 HOSPITAL OUTPT CLINIC VISIT: HCPCS | Mod: ZF

## 2019-09-10 ASSESSMENT — PAIN SCALES - GENERAL: PAINLEVEL: NO PAIN (0)

## 2019-09-10 NOTE — PROGRESS NOTES
[facility] Hematology Follow Up Visit    Outpatient Visit Note:    Patient: Dayton Wing  MRN: [id number]  : 1995  DERRELL: Sep 10, 2019    Dayton Wing is a 24 year old woman with a history of severe cognitive impairment and blindness with CALR+, very low risk, ET on observation alone, who returns for routine follow up.      Forward History:  2017     - Thrombocytosis noted after hysterectomy for menorrhagia.  No significant postop bleeding.    2017    - CALR mutation detected, acquired vWD but no vWD is still in normal range, missing HMWM  Results for DAYTON WING (MRN [id number]) as of 2018 13:52   Ref. Range 2017 08:21   Factor 8 Assay Latest Ref Range: 55 - 200 % 118   von Willebrand Antigen Latest Ref Range: 50 - 200 % 126   Ristocetin Cofactor Activity Unknown 53   von Willebrand Factor Activity Latest Ref Range: 50 - 180 % 81       Visit History:  Dayton returns today for a routine follow up visit. She is accompanied by her caretaker from her group home.  Father (Anselmo) usually accompanies her to visits but was not available today.  They both report that Dayton is doing well. No concerns for bleeding or clotting. Specifically, no epistaxis, bruising, hematuria, melena/hematochezia, abdominal pain, or arm/leg swelling.     We have not been able to accomplish blood draws as she gets very stressed and combative, even with 1 mg of Ativan.    The only plan for the next year for procedures are a sedated dental exam.  She has not been having obvious tooth pain.    Medications:  Current Outpatient Medications   Medication Sig Dispense Refill     acetaminophen (TYLENOL) 325 MG tablet Take 2 tablets (650 mg) by mouth every 4 hours as needed for mild pain 100 tablet 0     CALCIUM 500/D 500-400 MG-UNIT CHEW         MG capsule TAKE 1 CAPSULE BY MOUTH ONCE DAILY 31 capsule 11     Fiber, Guar Gum, CHEW Take 2.5 g by mouth 2 times  daily       ibuprofen (ADVIL/MOTRIN) 600 MG tablet Take 1 tablet (600 mg) by mouth every 6 hours as needed for pain or fever (mild to moderate pain, or temperature greater than 102 F) 30 tablet 0     loratadine (CLARITIN) 10 MG tablet Take 1 tablet (10 mg) by mouth daily 90 tablet 3     LORazepam (ATIVAN) 0.5 MG tablet Give 1 tab 30-60 minutes prior blood draw or injections.  May repeat once if needed 10 tablet 0     MULTI-VITAMIN OR TABS 1 DAILY          Allergies:  Allergies   Allergen Reactions     Benzalkonium Chloride Rash       ROS:  A 10 point ROS is negative except as stated in the HPI    Objective:  BP (!) (P) 133/94   Pulse (P) 118   Temp (P) 98  F (36.7  C) (Tympanic)   Resp (P) 14   Wt 49.9 kg (110 lb)   LMP 01/20/2017   SpO2 (P) 99%   BMI 22.22 kg/m    Exam:   Gen: Appears well, no distress.  She does not make eye contact and is unable to communicate  HEENT: no scleral icterus or hemorrhage, no wet purpura, no lymphadenopathy  Ext: no swelling of arms or legs  Skin: no hematomas or ecchymoses     Labs:  None today    Imaging:  none    Assessment:  Pavithra Wing is a 24 year old woman with a history of severe cognitive impairment, blindness with CALR+ ET (very low risk) who returns for routine follow up. No evidence of hyperviscosity or bleeding (acquired vWD)    Plan:  1. Follow-up in 1 year  2.  With the dental procedure, if she will have an extraction, she should receive Amicar 4G during the procedure and then TID x 5 days thereafter.  Happy to discuss with her dentist.  3. They will call sooner if she develops concerning symptoms such as bleeding or evidence of thrombus    Total Time Spent:  I spent a total of 15 minutes face-to-face with Pavithra Wing during today's office visit.  Over 50% of this time was spent counseling the patient and/or coordinating care regarding ET.    Taurus Myrick MD   of Medicine  [facility] [employer]  [employer]

## 2019-09-10 NOTE — LETTER
9/10/2019       RE: Dayton Wing  [address] [facility] [address]     Dear Colleague,    Thank you for referring your patient, Dayton Wing, to the [facility]. Please see a copy of my visit note below.        [facility] Hematology Follow Up Visit    Outpatient Visit Note:    Patient: Dayton Wing  MRN: [id number]  : 1995  DERRELL: Sep 10, 2019    Dayton Wing is a 24 year old woman with a history of severe cognitive impairment and blindness with CALR+, very low risk, ET on observation alone, who returns for routine follow up.      Forward History:  2017     - Thrombocytosis noted after hysterectomy for menorrhagia.  No significant postop bleeding.    2017    - CALR mutation detected, acquired vWD but no vWD is still in normal range, missing HMWM  Results for DAYTON WING (MRN [id number]) as of 2018 13:52   Ref. Range 2017 08:21   Factor 8 Assay Latest Ref Range: 55 - 200 % 118   von Willebrand Antigen Latest Ref Range: 50 - 200 % 126   Ristocetin Cofactor Activity Unknown 53   von Willebrand Factor Activity Latest Ref Range: 50 - 180 % 81       Visit History:  Dayton returns today for a routine follow up visit. She is accompanied by her caretaker from her group home.  Father (Anselmo) usually accompanies her to visits but was not available today.  They both report that Dayton is doing well. No concerns for bleeding or clotting. Specifically, no epistaxis, bruising, hematuria, melena/hematochezia, abdominal pain, or arm/leg swelling.     We have not been able to accomplish blood draws as she gets very stressed and combative, even with 1 mg of Ativan.    The only plan for the next year for procedures are a sedated dental exam.  She has not been having obvious tooth pain.    Medications:  Current Outpatient Medications   Medication Sig Dispense Refill     acetaminophen (TYLENOL) 325 MG  tablet Take 2 tablets (650 mg) by mouth every 4 hours as needed for mild pain 100 tablet 0     CALCIUM 500/D 500-400 MG-UNIT CHEW         MG capsule TAKE 1 CAPSULE BY MOUTH ONCE DAILY 31 capsule 11     Fiber, Guar Gum, CHEW Take 2.5 g by mouth 2 times daily       ibuprofen (ADVIL/MOTRIN) 600 MG tablet Take 1 tablet (600 mg) by mouth every 6 hours as needed for pain or fever (mild to moderate pain, or temperature greater than 102 F) 30 tablet 0     loratadine (CLARITIN) 10 MG tablet Take 1 tablet (10 mg) by mouth daily 90 tablet 3     LORazepam (ATIVAN) 0.5 MG tablet Give 1 tab 30-60 minutes prior blood draw or injections.  May repeat once if needed 10 tablet 0     MULTI-VITAMIN OR TABS 1 DAILY          Allergies:  Allergies   Allergen Reactions     Benzalkonium Chloride Rash       ROS:  A 10 point ROS is negative except as stated in the HPI    Objective:  BP (!) (P) 133/94   Pulse (P) 118   Temp (P) 98  F (36.7  C) (Tympanic)   Resp (P) 14   Wt 49.9 kg (110 lb)   LMP 01/20/2017   SpO2 (P) 99%   BMI 22.22 kg/m     Exam:   Gen: Appears well, no distress.  She does not make eye contact and is unable to communicate  HEENT: no scleral icterus or hemorrhage, no wet purpura, no lymphadenopathy  Ext: no swelling of arms or legs  Skin: no hematomas or ecchymoses     Labs:  None today    Imaging:  none    Assessment:  Pavithra Wing is a 24 year old woman with a history of severe cognitive impairment, blindness with CALR+ ET (very low risk) who returns for routine follow up. No evidence of hyperviscosity or bleeding (acquired vWD)    Plan:  1. Follow-up in 1 year  2.  With the dental procedure, if she will have an extraction, she should receive Amicar 4G during the procedure and then TID x 5 days thereafter.  Happy to discuss with her dentist.  3. They will call sooner if she develops concerning symptoms such as bleeding or evidence of thrombus    Total Time Spent:  I spent a total of 15 minutes  face-to-face with Pavithra Wing during today's office visit.  Over 50% of this time was spent counseling the patient and/or coordinating care regarding ET.    Taurus Myrick MD   of Medicine  [facility] [employer]

## 2019-09-10 NOTE — NURSING NOTE
"Oncology Rooming Note    September 10, 2019 10:12 AM   Pavithra Wing is a 24 year old female who presents for:    Chief Complaint   Patient presents with     Oncology Clinic Visit     Return Thrombocytocythemia     Initial Vitals: BP (!) (P) 133/94   Pulse (P) 118   Temp (P) 98  F (36.7  C) (Tympanic)   Resp (P) 14   Wt 49.9 kg (110 lb)   LMP 01/20/2017   SpO2 (P) 99%   BMI 22.22 kg/m   Estimated body mass index is 22.22 kg/m  as calculated from the following:    Height as of 3/1/19: 1.499 m (4' 11\").    Weight as of this encounter: 49.9 kg (110 lb). Body surface area is 1.44 meters squared.  No Pain (0) Comment: Data Unavailable   Patient's last menstrual period was 01/20/2017.  Allergies reviewed: Yes  Medications reviewed: Yes    Medications: Medication refills not needed today.  Pharmacy name entered into [employer]: [facility] - [place], [place] - [address]    Clinical concerns: lab results; higher dose of Ativan for blood draws.      Elvia Cartwright, FÁTIMA              "

## 2019-10-14 NOTE — MR AVS SNAPSHOT
After Visit Summary   3/29/2017    Pavithra Wing    MRN: [id number]           Patient Information     Date Of Birth          1995        Visit Information        Provider Department      3/29/2017 5:15 PM Taurus Myrick MD; ASL IS [facility]        Today's Diagnoses     Thrombocytosis (H)    -  1       Follow-ups after your visit        Your next 10 appointments already scheduled     Apr 01, 2017  8:00 AM CDT   LAB with  LAB   [facility])    [address] [place] [id number] [phone]           Patient must bring picture ID.  Patient should be prepared to give a urine specimen  Please do not eat 10-12 hours before your appointment if you are coming in fasting for labs on lipids, cholesterol, or glucose (sugar).  Pregnant women should follow their Care Team instructions. Water with medications is okay. Do not drink coffee or other fluids.   If you have concerns about taking  your medications, please ask at office or if scheduling via [employer], send a message by clicking on Secure Messaging, Message Your Care Team.            Apr 26, 2017  5:30 PM CDT   (Arrive by 5:15 PM)   New Patient Visit with Taurus Myrick MD   [facility] ([facility])    [address] [place] [id number] [phone]              Who to contact     If you have questions or need follow up information about today's clinic visit or your schedule please contact [facility] directly at [phone].  Normal or non-critical lab and imaging results will be communicated to you by MyChart, letter or phone within 4 business days after the clinic has received the results. If you do not hear from us within 7 days, please contact the clinic through MyChart or phone. If you have a critical or abnormal lab result, we will notify you by phone  "as soon as possible.  Submit refill requests through [employer] or call your pharmacy and they will forward the refill request to us. Please allow 3 business days for your refill to be completed.          Additional Information About Your Visit        NowPublic Information     NowPublic lets you send messages to your doctor, view your test results, renew your prescriptions, schedule appointments and more. To sign up, go to [email or web address] . Click on \"Log in\" on the left side of the screen, which will take you to the Welcome page. Then click on \"Sign up Now\" on the right side of the page.     You will be asked to enter the access code listed below, as well as some personal information. Please follow the directions to create your username and password.     Your access code is: [email or web address]  Expires: 2017 10:00 AM     Your access code will  in 90 days. If you need help or a new code, please call your [facility] clinic or [phone].        Care EveryWhere ID     This is your Care EveryWhere ID. This could be used by other organizations to access your [facility] medical records  [id number]        Your Vitals Were     Pulse Temperature Respirations Height Last Period Pulse Oximetry    100 97.6  F (36.4  C) (Tympanic) 16 1.486 m (4' 10.5\") 2017 98%    BMI (Body Mass Index)                   21.65 kg/m2            Blood Pressure from Last 3 Encounters:   17 131/80   17 123/82   16 139/84    Weight from Last 3 Encounters:   17 47.8 kg (105 lb 6.4 oz)   17 47.2 kg (104 lb 1.6 oz)   17 47.1 kg (103 lb 13.4 oz)              We Performed the Following     *CBC with platelets differential     Blood morphology pathology review     Factor 8 assay     Next Generation Sequencing Oncology: Myeloproliferative Neoplasm Panel     Von Willebrand antigen     von Willebrand Interpretation     Von Willebrand Multimers     VWF Activity with reflex to Ristocetin Cofactor Activity        "   Today's Medication Changes          These changes are accurate as of: 3/29/17  6:56 PM.  If you have any questions, ask your nurse or doctor.               These medicines have changed or have updated prescriptions.        Dose/Directions    ALPRAZolam 0.5 MG tablet   Commonly known as:  XANAX   This may have changed:    - how much to take  - how to take this  - when to take this  - reasons to take this  - additional instructions   Used for:  Thrombocytosis (H)   Changed by:  Taurus Myrick MD        Dose:  0.5 mg   Take 1 tablet (0.5 mg) by mouth as needed for anxiety (for anxiety with blood draws)   Quantity:  10 tablet   Refills:  0         Stop taking these medicines if you haven't already. Please contact your care team if you have questions.     FIBER THERAPY Powd   Generic drug:  methylcellulose (laxative)   Stopped by:  Taurus Myrick MD                Where to get your medicines      Some of these will need a paper prescription and others can be bought over the counter.  Ask your nurse if you have questions.     Bring a paper prescription for each of these medications     ALPRAZolam 0.5 MG tablet                Primary Care Provider Office Phone # Fax #    SEB Lieberman [facility] [phone]       [facility] [address] [facility] [address]        Thank you!     Thank you for choosing [facility]  for your care. Our goal is always to provide you with excellent care. Hearing back from our patients is one way we can continue to improve our services. Please take a few minutes to complete the written survey that you may receive in the mail after your visit with us. Thank you!             Your Updated Medication List - Protect others around you: Learn how to safely use, store and throw away your medicines at www.disposemymeds.org.          This list is accurate as of: 3/29/17  6:56 PM.  Always use your most recent med list.                    Brand Name Dispense Instructions for use    acetaminophen 325 MG tablet    TYLENOL    100 tablet    Take 2 tablets (650 mg) by mouth every 4 hours as needed for mild pain       ALPRAZolam 0.5 MG tablet    XANAX    10 tablet    Take 1 tablet (0.5 mg) by mouth as needed for anxiety (for anxiety with blood draws)       FEXOFENADINE HCL PO      Take 180 mg by mouth       Fiber (Guar Gum) Chew      Take 2.5 g by mouth 2 times daily       Multi-vitamin Tabs tablet   Generic drug:  multivitamin, therapeutic with minerals      1 DAILY       order for DME     1 Device    Equipment being ordered: orthotics       senna-docusate 8.6-50 MG per tablet    SENOKOT-S;PERICOLACE    60 tablet    Take 1 tablet by mouth 2 times daily as needed for constipation          Need for prophylactic measure

## 2019-11-06 ENCOUNTER — ALLIED HEALTH/NURSE VISIT (OUTPATIENT)
Dept: NURSING | Facility: CLINIC | Age: 24
End: 2019-11-06
Payer: COMMERCIAL

## 2019-11-06 DIAGNOSIS — Z23 NEED FOR PROPHYLACTIC VACCINATION AND INOCULATION AGAINST INFLUENZA: Primary | ICD-10-CM

## 2019-11-06 PROCEDURE — 90471 IMMUNIZATION ADMIN: CPT

## 2019-11-06 PROCEDURE — 90686 IIV4 VACC NO PRSV 0.5 ML IM: CPT

## 2020-02-24 ENCOUNTER — TELEPHONE (OUTPATIENT)
Dept: FAMILY MEDICINE | Facility: CLINIC | Age: 25
End: 2020-02-24

## 2020-02-24 NOTE — TELEPHONE ENCOUNTER
Received forms from [facility]. Pt last saw Alice Mayers PA-C for a physical. Forms placed in providers basket. Debib Hare TC

## 2020-02-28 NOTE — TELEPHONE ENCOUNTER
Please try calling again, now that I have looked at form I definitely need to see her before I can fill it out. We can fill it out in office.     Alice Mayers PA-C

## 2020-02-28 NOTE — TELEPHONE ENCOUNTER
Faxed note to [facility] @ [phone], than an appointment is needed to fill out the form.Michela Blair MA/MARITZA

## 2020-06-30 ENCOUNTER — TELEPHONE (OUTPATIENT)
Dept: FAMILY MEDICINE | Facility: CLINIC | Age: 25
End: 2020-06-30

## 2020-06-30 NOTE — TELEPHONE ENCOUNTER
We can certainly do video. telephone would not be acceptable as I havent seen patient in a long time.     Thanksarmand

## 2020-06-30 NOTE — TELEPHONE ENCOUNTER
To [facility] to advise.  Are you ok with doing a virtual visit for a physical with this patient.  Per group home is nonverbal and blind.  Will not keep mask on and feels is too vulnerable to be seen in clinic.

## 2020-06-30 NOTE — TELEPHONE ENCOUNTER
Called the group home @ [phone]. Spoke to an employee. I gave her the message that TERRY Mayers is ok with doing a Video visit. The supervisor of the group home schedules the appointments. She was not in. Given Michela SALAS's direct line to call back to schedule a Video visit appt. # [phone].   MARITZA Zamudio

## 2020-07-16 ENCOUNTER — VIRTUAL VISIT (OUTPATIENT)
Dept: FAMILY MEDICINE | Facility: CLINIC | Age: 25
End: 2020-07-16
Payer: COMMERCIAL

## 2020-07-16 DIAGNOSIS — Z00.00 ROUTINE GENERAL MEDICAL EXAMINATION AT A HEALTH CARE FACILITY: Primary | ICD-10-CM

## 2020-07-16 DIAGNOSIS — R53.83 OTHER FATIGUE: ICD-10-CM

## 2020-07-16 DIAGNOSIS — R62.50 DEVELOPMENT DELAY: ICD-10-CM

## 2020-07-16 PROCEDURE — 99213 OFFICE O/P EST LOW 20 MIN: CPT | Mod: GT | Performed by: PHYSICIAN ASSISTANT

## 2020-07-16 RX ORDER — LORAZEPAM 0.5 MG/1
TABLET ORAL
Qty: 10 TABLET | Refills: 0 | Status: SHIPPED | OUTPATIENT
Start: 2020-07-16

## 2020-07-16 NOTE — PROGRESS NOTES
"Pavithra Wing is a 25 year old female who is being evaluated via a billable video visit.      The patient has been notified of following:     \"This video visit will be conducted via a call between you and your physician/provider. We have found that certain health care needs can be provided without the need for an in-person physical exam.  This service lets us provide the care you need with a video conversation.  If a prescription is necessary we can send it directly to your pharmacy.  If lab work is needed we can place an order for that and you can then stop by our lab to have the test done at a later time.    Video visits are billed at different rates depending on your insurance coverage.  Please reach out to your insurance provider with any questions.    If during the course of the call the physician/provider feels a video visit is not appropriate, you will not be charged for this service.\"    Patient has given verbal consent for Video visit? Yes  How would you like to obtain your AVS? Mail a copy  If you are dropped from the video visit, the video invite should be resent to: Text to cell phone: [phone]  Adelina  Will anyone else be joining your video visit? No    Subjective     Pavithra Wing is a 25 year old female who presents today via video visit for the following health issues:    HPI      Patient lives in group home. Tori is doing visit today, a caregiver.   Patient is blind, has developmental delay (severe), impaired hearing. Uses sign language to communicate minimally.     Past two weeks has been lying in bed more. Can't go to day program due to covid which bums her out. No fevers. No known urinary issues.       Annual Wellness Visit    Are you in the first 12 months of your Medicare Part B coverage?  No    Physical Health:    In general, how would you rate your overall physical health? good    Outside of work, how many days during the week do you exercise? If they can get " "her outside    Outside of work, approximately how many minutes a day do you exercise?15-30 minutes    If you drink alcohol do you typically have >3 drinks per day or >7 drinks per week? Not Applicable    Do you usually eat at least 4 servings of fruit and vegetables a day, include whole grains & fiber and avoid regularly eating high fat or \"junk\" foods? Yes    Do you have any problems taking medications regularly? No    Do you have any side effects from medications? none    Needs assistance for the following daily activities: needs assistance with everything    Which of the following safety concerns are present in your home?  none identified     Hearing impairment: No    In the past 6 months, have you been bothered by leaking of urine? no    Mental Health:    In general, how would you rate your overall mental or emotional health?fair  PHQ-2 Score:      Do you feel safe in your environment? Yes    Have you ever done Advance Care Planning? (For example, a Health Directive, POLST, or a discussion with a medical provider or your loved ones about your wishes)? Did not ask    Fall risk:     yes      Do you have sleep apnea, excessive snoring or daytime drowsiness?: no    Current providers sharing in care for this patient include:   Patient Care Team:  Alice Mayers PA-C as PCP - General (Physician Assistant - Medical)  Taurus Myrick MD as MD (Internal Medicine)  Rosy Ryan MD as MD (OB/Gyn)  Alexa Moyer MD as MD (Otolaryngology)  Ngozi Randall APRN CNP as Assigned PCP          Video Start Time: 11:10 AM        Patient Active Problem List   Diagnosis     Development delay: functional level of 2-4yo     Blindness     Impaired hearing     Mastoiditis     Constipation     Lactose intolerance     Health Care Home - tier 2     Post mastoidectomy sequelae     Hyperlipidemia with target LDL less than 160     S/P hysterectomy     Essential thrombocythemia (H)     Acquired von " Willebrand disease (H)     Past Surgical History:   Procedure Laterality Date     ADENOIDECTOMY  4/24/2014    Procedure: ADENOIDECTOMY;  Surgeon: Omar Quiroz MD;  [facility]     C ANESTH,EYE EXAM      Retinopathy of prematurity     DENTAL SURGERY       EXAM UNDER ANESTHESIA EAR(S)  4/27/2012    Procedure:EXAM UNDER ANESTHESIA EAR(S); Surgeon:OMAR QUIROZ; [facility]     EXAM UNDER ANESTHESIA EAR(S)  4/24/2014    Procedure: EXAM UNDER ANESTHESIA EAR(S);  Surgeon: Omar Quiroz MD;  [facility]     EXAM UNDER ANESTHESIA EAR(S) Bilateral 9/2/2015    Procedure: EXAM UNDER ANESTHESIA EAR(S);  Surgeon: Speedy Griffin MD;  [facility]     EXAM UNDER ANESTHESIA EAR(S) Bilateral 8/14/2017    Procedure: EXAM UNDER ANESTHESIA EAR(S);  Bilateral Ear Exam and Cerumenectomy under anesthesia;  Surgeon: Alexa Moyer MD;  [facility]     EXAM UNDER ANESTHESIA EYE(S)  4/24/2014    Procedure: EXAM UNDER ANESTHESIA EYE(S);  Surgeon: Romi Mary MD;  [facility]     EXAM UNDER ANESTHESIA NOSE  4/24/2014    Procedure: EXAM UNDER ANESTHESIA NOSE;  Surgeon: Omar Qiuroz MD;  [facility]     EXAM UNDER ANESTHESIA, CLEAN MASTOID CAVITY Bilateral 8/3/2018    Procedure: EXAM UNDER ANESTHESIA, CLEAN MASTOID CAVITY;  Bilateral Ear Exam and Mastoid Cleaning Under Anesthesia;  Surgeon: Alexa Moyer MD;  [facility]     EXAM UNDER ANESTHESIA, RESTORATIONS, EXTRACTION(S) DENTAL COMPLEX, COMBINED  3/29/2012    Procedure:COMBINED EXAM UNDER ANESTHESIA, RESTORATIONS, EXTRACTION(S) DENTAL COMPLEX; Dental Exam, Radiograph, Dental Restorations, Periodontal Therapy, right ear mold impression.; Surgeon:PRITI VILLEGAS; [facility]     EXAM UNDER ANESTHESIA, RESTORATIONS, EXTRACTION(S) DENTAL COMPLEX, COMBINED N/A 1/10/2018    Procedure: COMBINED EXAM UNDER ANESTHESIA, RESTORATIONS, EXTRACTION(S) DENTAL COMPLEX;  Dental Exam,, Radiographs, Periodontal Therapy, Floride Varnish,  Extraction of Teeth # 16, 17, 32;  Surgeon: Andrea Portillo DDS;  [facility]     EXAM UNDER ANESTHESIA, RESTORATIONS, EXTRACTION(S) DENTAL, COMBINED  4/24/2014    Procedure: COMBINED EXAM UNDER ANESTHESIA, RESTORATIONS, EXTRACTION(S) DENTAL;  Surgeon: Yun Flores DDS;  [facility]     EXAM UNDER ANESTHESIA, RESTORATIONS, EXTRACTION(S) DENTAL, COMBINED N/A 9/2/2015    Procedure: COMBINED EXAM UNDER ANESTHESIA, RESTORATIONS, EXTRACTION(S) DENTAL;  Surgeon: Andrea Portillo DDS;  [facility]     HYSTERECTOMY TOTAL ABDOMINAL       IMPLANT SOPHONO DEVICE  4/27/2012    Procedure:IMPLANT SOPHONO DEVICE; Left Sophono Implant   ; Surgeon:RANDY WARNER; [facility]     LAPAROSCOPIC HYSTERECTOMY TOTAL, SALPINGECTOMY BILATERAL Bilateral 1/20/2017    Procedure: LAPAROSCOPIC HYSTERECTOMY TOTAL, SALPINGECTOMY BILATERAL;  Surgeon: Rosy Ryan MD;  [facility]     ODONTECTOMY N/A 1/10/2018    Procedure: ODONTECTOMY;   Extraction of Teeth # 16, 17, 3;  Surgeon: Cezar Fischer DDS;  [facility]     PE TUBES       PICC INSERTION      removed 2/25/09       Social History     Tobacco Use     Smoking status: Never Smoker     Smokeless tobacco: Never Used   Substance Use Topics     Alcohol use: No     Family History   Problem Relation Age of Onset     Unknown/Adopted Mother      Unknown/Adopted Father      Unknown/Adopted Other         adopted         Current Outpatient Medications   Medication Sig Dispense Refill     acetaminophen (TYLENOL) 325 MG tablet Take 2 tablets (650 mg) by mouth every 4 hours as needed for mild pain 100 tablet 0     CALCIUM 500/D 500-400 MG-UNIT CHEW Twice daily        MG capsule TAKE 1 CAPSULE BY MOUTH ONCE DAILY 31 capsule 11     Fiber, Guar Gum, CHEW Take 2.5 g by mouth 2 times daily       ibuprofen (ADVIL/MOTRIN) 600 MG tablet Take 1 tablet (600 mg) by mouth every 6 hours as needed for pain or fever (mild to moderate pain, or temperature greater than 102  F) 30 tablet 0     loratadine (CLARITIN) 10 MG tablet Take 1 tablet (10 mg) by mouth daily 90 tablet 3     MULTI-VITAMIN OR TABS 1 DAILY       LORazepam (ATIVAN) 0.5 MG tablet Give 1 tab 30-60 minutes prior blood draw or injections.  May repeat once if needed (Patient not taking: Reported on 7/16/2020) 10 tablet 0     Allergies   Allergen Reactions     Benzalkonium Chloride Rash       Reviewed and updated as needed this visit by Provider         Review of Systems   Constitutional, HEENT, cardiovascular, pulmonary, GI, , musculoskeletal, neuro, skin, endocrine and psych systems are negative, except as otherwise noted.      Objective             Physical Exam     GENERAL: alert and no distress  EYES: Eyes grossly normal to inspection.  No discharge or erythema, or obvious scleral/conjunctival abnormalities.  RESP: No audible wheeze, cough, or visible cyanosis.  No visible retractions or increased work of breathing.    SKIN: Visible skin clear. No significant rash, abnormal pigmentation or lesions.  NEURO: Cranial nerves grossly intact.  Mentation and speech appropriate for age.  Psych: Is lying in bed with elbow covering face. Per caregiver she has been doing this until the evening for 2 weeks.      ua-pending collection    Assessment & Plan     1. Routine general medical examination at a health care facility      2. Other fatigue  Difficult as patient is not communicating  We asked if she was sad, she did not respond which is not unusual per caregiver  Could be UTI possibly so will get UA. They will bring in sample, discussed has to be properly collected  Could be depression/adjustment to not being able to go out to daycenters/change in routine due to covid. We discussed starting serotonin specific reuptake inhibitor or referral to psych for further eval, they want to think about and see what UA shows first.   Has h/o very high platelets. Saw hematology. Has a hard time with blood draws. Uses ativan with only helps  a little per caregiver. Theirs is . I wanted labs but they are hesitant to do this.   We will have to f/u via telephone after labs back    - UA with Microscopic reflex to Culture; Future    3. Development delay: functional level of 2-4yo             No follow-ups on file.    Alice Mayers PA-C  [facility]      Video-Visit Details    Type of service:  Video Visit    Video End Time:11:37 AM    Originating Location (pt. Location): Long term Care    Distant Location (provider location):  [facility]     Platform used for Video Visit: Doximity    No follow-ups on file.       Alice Mayers PA-C

## 2020-07-21 DIAGNOSIS — R53.83 FATIGUE, UNSPECIFIED TYPE: Primary | ICD-10-CM

## 2020-07-21 DIAGNOSIS — R53.83 FATIGUE: Primary | ICD-10-CM

## 2020-07-21 DIAGNOSIS — R53.83 OTHER FATIGUE: ICD-10-CM

## 2020-07-21 LAB
ALBUMIN UR-MCNC: NEGATIVE MG/DL
AMORPH CRY #/AREA URNS HPF: ABNORMAL /HPF
APPEARANCE UR: ABNORMAL
BILIRUB UR QL STRIP: NEGATIVE
COLOR UR AUTO: YELLOW
GLUCOSE UR STRIP-MCNC: NEGATIVE MG/DL
HGB UR QL STRIP: NEGATIVE
KETONES UR STRIP-MCNC: ABNORMAL MG/DL
LEUKOCYTE ESTERASE UR QL STRIP: NEGATIVE
MUCOUS THREADS #/AREA URNS LPF: PRESENT /LPF
NITRATE UR QL: NEGATIVE
NON-SQ EPI CELLS #/AREA URNS LPF: ABNORMAL /LPF
PH UR STRIP: 5.5 PH (ref 5–7)
RBC #/AREA URNS AUTO: ABNORMAL /HPF
SOURCE: ABNORMAL
SP GR UR STRIP: 1.01 (ref 1–1.03)
UROBILINOGEN UR STRIP-ACNC: 0.2 EU/DL (ref 0.2–1)
WBC #/AREA URNS AUTO: ABNORMAL /HPF

## 2020-07-21 PROCEDURE — 87086 URINE CULTURE/COLONY COUNT: CPT | Performed by: PHYSICIAN ASSISTANT

## 2020-07-21 PROCEDURE — 81001 URINALYSIS AUTO W/SCOPE: CPT | Performed by: PHYSICIAN ASSISTANT

## 2020-07-21 RX ORDER — SERTRALINE HYDROCHLORIDE 25 MG/1
25 TABLET, FILM COATED ORAL DAILY
Qty: 30 TABLET | Refills: 0 | Status: SHIPPED | OUTPATIENT
Start: 2020-07-21 | End: 2020-08-14

## 2020-07-21 NOTE — RESULT ENCOUNTER NOTE
PLEASE CALL PATIENT:Dear Pavithra,      It was a pleasure to see you at your recent office visit.  Your test results are listed below. Normal urine. I have sent over 25 mg zoloft to try for her behavioral concerns. Lets follow up with another video visit 2 weeks after that please. If her mood worsens before then, then follow up sooner.         If you have any questions or concerns, please call the clinic at [phone].    Sincerely,  Alice Mayers PA-C

## 2020-07-21 NOTE — LETTER
July 27, 2020      Pavithra Wing  [address] [address]        Dear Pavithra,       It was a pleasure to see you at your recent office visit.  Your test results are listed below.  No infection in urine. Follow up as previously directed.       If you have any questions or concerns, please call the clinic at [phone].     Sincerely,   Alice Mayers PA-C     Resulted Orders   UA with Microscopic reflex to Culture   Result Value Ref Range    Color Urine Yellow     Appearance Urine Slightly Cloudy     Glucose Urine Negative NEG^Negative mg/dL    Bilirubin Urine Negative NEG^Negative    Ketones Urine Trace (A) NEG^Negative mg/dL    Specific Gravity Urine 1.015 1.003 - 1.035    pH Urine 5.5 5.0 - 7.0 pH    Protein Albumin Urine Negative NEG^Negative mg/dL    Urobilinogen Urine 0.2 0.2 - 1.0 EU/dL    Nitrite Urine Negative NEG^Negative    Blood Urine Negative NEG^Negative    Leukocyte Esterase Urine Negative NEG^Negative    Source Midstream Urine     WBC Urine 0 - 5 OTO5^0 - 5 /HPF    RBC Urine O - 2 OTO2^O - 2 /HPF    Squamous Epithelial /LPF Urine Moderate (A) FEW^Few /LPF    Amorphous Crystals Few (A) NEG^Negative /HPF    Mucous Urine Present (A) NEG^Negative /LPF   Urine Culture Aerobic Bacterial   Result Value Ref Range    Specimen Description Midstream Urine     Special Requests Specimen received in preservative     Culture Micro No growth

## 2020-07-22 ENCOUNTER — TELEPHONE (OUTPATIENT)
Dept: FAMILY MEDICINE | Facility: CLINIC | Age: 25
End: 2020-07-22

## 2020-07-22 NOTE — TELEPHONE ENCOUNTER
Reason for Call:  Other call back    Detailed comments: group home is calling for results from lab drop off 07/21/20, please call to discuss. Thank you.    Phone Number Patient can be reached at: [phone]    Best Time:     Can we leave a detailed message on this number? NO    Call taken on 7/22/2020 at 11:07 AM by Diana Hatfield

## 2020-07-22 NOTE — TELEPHONE ENCOUNTER
Pat from [facility] notified of provider message as written.  She would like a signed copy faxed to [facility] at fax [phone].  Copy made, signed and faxed.  Jennifer MENONN, RN

## 2020-07-22 NOTE — TELEPHONE ENCOUNTER
Left message for staff at group home to call back and clarify who should be receiving message about pt.  Jennifer MENONN, RN

## 2020-07-22 NOTE — TELEPHONE ENCOUNTER
PLEASE CALL PATIENT:Dear Pavithra,       It was a pleasure to see you at your recent office visit.  Your test results are listed below. Normal urine. I have sent over 25 mg zoloft to try for her behavioral concerns. Lets follow up with another video visit 2 weeks after that please. If her mood worsens before then, then follow up sooner.         If you have any questions or concerns, please call the clinic at [phone].     Sincerely,   Alice Mayers PA-C

## 2020-07-23 LAB
BACTERIA SPEC CULT: NO GROWTH
Lab: NORMAL
SPECIMEN SOURCE: NORMAL

## 2020-07-27 NOTE — RESULT ENCOUNTER NOTE
Dear Pavithra,      It was a pleasure to see you at your recent office visit.  Your test results are listed below.  No infection in urine. Follow up as previously directed.       If you have any questions or concerns, please call the clinic at [phone].    Sincerely,  Alice Mayers PA-C

## 2020-08-14 DIAGNOSIS — R53.83 FATIGUE, UNSPECIFIED TYPE: ICD-10-CM

## 2020-08-14 RX ORDER — SERTRALINE HYDROCHLORIDE 25 MG/1
TABLET, FILM COATED ORAL
Qty: 31 TABLET | Refills: 11 | Status: SHIPPED | OUTPATIENT
Start: 2020-08-14

## 2020-08-14 NOTE — TELEPHONE ENCOUNTER
Patient/ caregiver at group home did virtual visit for possible depression in July.  Routing to provider to advise.  Caitlin Burns BSN, RN

## 2024-01-24 ENCOUNTER — PATIENT OUTREACH (OUTPATIENT)
Dept: ONCOLOGY | Facility: CLINIC | Age: 29
End: 2024-01-24
Payer: COMMERCIAL

## 2024-01-24 NOTE — LETTER
January 24, 2024      TO: Pavithra Wing  [address] [address]         To whom it may concern:    Pavithra Wing is a former patient of ours, last seen in clinic in 2019.  She has Essential Thrombocythemia (ET).  According to Dr Myrick, her hematologist, her ET mutation is mild and pretty benign.  She does not need routine follow-up at our clinic and just observation is acceptable. A yearly blood draw by her PCP to check her platelets would be ideal according to Dr Myrick, but not necessary if it will cause trauma to the patient and she is not showing signs of bleeding.  What the group home should look for is new bleeding  (ie nose bleeds, bleeding gums or blood in the stool) and if that occurs, her primary MD should be notified to determine next steps.        Sincerely,      Chely Chaparro RNCC for Dr Taurus Myrick MD

## 2024-01-24 NOTE — PROGRESS NOTES
[facility]: Cancer Care                                                                                          Pt lives in a group home and has ET.  After speaking with Dr Myrick, she has a more benign/mild mutation that can be managed with observation alone.  Ideally she would get labs drawn once yearly, but if that is not possible, it would be ok for just observation alone.  The group home should monitor for bleeding (ie nose bleeds, bleeding gum, blood in stool) and the pt should be seen if that occurs.  She does not need to be seen in our clinic for regular follow up.  Letter to be written to [facility] stating that routine follow-up is not required.  Letter to be sent in mail.  Writer spoke with Lanie Atwood at the group home and let her know Dr Myrick's recommendations.      Signature:  Chely Chaparro RN

## (undated) DEVICE — RX BACITRACIN OINTMENT 0.9G 1/32OZ 01680 11109

## (undated) DEVICE — SOL NACL 0.9% IRRIG 1000ML BOTTLE 2F7124

## (undated) DEVICE — SOL WATER IRRIG 1000ML BOTTLE 2F7114

## (undated) DEVICE — STRAP KNEE/BODY 31143004

## (undated) DEVICE — LINEN GOWN OVERSIZE 5408

## (undated) DEVICE — Device

## (undated) DEVICE — GOWN IMPERVIOUS SPECIALTY XLG/XLONG 32474

## (undated) DEVICE — SOL NACL 0.9% INJ 1000ML BAG 2B1324X

## (undated) DEVICE — PACK PEDS MYRINGOTOMY CUSTOM SEN15PMRM2

## (undated) DEVICE — ANTIFOG SOLUTION W/FOAM PAD 31142527

## (undated) DEVICE — GLOVE RADIATION RESISTANT SZ 7  95-394

## (undated) DEVICE — BLADE KNIFE BEAVER MYRINGOTOMY 377100

## (undated) DEVICE — TOOTHBRUSH ADULT NON STERILE MDS136850

## (undated) DEVICE — TUBING SUCTION 12"X1/4" N612

## (undated) DEVICE — GLOVE PROTEXIS W/NEU-THERA 8.5  2D73TE85

## (undated) DEVICE — PAD ARMBOARD FOAM EGGCRATE COVIDEN 3114367

## (undated) DEVICE — GLOVE PROTEXIS POWDER FREE SMT 6.5  2D72PT65X

## (undated) DEVICE — COVER PROBE ULTRASOUND 3D W/GEL 5X96" LF 20-P3D596

## (undated) DEVICE — BRUSH SURGICAL SCRUB PLAIN STERILE 4454A

## (undated) DEVICE — DRAPE THREE QUARTER SHEET 29350

## (undated) DEVICE — STRAP UNIVERSAL POSITIONING 2-PIECE 4X47X76" 91-287

## (undated) DEVICE — LINEN TOWEL PACK X5 5464

## (undated) DEVICE — LINEN GOWN X4 5410

## (undated) DEVICE — GLOVE PROTEXIS POWDER FREE 8.5 ORTHOPEDIC 2D73ET85

## (undated) DEVICE — DRSG COTTON BALL 6PK LCB62

## (undated) DEVICE — SUCTION CANISTER MEDIVAC LINER 1500ML W/LID 65651-515

## (undated) DEVICE — ESU GROUND PAD UNIVERSAL W/O CORD

## (undated) DEVICE — PREP POVIDONE IODINE SOLUTION 10% 120ML

## (undated) DEVICE — LIGHT HANDLE X2

## (undated) DEVICE — SYR EAR BULB 3OZ 0035830

## (undated) DEVICE — SUCTION TIP YANKAUER W/O VENT K86

## (undated) DEVICE — PREP POVIDONE IODINE 10% SWABSTICKS TRIPLE PACK AS-PVPSBPT

## (undated) RX ORDER — BUPIVACAINE HYDROCHLORIDE 2.5 MG/ML
INJECTION, SOLUTION INFILTRATION; PERINEURAL
Status: DISPENSED
Start: 2018-01-10

## (undated) RX ORDER — ONDANSETRON 2 MG/ML
INJECTION INTRAMUSCULAR; INTRAVENOUS
Status: DISPENSED
Start: 2018-01-10

## (undated) RX ORDER — ONDANSETRON 2 MG/ML
INJECTION INTRAMUSCULAR; INTRAVENOUS
Status: DISPENSED
Start: 2018-08-03

## (undated) RX ORDER — FENTANYL CITRATE 50 UG/ML
INJECTION, SOLUTION INTRAMUSCULAR; INTRAVENOUS
Status: DISPENSED
Start: 2018-01-10

## (undated) RX ORDER — ACETAMINOPHEN 325 MG/1
TABLET ORAL
Status: DISPENSED
Start: 2017-08-14

## (undated) RX ORDER — OFLOXACIN 3 MG/ML
SOLUTION AURICULAR (OTIC)
Status: DISPENSED
Start: 2018-08-03

## (undated) RX ORDER — DEXAMETHASONE SODIUM PHOSPHATE 4 MG/ML
INJECTION, SOLUTION INTRA-ARTICULAR; INTRALESIONAL; INTRAMUSCULAR; INTRAVENOUS; SOFT TISSUE
Status: DISPENSED
Start: 2018-01-10

## (undated) RX ORDER — HYDROCODONE BITARTRATE AND ACETAMINOPHEN 5; 325 MG/1; MG/1
TABLET ORAL
Status: DISPENSED
Start: 2018-01-10

## (undated) RX ORDER — KETOROLAC TROMETHAMINE 30 MG/ML
INJECTION, SOLUTION INTRAMUSCULAR; INTRAVENOUS
Status: DISPENSED
Start: 2018-08-03

## (undated) RX ORDER — CHLORHEXIDINE GLUCONATE ORAL RINSE 1.2 MG/ML
SOLUTION DENTAL
Status: DISPENSED
Start: 2018-01-10

## (undated) RX ORDER — FENTANYL CITRATE 50 UG/ML
INJECTION, SOLUTION INTRAMUSCULAR; INTRAVENOUS
Status: DISPENSED
Start: 2018-08-03

## (undated) RX ORDER — CEFAZOLIN SODIUM 2 G/100ML
INJECTION, SOLUTION INTRAVENOUS
Status: DISPENSED
Start: 2018-01-10

## (undated) RX ORDER — MIDAZOLAM HYDROCHLORIDE 2 MG/ML
SYRUP ORAL
Status: DISPENSED
Start: 2018-01-10

## (undated) RX ORDER — GABAPENTIN 300 MG/1
CAPSULE ORAL
Status: DISPENSED
Start: 2017-08-14

## (undated) RX ORDER — KETOROLAC TROMETHAMINE 30 MG/ML
INJECTION, SOLUTION INTRAMUSCULAR; INTRAVENOUS
Status: DISPENSED
Start: 2018-01-10